# Patient Record
Sex: MALE | Race: OTHER | HISPANIC OR LATINO | URBAN - METROPOLITAN AREA
[De-identification: names, ages, dates, MRNs, and addresses within clinical notes are randomized per-mention and may not be internally consistent; named-entity substitution may affect disease eponyms.]

---

## 2017-07-26 ENCOUNTER — OUTPATIENT (OUTPATIENT)
Dept: OUTPATIENT SERVICES | Facility: HOSPITAL | Age: 72
LOS: 1 days | End: 2017-07-26
Payer: COMMERCIAL

## 2017-07-26 PROCEDURE — 73221 MRI JOINT UPR EXTREM W/O DYE: CPT

## 2017-07-26 PROCEDURE — 73221 MRI JOINT UPR EXTREM W/O DYE: CPT | Mod: 26,76,RT

## 2018-02-26 ENCOUNTER — FORM ENCOUNTER (OUTPATIENT)
Age: 73
End: 2018-02-26

## 2018-02-27 ENCOUNTER — APPOINTMENT (OUTPATIENT)
Dept: ORTHOPEDIC SURGERY | Facility: CLINIC | Age: 73
End: 2018-02-27
Payer: COMMERCIAL

## 2018-02-27 ENCOUNTER — OUTPATIENT (OUTPATIENT)
Dept: OUTPATIENT SERVICES | Facility: HOSPITAL | Age: 73
LOS: 1 days | End: 2018-02-27
Payer: COMMERCIAL

## 2018-02-27 DIAGNOSIS — D16.9 BENIGN NEOPLASM OF BONE AND ARTICULAR CARTILAGE, UNSPECIFIED: ICD-10-CM

## 2018-02-27 PROBLEM — Z00.00 ENCOUNTER FOR PREVENTIVE HEALTH EXAMINATION: Status: ACTIVE | Noted: 2018-02-27

## 2018-02-27 PROCEDURE — 73030 X-RAY EXAM OF SHOULDER: CPT

## 2018-02-27 PROCEDURE — 20610 DRAIN/INJ JOINT/BURSA W/O US: CPT | Mod: LT

## 2018-02-27 PROCEDURE — 99203 OFFICE O/P NEW LOW 30 MIN: CPT | Mod: 25

## 2018-02-27 PROCEDURE — 73030 X-RAY EXAM OF SHOULDER: CPT | Mod: 26,LT

## 2018-02-27 RX ORDER — IBUPROFEN 800 MG/1
800 TABLET, FILM COATED ORAL 3 TIMES DAILY
Qty: 63 | Refills: 1 | Status: ACTIVE | COMMUNITY
Start: 2018-02-27 | End: 1900-01-01

## 2018-03-09 ENCOUNTER — APPOINTMENT (OUTPATIENT)
Dept: ORTHOPEDIC SURGERY | Facility: CLINIC | Age: 73
End: 2018-03-09
Payer: COMMERCIAL

## 2018-03-09 DIAGNOSIS — M75.82 OTHER SHOULDER LESIONS, LEFT SHOULDER: ICD-10-CM

## 2018-03-09 PROCEDURE — 99213 OFFICE O/P EST LOW 20 MIN: CPT

## 2018-03-09 RX ORDER — IBUPROFEN 800 MG/1
800 TABLET, FILM COATED ORAL
Qty: 90 | Refills: 0 | Status: ACTIVE | COMMUNITY
Start: 2018-02-09

## 2018-03-09 RX ORDER — IBUPROFEN 600 MG/1
600 TABLET, FILM COATED ORAL
Qty: 30 | Refills: 0 | Status: ACTIVE | COMMUNITY
Start: 2017-08-27

## 2018-03-09 RX ORDER — CYCLOBENZAPRINE HYDROCHLORIDE 10 MG/1
10 TABLET, FILM COATED ORAL
Qty: 4 | Refills: 0 | Status: ACTIVE | COMMUNITY
Start: 2017-09-27

## 2018-03-09 RX ORDER — NAPROXEN 500 MG/1
500 TABLET, DELAYED RELEASE ORAL
Qty: 28 | Refills: 0 | Status: ACTIVE | COMMUNITY
Start: 2017-09-27

## 2018-03-09 RX ORDER — AMLODIPINE BESYLATE AND BENAZEPRIL HYDROCHLORIDE 10; 20 MG/1; MG/1
10-20 CAPSULE ORAL
Qty: 30 | Refills: 0 | Status: ACTIVE | COMMUNITY
Start: 2017-05-03

## 2018-03-09 RX ORDER — BICALUTAMIDE 50 MG/1
50 TABLET ORAL
Qty: 90 | Refills: 0 | Status: ACTIVE | COMMUNITY
Start: 2017-11-10

## 2018-03-09 RX ORDER — TRAMADOL HYDROCHLORIDE 50 MG/1
50 TABLET, COATED ORAL
Qty: 10 | Refills: 0 | Status: ACTIVE | COMMUNITY
Start: 2018-02-15

## 2018-03-09 RX ORDER — ACETAMINOPHEN AND CODEINE 300; 30 MG/1; MG/1
300-30 TABLET ORAL
Qty: 12 | Refills: 0 | Status: ACTIVE | COMMUNITY
Start: 2018-01-10

## 2018-03-09 RX ORDER — NAPROXEN SODIUM 375 MG/1
375 TABLET, FILM COATED, EXTENDED RELEASE ORAL
Qty: 20 | Refills: 0 | Status: ACTIVE | COMMUNITY
Start: 2017-12-10

## 2018-03-09 RX ORDER — GABAPENTIN 100 MG/1
100 CAPSULE ORAL
Qty: 90 | Refills: 0 | Status: ACTIVE | COMMUNITY
Start: 2017-11-12

## 2018-03-09 RX ORDER — LEUPROLIDE ACETATE 22.5 MG
22.5 KIT INTRAMUSCULAR
Qty: 1 | Refills: 0 | Status: ACTIVE | COMMUNITY
Start: 2016-12-16

## 2018-03-09 RX ORDER — TAMSULOSIN HYDROCHLORIDE 0.4 MG/1
0.4 CAPSULE ORAL
Qty: 30 | Refills: 0 | Status: ACTIVE | COMMUNITY
Start: 2017-01-09

## 2018-10-12 ENCOUNTER — APPOINTMENT (OUTPATIENT)
Dept: PULMONOLOGY | Facility: CLINIC | Age: 73
End: 2018-10-12

## 2018-11-02 ENCOUNTER — APPOINTMENT (OUTPATIENT)
Dept: PULMONOLOGY | Facility: CLINIC | Age: 73
End: 2018-11-02
Payer: COMMERCIAL

## 2018-11-02 VITALS
DIASTOLIC BLOOD PRESSURE: 96 MMHG | OXYGEN SATURATION: 97 % | BODY MASS INDEX: 23.07 KG/M2 | TEMPERATURE: 98.2 F | SYSTOLIC BLOOD PRESSURE: 162 MMHG | WEIGHT: 147 LBS | HEIGHT: 67 IN | HEART RATE: 73 BPM

## 2018-11-02 DIAGNOSIS — Z78.9 OTHER SPECIFIED HEALTH STATUS: ICD-10-CM

## 2018-11-02 DIAGNOSIS — R91.1 SOLITARY PULMONARY NODULE: ICD-10-CM

## 2018-11-02 DIAGNOSIS — I10 ESSENTIAL (PRIMARY) HYPERTENSION: ICD-10-CM

## 2018-11-02 PROCEDURE — 94010 BREATHING CAPACITY TEST: CPT

## 2018-11-02 PROCEDURE — 71046 X-RAY EXAM CHEST 2 VIEWS: CPT

## 2018-11-02 PROCEDURE — 99203 OFFICE O/P NEW LOW 30 MIN: CPT | Mod: 25

## 2018-11-23 ENCOUNTER — APPOINTMENT (OUTPATIENT)
Dept: NEUROLOGY | Facility: CLINIC | Age: 73
End: 2018-11-23
Payer: COMMERCIAL

## 2018-11-23 ENCOUNTER — APPOINTMENT (OUTPATIENT)
Dept: CT IMAGING | Facility: HOSPITAL | Age: 73
End: 2018-11-23

## 2018-11-23 VITALS
HEART RATE: 79 BPM | WEIGHT: 147 LBS | HEIGHT: 67 IN | BODY MASS INDEX: 23.07 KG/M2 | SYSTOLIC BLOOD PRESSURE: 143 MMHG | OXYGEN SATURATION: 97 % | DIASTOLIC BLOOD PRESSURE: 94 MMHG

## 2018-11-23 DIAGNOSIS — M54.12 RADICULOPATHY, CERVICAL REGION: ICD-10-CM

## 2018-11-23 DIAGNOSIS — M75.121 COMPLETE ROTATOR CUFF TEAR OR RUPTURE OF RIGHT SHOULDER, NOT SPECIFIED AS TRAUMATIC: ICD-10-CM

## 2018-11-23 DIAGNOSIS — M19.019 PRIMARY OSTEOARTHRITIS, UNSPECIFIED SHOULDER: ICD-10-CM

## 2018-11-23 PROCEDURE — 99204 OFFICE O/P NEW MOD 45 MIN: CPT

## 2019-01-30 ENCOUNTER — APPOINTMENT (OUTPATIENT)
Dept: NEUROLOGY | Facility: CLINIC | Age: 74
End: 2019-01-30

## 2019-05-17 ENCOUNTER — APPOINTMENT (OUTPATIENT)
Dept: NEUROLOGY | Facility: CLINIC | Age: 74
End: 2019-05-17

## 2019-10-04 ENCOUNTER — EMERGENCY (EMERGENCY)
Facility: HOSPITAL | Age: 74
LOS: 1 days | Discharge: HOME | End: 2019-10-04
Attending: EMERGENCY MEDICINE | Admitting: EMERGENCY MEDICINE
Payer: COMMERCIAL

## 2019-10-04 VITALS
OXYGEN SATURATION: 100 % | RESPIRATION RATE: 17 BRPM | TEMPERATURE: 98 F | DIASTOLIC BLOOD PRESSURE: 100 MMHG | SYSTOLIC BLOOD PRESSURE: 165 MMHG | HEART RATE: 55 BPM

## 2019-10-04 VITALS
TEMPERATURE: 99 F | DIASTOLIC BLOOD PRESSURE: 97 MMHG | OXYGEN SATURATION: 98 % | HEART RATE: 50 BPM | RESPIRATION RATE: 18 BRPM | SYSTOLIC BLOOD PRESSURE: 155 MMHG

## 2019-10-04 DIAGNOSIS — Z96.659 PRESENCE OF UNSPECIFIED ARTIFICIAL KNEE JOINT: Chronic | ICD-10-CM

## 2019-10-04 LAB
ALBUMIN SERPL ELPH-MCNC: 4.4 G/DL — SIGNIFICANT CHANGE UP (ref 3.3–5)
ALP SERPL-CCNC: 105 U/L — SIGNIFICANT CHANGE UP (ref 40–120)
ALT FLD-CCNC: 12 U/L — SIGNIFICANT CHANGE UP (ref 10–45)
ANION GAP SERPL CALC-SCNC: 9 MMOL/L — SIGNIFICANT CHANGE UP (ref 5–17)
APPEARANCE UR: CLEAR — SIGNIFICANT CHANGE UP
APTT BLD: 32.2 SEC — SIGNIFICANT CHANGE UP (ref 27.5–36.3)
AST SERPL-CCNC: 14 U/L — SIGNIFICANT CHANGE UP (ref 10–40)
BASOPHILS # BLD AUTO: 0.02 K/UL — SIGNIFICANT CHANGE UP (ref 0–0.2)
BASOPHILS NFR BLD AUTO: 0.6 % — SIGNIFICANT CHANGE UP (ref 0–2)
BILIRUB SERPL-MCNC: 0.3 MG/DL — SIGNIFICANT CHANGE UP (ref 0.2–1.2)
BILIRUB UR-MCNC: NEGATIVE — SIGNIFICANT CHANGE UP
BUN SERPL-MCNC: 8 MG/DL — SIGNIFICANT CHANGE UP (ref 7–23)
CALCIUM SERPL-MCNC: 9.6 MG/DL — SIGNIFICANT CHANGE UP (ref 8.4–10.5)
CHLORIDE SERPL-SCNC: 104 MMOL/L — SIGNIFICANT CHANGE UP (ref 96–108)
CO2 SERPL-SCNC: 27 MMOL/L — SIGNIFICANT CHANGE UP (ref 22–31)
COLOR SPEC: YELLOW — SIGNIFICANT CHANGE UP
CREAT SERPL-MCNC: 0.94 MG/DL — SIGNIFICANT CHANGE UP (ref 0.5–1.3)
DIFF PNL FLD: NEGATIVE — SIGNIFICANT CHANGE UP
EOSINOPHIL # BLD AUTO: 0.2 K/UL — SIGNIFICANT CHANGE UP (ref 0–0.5)
EOSINOPHIL NFR BLD AUTO: 5.7 % — SIGNIFICANT CHANGE UP (ref 0–6)
GLUCOSE SERPL-MCNC: 87 MG/DL — SIGNIFICANT CHANGE UP (ref 70–99)
GLUCOSE UR QL: NEGATIVE — SIGNIFICANT CHANGE UP
HCT VFR BLD CALC: 47.2 % — SIGNIFICANT CHANGE UP (ref 39–50)
HGB BLD-MCNC: 15 G/DL — SIGNIFICANT CHANGE UP (ref 13–17)
IMM GRANULOCYTES NFR BLD AUTO: 0.3 % — SIGNIFICANT CHANGE UP (ref 0–1.5)
INR BLD: 1.04 — SIGNIFICANT CHANGE UP (ref 0.88–1.16)
KETONES UR-MCNC: NEGATIVE — SIGNIFICANT CHANGE UP
LEUKOCYTE ESTERASE UR-ACNC: NEGATIVE — SIGNIFICANT CHANGE UP
LIDOCAIN IGE QN: 13 U/L — SIGNIFICANT CHANGE UP (ref 7–60)
LYMPHOCYTES # BLD AUTO: 1.29 K/UL — SIGNIFICANT CHANGE UP (ref 1–3.3)
LYMPHOCYTES # BLD AUTO: 36.8 % — SIGNIFICANT CHANGE UP (ref 13–44)
MCHC RBC-ENTMCNC: 31.8 GM/DL — LOW (ref 32–36)
MCHC RBC-ENTMCNC: 32 PG — SIGNIFICANT CHANGE UP (ref 27–34)
MCV RBC AUTO: 100.6 FL — HIGH (ref 80–100)
MONOCYTES # BLD AUTO: 0.32 K/UL — SIGNIFICANT CHANGE UP (ref 0–0.9)
MONOCYTES NFR BLD AUTO: 9.1 % — SIGNIFICANT CHANGE UP (ref 2–14)
NEUTROPHILS # BLD AUTO: 1.67 K/UL — LOW (ref 1.8–7.4)
NEUTROPHILS NFR BLD AUTO: 47.5 % — SIGNIFICANT CHANGE UP (ref 43–77)
NITRITE UR-MCNC: NEGATIVE — SIGNIFICANT CHANGE UP
NRBC # BLD: 0 /100 WBCS — SIGNIFICANT CHANGE UP (ref 0–0)
PH UR: 7.5 — SIGNIFICANT CHANGE UP (ref 5–8)
PLATELET # BLD AUTO: 272 K/UL — SIGNIFICANT CHANGE UP (ref 150–400)
POTASSIUM SERPL-MCNC: 4.5 MMOL/L — SIGNIFICANT CHANGE UP (ref 3.5–5.3)
POTASSIUM SERPL-SCNC: 4.5 MMOL/L — SIGNIFICANT CHANGE UP (ref 3.5–5.3)
PROT SERPL-MCNC: 7 G/DL — SIGNIFICANT CHANGE UP (ref 6–8.3)
PROT UR-MCNC: NEGATIVE MG/DL — SIGNIFICANT CHANGE UP
PROTHROM AB SERPL-ACNC: 11.8 SEC — SIGNIFICANT CHANGE UP (ref 10–12.9)
RBC # BLD: 4.69 M/UL — SIGNIFICANT CHANGE UP (ref 4.2–5.8)
RBC # FLD: 13.7 % — SIGNIFICANT CHANGE UP (ref 10.3–14.5)
SODIUM SERPL-SCNC: 140 MMOL/L — SIGNIFICANT CHANGE UP (ref 135–145)
SP GR SPEC: 1.01 — SIGNIFICANT CHANGE UP (ref 1–1.03)
TROPONIN T SERPL-MCNC: 0.02 NG/ML — HIGH (ref 0–0.01)
TROPONIN T SERPL-MCNC: <0.01 NG/ML — SIGNIFICANT CHANGE UP (ref 0–0.01)
UROBILINOGEN FLD QL: 0.2 E.U./DL — SIGNIFICANT CHANGE UP
WBC # BLD: 3.51 K/UL — LOW (ref 3.8–10.5)
WBC # FLD AUTO: 3.51 K/UL — LOW (ref 3.8–10.5)

## 2019-10-04 PROCEDURE — 74177 CT ABD & PELVIS W/CONTRAST: CPT

## 2019-10-04 PROCEDURE — 80053 COMPREHEN METABOLIC PANEL: CPT

## 2019-10-04 PROCEDURE — 96374 THER/PROPH/DIAG INJ IV PUSH: CPT | Mod: XU

## 2019-10-04 PROCEDURE — 85025 COMPLETE CBC W/AUTO DIFF WBC: CPT

## 2019-10-04 PROCEDURE — 99285 EMERGENCY DEPT VISIT HI MDM: CPT

## 2019-10-04 PROCEDURE — 74177 CT ABD & PELVIS W/CONTRAST: CPT | Mod: 26

## 2019-10-04 PROCEDURE — 99284 EMERGENCY DEPT VISIT MOD MDM: CPT | Mod: 25

## 2019-10-04 PROCEDURE — 81003 URINALYSIS AUTO W/O SCOPE: CPT

## 2019-10-04 PROCEDURE — 70450 CT HEAD/BRAIN W/O DYE: CPT | Mod: 26

## 2019-10-04 PROCEDURE — 85610 PROTHROMBIN TIME: CPT

## 2019-10-04 PROCEDURE — 36415 COLL VENOUS BLD VENIPUNCTURE: CPT

## 2019-10-04 PROCEDURE — 71046 X-RAY EXAM CHEST 2 VIEWS: CPT

## 2019-10-04 PROCEDURE — 83690 ASSAY OF LIPASE: CPT

## 2019-10-04 PROCEDURE — 84484 ASSAY OF TROPONIN QUANT: CPT

## 2019-10-04 PROCEDURE — 71046 X-RAY EXAM CHEST 2 VIEWS: CPT | Mod: 26

## 2019-10-04 PROCEDURE — 70450 CT HEAD/BRAIN W/O DYE: CPT

## 2019-10-04 PROCEDURE — 85730 THROMBOPLASTIN TIME PARTIAL: CPT

## 2019-10-04 RX ORDER — IOHEXOL 300 MG/ML
30 INJECTION, SOLUTION INTRAVENOUS ONCE
Refills: 0 | Status: COMPLETED | OUTPATIENT
Start: 2019-10-04 | End: 2019-10-04

## 2019-10-04 RX ORDER — MORPHINE SULFATE 50 MG/1
4 CAPSULE, EXTENDED RELEASE ORAL ONCE
Refills: 0 | Status: DISCONTINUED | OUTPATIENT
Start: 2019-10-04 | End: 2019-10-04

## 2019-10-04 RX ADMIN — IOHEXOL 30 MILLILITER(S): 300 INJECTION, SOLUTION INTRAVENOUS at 10:54

## 2019-10-04 RX ADMIN — MORPHINE SULFATE 4 MILLIGRAM(S): 50 CAPSULE, EXTENDED RELEASE ORAL at 10:54

## 2019-10-04 NOTE — ED PROVIDER NOTE - ATTENDING CONTRIBUTION TO CARE
Pt is a 73yo m, h/o prostate ca, who p/w abd pain x 1 mo and cp x 1 wk. Abd pain to b/l upper abd/ flank. No relief w/ oxycodone. No f/c, n/v/d/c, urinary sx's. Also with intermittent dull pain to chest w/ sob when ambulating. No leg pain/ swelling.     VITAL SIGNS: I have reviewed nursing notes and confirm.  CONSTITUTIONAL: Well-developed; well-nourished; in no acute distress.   SKIN:  warm and dry, no acute rash.   HEAD:  normocephalic, atraumatic.  EYES: EOM intact; conjunctiva and sclera clear.  ENT: No nasal discharge; airway clear.   NECK: Supple; non tender.  CARD: S1, S2 normal; no murmurs, gallops, or rubs. Regular rate and rhythm.   RESP:  Clear to auscultation b/l, no wheezes, rales or rhonchi.  ABD: Normal bowel sounds; soft; non-distended; non-tender; no guarding/ rebound.  EXT: Normal ROM. No clubbing, cyanosis or edema. 2+ pulses to b/l ue/le.  NEURO: Alert, oriented, grossly unremarkable  PSYCH: Cooperative, mood and affect appropriate.    EKG non-ischemic, + sinus nitza. Labs reviewed and unremarkable with normal wbc. Trop minimally elev at 0.02. Will obtain ct a/p and continue to monitor. Pt is a 75yo m, h/o prostate ca, who p/w abd pain x 1 mo and cp x 1 wk. Abd pain to b/l upper abd/ flank. No relief w/ oxycodone. No f/c, n/v/d/c, urinary sx's. Also with intermittent dull pain to chest w/ sob when ambulating. No leg pain/ swelling.     VITAL SIGNS: I have reviewed nursing notes and confirm.  CONSTITUTIONAL: Well-developed; well-nourished; in no acute distress.   SKIN:  warm and dry, no acute rash.   HEAD:  normocephalic, atraumatic.  EYES: EOM intact; conjunctiva and sclera clear.  ENT: No nasal discharge; airway clear.   NECK: Supple.  CARD: S1, S2 normal; no murmurs, gallops, or rubs. Regular rate and rhythm.   RESP:  Clear to auscultation b/l, no wheezes, rales or rhonchi.  ABD: Normal bowel sounds; soft; non-distended; non-tender; no guarding/ rebound.  EXT: Normal ROM. No clubbing, cyanosis or edema. 2+ pulses to b/l ue/le.  NEURO: Alert, oriented, grossly unremarkable  PSYCH: Cooperative, mood and affect appropriate.    EKG non-ischemic, + sinus nitza. Labs reviewed and unremarkable with normal wbc. Trop minimally elev at 0.02. Will obtain ct a/p and continue to monitor.   CT a/p neg for acute findings. Repeat trop improved. Pt seen by cards fellow for cp/orr and cleared for dc and outpt f/u with Dr. Esteves for outpt stress test/ echo. ED evaluation and management discussed with the patient and family in detail.  Close PMD/ cards follow up encouraged.  Strict ED return instructions discussed in detail and patient given the opportunity to ask any questions about their discharge diagnosis and instructions. Patient verbalized understanding.

## 2019-10-04 NOTE — ED PROVIDER NOTE - CLINICAL SUMMARY MEDICAL DECISION MAKING FREE TEXT BOX
cp and abd pain. pt well appearing. tender to b/l upper abdomen. pt without cp at this time. ecg no ischemic changes. cxr no acute pathology. ct abd no acute pathology. pt evaluated by heme onc fellow and pt reported dizziness to her and recommended ct head. no acute pathology on ct head. given elevated troponin initially case d/w cardiac fellow. cardiac fellow arranged for pt to be seen on 10/7/19 by Dr Wheeler. will d/c home to f/u with Dr Esteves and Dr Nix. return precautions d/w pt.

## 2019-10-04 NOTE — ED ADULT NURSE NOTE - CHPI ED NUR SYMPTOMS NEG
no diarrhea/no chills/no dysuria/no hematuria/no blood in stool/no abdominal distension/no burning urination/no vomiting/no fever

## 2019-10-04 NOTE — ED PROVIDER NOTE - NSFOLLOWUPINSTRUCTIONS_ED_ALL_ED_FT
Follow up with cardiology on 10/7/19. Follow up with Dr Nix this week.,         Chest Pain    WHAT YOU NEED TO KNOW:    Chest pain can be caused by a range of conditions, from not serious to life-threatening. Chest pain can be a symptom of a digestive problem, such as acid reflux or a stomach ulcer. An anxiety attack or a strong emotion, such as anger, can also cause chest pain. Infection, inflammation, or a fracture in the bones or cartilage in your chest can cause pain or discomfort. Sometimes chest pain or pressure is caused by poor blood flow to your heart (angina). Chest pain may also be caused by life-threatening conditions such as a heart attack or blood clot in your lungs.     DISCHARGE INSTRUCTIONS:    Call 911 if:     You have any of the following signs of a heart attack:   Squeezing, pressure, or pain in your chest      You may also have any of the following:   Discomfort or pain in your back, neck, jaw, stomach, or arm      Shortness of breath      Nausea or vomiting      Lightheadedness or a sudden cold sweat        Return to the emergency department if:     You have chest discomfort that gets worse, even with medicine.      You cough or vomit blood.       Your bowel movements are black or bloody.       You cannot stop vomiting, or it hurts to swallow.     Contact your healthcare provider if:     You have questions or concerns about your condition or care.        Medicines:     Medicines may be given to treat the cause of your chest pain. Examples include pain medicine, anxiety medicine, or medicines to increase blood flow to your heart.       Do not take certain medicines without asking your healthcare provider first. These include NSAIDs, herbal or vitamin supplements, or hormones (estrogen or progestin).       Take your medicine as directed. Contact your healthcare provider if you think your medicine is not helping or if you have side effects. Tell him or her if you are allergic to any medicine. Keep a list of the medicines, vitamins, and herbs you take. Include the amounts, and when and why you take them. Bring the list or the pill bottles to follow-up visits. Carry your medicine list with you in case of an emergency.    Follow up with your healthcare provider within 72 hours, or as directed: You may need to return for more tests to find the cause of your chest pain. You may be referred to a specialist, such as a cardiologist or gastroenterologist. Write down your questions so you remember to ask them during your visits.     Healthy living tips: The following are general healthy guidelines. If your chest pain is caused by a heart problem, your healthcare provider will give you specific guidelines to follow.    Do not smoke. Nicotine and other chemicals in cigarettes and cigars can cause lung and heart damage. Ask your healthcare provider for information if you currently smoke and need help to quit. E-cigarettes or smokeless tobacco still contain nicotine. Talk to your healthcare provider before you use these products.       Eat a variety of healthy, low-fat, low-salt foods. Healthy foods include fruits, vegetables, whole-grain breads, low-fat dairy products, beans, lean meats, and fish. Ask for more information about a heart healthy diet.      Drink plenty of water every day. Your body is made of mostly water. Water helps your body to control your temperature and blood pressure. Ask your healthcare provider how much water you should drink every day.      Ask about activity. Your healthcare provider will tell you which activities to limit or avoid. Ask when you can drive, return to work, and have sex. Ask about the best exercise plan for you.      Maintain a healthy weight. Ask your healthcare provider how much you should weigh. Ask him or her to help you create a weight loss plan if you are overweight.       Get the flu and pneumonia vaccines. All adults should get the influenza (flu) vaccine. Get it every year as soon as it becomes available. The pneumococcal vaccine is given to adults aged 65 years or older. The vaccine is given every 5 years to prevent pneumococcal disease, such as pneumonia.    If you have a stent:     Carry your stent card with you at all times.       Let all healthcare providers know that you have a stent.          © Copyright [employer] 2019 All illustrations and images included in CareNotes are the copyrighted property of [employer] or [employer].      back to top                      © [employer] 2019         Acute Abdominal Pain    WHAT YOU NEED TO KNOW:    The cause of your abdominal pain may not be found. If a cause is found, treatment will depend on what the cause is.     DISCHARGE INSTRUCTIONS:    Return to the emergency department if:     You vomit blood or cannot stop vomiting.      You have blood in your bowel movement or it looks like tar.       You have bleeding from your rectum.       Your abdomen is larger than usual, more painful, and hard.       You have severe pain in your abdomen.       You stop passing gas and having bowel movements.       You feel weak, dizzy, or faint.    Contact your healthcare provider if:     You have a fever.      You have new signs and symptoms.      Your symptoms do not get better with treatment.       You have questions or concerns about your condition or care.    Medicines may be given to decrease pain, treat an infection, and manage your symptoms. Take your medicine as directed. Call your healthcare provider if you think your medicine is not helping or if you have side effects. Tell him if you are allergic to any medicine. Keep a list of the medicines, vitamins, and herbs you take. Include the amounts, and when and why you take them. Bring the list or the pill bottles to follow-up visits. Carry your medicine list with you in case of an emergency.    Manage your symptoms:     Apply heat on your abdomen for 20 to 30 minutes every 2 hours for as many days as directed. Heat helps decrease pain and muscle spasms.       Manage your stress. Stress may cause abdominal pain. Your healthcare provider may recommend relaxation techniques and deep breathing exercises to help decrease your stress. Your healthcare provider may recommend you talk to someone about your stress or anxiety, such as a counselor or a trusted friend. Get plenty of sleep and exercise regularly.       Limit or do not drink alcohol. Alcohol can make your abdominal pain worse. Ask your healthcare provider if it is safe for you to drink alcohol. Also ask how much is safe for you to drink.       Do not smoke. Nicotine and other chemicals in cigarettes can damage your esophagus and stomach. Ask your healthcare provider for information if you currently smoke and need help to quit. E-cigarettes or smokeless tobacco still contain nicotine. Talk to your healthcare provider before you use these products.     Make changes to the food you eat as directed: Do not eat foods that cause abdominal pain or other symptoms. Eat small meals more often.     Eat more high-fiber foods if you are constipated. High-fiber foods include fruits, vegetables, whole-grain foods, and legumes.       Do not eat foods that cause gas if you have bloating. Examples include broccoli, cabbage, and cauliflower. Do not drink soda or carbonated drinks, because these may also cause gas.       Do not eat foods or drinks that contain sorbitol or fructose if you have diarrhea and bloating. Some examples are fruit juices, candy, jelly, and sugar-free gum.       Do not eat high-fat foods, such as fried foods, cheeseburgers, hot dogs, and desserts.      Limit or do not drink caffeine. Caffeine may make symptoms, such as heart burn or nausea, worse.       Drink plenty of liquids to prevent dehydration from diarrhea or vomiting. Ask your healthcare provider how much liquid to drink each day and which liquids are best for you.     Follow up with your healthcare provider as directed: Write down your questions so you remember to ask them during your visits.       © Copyright [employer] 2019 All illustrations and images included in CareNotes are the copyrighted property of [employer] or [employer].      back to top                      © Copyright [employer] 2019

## 2019-10-04 NOTE — ED ADULT NURSE NOTE - CAS TRG GENERAL NORM CIRC DET
Strong peripheral pulses details… detailed exam no joint warmth/no joint swelling/ROM intact/no calf tenderness/no joint erythema/normal strength

## 2019-10-04 NOTE — ED PROVIDER NOTE - PATIENT PORTAL LINK FT
You can access the FollowMyHealth Patient Portal offered by [facility] by registering at the following website: [email or web address]. By joining [employer]’s FollowMyHealth portal, you will also be able to view your health information using other applications (apps) compatible with our system.

## 2019-10-04 NOTE — ED PROVIDER NOTE - OBJECTIVE STATEMENT
75 y/o male with hx of prostate ca, GERD c/o abd pain x 1month and cp x 1 wk. pt states pain to b/l upper abdomen. pt notes seen by pmd and given oxycodone without relief. no fever or chills. no n/v/d. no melena or bloody stools. no urinary sx's. no testicular pain. pt notes intermittent cp for past 1 wk. dull achy pain to center of chest and HERRERA. no leg pain or swelling. no further complaints. pt notes appt with pmd today and sent to ED.

## 2019-10-04 NOTE — CONSULT NOTE ADULT - SUBJECTIVE AND OBJECTIVE BOX
73 yo M with prostate cancer on Lupron/Casodex   PMHx:   PSHx:  SHx:  FHx:    Allergies    No Known Allergies    Intolerances      REVIEW OF SYSTEMS:    CONSTITUTIONAL: No fever, weight loss, or fatigue  EYES: No eye pain, visual disturbances, or discharge  ENMT:  No difficulty hearing, tinnitus, vertigo; No sinus or throat pain  NECK: No pain or stiffness  BREASTS: No pain, masses, or nipple discharge  RESPIRATORY: No cough, wheezing, chills or hemoptysis; No shortness of breath  CARDIOVASCULAR: No chest pain, palpitations, dizziness, or leg swelling  GASTROINTESTINAL: No abdominal or epigastric pain. No nausea, vomiting, or hematemesis; No diarrhea or constipation. No melena or hematochezia.  GENITOURINARY: No dysuria, frequency, hematuria, or incontinence  NEUROLOGICAL: No headaches, memory loss, loss of strength, numbness, or tremors  SKIN: No itching, burning, rashes, or lesions   LYMPH NODES: No enlarged glands  ENDOCRINE: No heat or cold intolerance; No hair loss  MUSCULOSKELETAL: No joint pain or swelling; No muscle, back, or extremity pain  PSYCHIATRIC: No depression, anxiety, mood swings, or difficulty sleeping  HEME/LYMPH: No easy bruising, or bleeding gums  ALLERY AND IMMUNOLOGIC: No hives or eczema      MEDICATIONS  (STANDING):    MEDICATIONS  (PRN):    Vital Signs Last 24 Hrs  T(C): 36.9 (10-04-19 @ 10:04), Max: 36.9 (10-04-19 @ 10:04)  T(F): 98.5 (10-04-19 @ 10:04), Max: 98.5 (10-04-19 @ 10:04)  HR: 57 (10-04-19 @ 10:38) (55 - 57)  BP: 161/94 (10-04-19 @ 10:38) (161/94 - 165/100)  BP(mean): --  RR: 18 (10-04-19 @ 10:38) (17 - 18)  SpO2: 100% (10-04-19 @ 10:38) (100% - 100%)    PHYSICAL EXAM:  Constitutional: NAD, well-groomed, well-developed  HEENT: PERRLA, EOMI, Normal Hearing, MMM  Neck: No LAD, No JVD  Back: Normal spine flexure, No CVA tenderness  Respiratory: CTAB  Cardiovascular: S1 and S2, RRR, no M/G/R  Gastrointestinal: BS+, soft, NT/ND  Extremities: No peripheral edema  Vascular: 2+ peripheral pulses  Neurological: A/O x 3, no focal deficits  Psychiatric: Normal mood, normal affect  Musculoskeletal: 5/5 strength b/l upper and lower extremities  Skin: No rashes        CBC Full  -  ( 04 Oct 2019 11:00 )  WBC Count : 3.51 K/uL  RBC Count : 4.69 M/uL  Hemoglobin : 15.0 g/dL  Hematocrit : 47.2 %  Platelet Count - Automated : 272 K/uL  Mean Cell Volume : 100.6 fl  Mean Cell Hemoglobin : 32.0 pg  Mean Cell Hemoglobin Concentration : 31.8 gm/dL  Auto Neutrophil # : 1.67 K/uL  Auto Lymphocyte # : 1.29 K/uL  Auto Monocyte # : 0.32 K/uL  Auto Eosinophil # : 0.20 K/uL  Auto Basophil # : 0.02 K/uL  Auto Neutrophil % : 47.5 %  Auto Lymphocyte % : 36.8 %  Auto Monocyte % : 9.1 %  Auto Eosinophil % : 5.7 %  Auto Basophil % : 0.6 %      INR: 1.04 (10-04-19 @ 11:00)        10-04    140  |  104  |  8   ----------------------------<  87  4.5   |  27  |  0.94    Ca    9.6      04 Oct 2019 11:00    TPro  7.0  /  Alb  4.4  /  TBili  0.3  /  DBili  x   /  AST  14  /  ALT  12  /  AlkPhos  105  10-04 ONCOLOGY CONSULT NOTE ()  73 yo M with prostate cancer on Lupron/Casodex well know to ACP, sees , now presents with couple of days of dizziness, weakness and abdominal pain. He currently denies any of these symptoms as he is lying down, but whenever he gets up he feels dizzy. His most recent PSA in office was 6 - stable from priors.    Allergies    No Known Allergies    Intolerances      REVIEW OF SYSTEMS:    CONSTITUTIONAL: No fever, weight loss, or fatigue  EYES: No eye pain, visual disturbances, or discharge  ENMT:  No difficulty hearing, tinnitus, vertigo; No sinus or throat pain  NECK: No pain or stiffness  BREASTS: No pain, masses, or nipple discharge  RESPIRATORY: No cough, wheezing, chills or hemoptysis; No shortness of breath  CARDIOVASCULAR: No chest pain, palpitations, dizziness, or leg swelling  GASTROINTESTINAL: No abdominal or epigastric pain. No nausea, vomiting, or hematemesis; No diarrhea or constipation. No melena or hematochezia.  GENITOURINARY: No dysuria, frequency, hematuria, or incontinence  NEUROLOGICAL: No headaches, memory loss, loss of strength, numbness, or tremors  SKIN: No itching, burning, rashes, or lesions   LYMPH NODES: No enlarged glands  ENDOCRINE: No heat or cold intolerance; No hair loss  MUSCULOSKELETAL: No joint pain or swelling; No muscle, back, or extremity pain  PSYCHIATRIC: No depression, anxiety, mood swings, or difficulty sleeping  HEME/LYMPH: No easy bruising, or bleeding gums  ALLERY AND IMMUNOLOGIC: No hives or eczema      MEDICATIONS  (STANDING):    MEDICATIONS  (PRN):    Vital Signs Last 24 Hrs  T(C): 36.9 (10-04-19 @ 10:04), Max: 36.9 (10-04-19 @ 10:04)  T(F): 98.5 (10-04-19 @ 10:04), Max: 98.5 (10-04-19 @ 10:04)  HR: 57 (10-04-19 @ 10:38) (55 - 57)  BP: 161/94 (10-04-19 @ 10:38) (161/94 - 165/100)  BP(mean): --  RR: 18 (10-04-19 @ 10:38) (17 - 18)  SpO2: 100% (10-04-19 @ 10:38) (100% - 100%)    PHYSICAL EXAM:  Constitutional: NAD, well-groomed, well-developed  HEENT: PERRLA, EOMI, Normal Hearing, MMM  Neck: No LAD, No JVD  Back: Normal spine flexure, No CVA tenderness  Respiratory: CTAB  Cardiovascular: S1 and S2, RRR, no M/G/R  Gastrointestinal: BS+, soft, NT/ND          CBC Full  -  ( 04 Oct 2019 11:00 )  WBC Count : 3.51 K/uL  RBC Count : 4.69 M/uL  Hemoglobin : 15.0 g/dL  Hematocrit : 47.2 %  Platelet Count - Automated : 272 K/uL  Mean Cell Volume : 100.6 fl  Mean Cell Hemoglobin : 32.0 pg  Mean Cell Hemoglobin Concentration : 31.8 gm/dL  Auto Neutrophil # : 1.67 K/uL  Auto Lymphocyte # : 1.29 K/uL  Auto Monocyte # : 0.32 K/uL  Auto Eosinophil # : 0.20 K/uL  Auto Basophil # : 0.02 K/uL  Auto Neutrophil % : 47.5 %  Auto Lymphocyte % : 36.8 %  Auto Monocyte % : 9.1 %  Auto Eosinophil % : 5.7 %  Auto Basophil % : 0.6 %      INR: 1.04 (10-04-19 @ 11:00)        10-04    140  |  104  |  8   ----------------------------<  87  4.5   |  27  |  0.94    Ca    9.6      04 Oct 2019 11:00    TPro  7.0  /  Alb  4.4  /  TBili  0.3  /  DBili  x   /  AST  14  /  ALT  12  /  AlkPhos  105  10-04    < from: CT Abdomen and Pelvis w/ Oral Cont and w/ IV Cont (10.04.19 @ 12:50) >  FINDINGS: CT of the abdomen and pelvis was performed with the   administration of intravenous contrast. Reconstructions were performed in   the sagittal and coronal planes. No prior studies are available for   comparison.    Evaluation of the lower chest demonstrates top normal heart size. There   is no pleural or pericardial effusion. Lower lung parenchyma is   unremarkable. Evaluation of the abdomen demonstrates heterogeneous   perfusion of the liver with enlargement of the right lobe measuring 17.7   cm and a transient region of hypervascularity within the periphery of the   medial segment of the left lobe. Gallbladder, spleen, pancreas, bilateral   adrenal glands and bilateral kidneys are unremarkable aside from pancreas   divisum, bilateral adrenal adenomatous hyperplasia and a small right   renal cyst. Evaluation of the gastrointestinal tract is negative for   bowel thickening or bowel obstruction. There is normal appearance of the   appendix. Evaluation of the pelvis demonstrates trabeculation of the   bladder wall, consistent with chronic bladder outlet obstruction.   Prostatectomy. No free fluid. No adenopathy. Moderate to severe aortic   vascular calcification. Opacified aspects of the hepatic, portal and   bilateral renal veins demonstrates no yanet intraluminal thrombus.   Moderate diffuse anasarca. Evaluation of the osseous structures   demonstrates no destructive lesion.          IMPRESSION:    No acute abdominal/pelvic pathology.    < end of copied text >

## 2019-10-04 NOTE — CONSULT NOTE ADULT - ASSESSMENT
75 yo M with prostate cancer on Lupron/Casodex well know to ACP, sees , now presents with couple of days of dizziness, weakness and abdominal pain.     Prostate cancer  S/p XRT 15 years ago, now with biochemical recurrence but remains castrate-sensitive on combined androgen blockade with Lupron/Casodex.  Dizziness  Abdominal pain      -recommend CT chest and MRI brain to rule out disease spread given dizziness  -please obtain PSA    If admitted H/O will follow.    If discharge, the patient knows to see  next Wednesday.

## 2019-10-04 NOTE — ED ADULT NURSE NOTE - NSIMPLEMENTINTERV_GEN_ALL_ED
Implemented All Universal Safety Interventions:  [place] to call system. Call bell, personal items and telephone within reach. Instruct patient to call for assistance. Room bathroom lighting operational. Non-slip footwear when patient is off stretcher. Physically safe environment: no spills, clutter or unnecessary equipment. Stretcher in lowest position, wheels locked, appropriate side rails in place.

## 2019-10-04 NOTE — ED ADULT NURSE NOTE - PMH
Adenoma of colon    Arthritis    DDD (degenerative disc disease), cervical    Diverticulosis    Dyspepsia    GERD (gastroesophageal reflux disease)    Neoplasm of lung    Prostate cancer    PVD (peripheral vascular disease)    Tendonitis    Vitamin B 12 deficiency

## 2019-10-04 NOTE — ED ADULT NURSE NOTE - OBJECTIVE STATEMENT
Patient alert and oriented x 3 history of prostate cancer c/o abdominal pain radiating to lower back  for 1 month , nausea , constipation for 1 week , intermitent cp with sob when walking for 1.5 weeks . ON lupron injection every 3 months Went for ff up to PMD today was sent here for evaluation . Denies any fever , chills nor vomiting . Seen and examined by JOSE RAMON Boothe , labs sent ,  will continue to monitor .

## 2019-10-04 NOTE — ED ADULT TRIAGE NOTE - OTHER COMPLAINTS
Pt c.o chest and abd discomfort x weeks. constipation x 1 week. also reports intermittent sob, on home o2. hx lung/prostate ca. given asa prior to arrival.

## 2019-10-10 PROBLEM — E53.8 DEFICIENCY OF OTHER SPECIFIED B GROUP VITAMINS: Chronic | Status: ACTIVE | Noted: 2019-10-04

## 2019-10-10 PROBLEM — R10.13 EPIGASTRIC PAIN: Chronic | Status: ACTIVE | Noted: 2019-10-04

## 2019-10-10 PROBLEM — D12.6 BENIGN NEOPLASM OF COLON, UNSPECIFIED: Chronic | Status: ACTIVE | Noted: 2019-10-04

## 2019-10-10 PROBLEM — K21.9 GASTRO-ESOPHAGEAL REFLUX DISEASE WITHOUT ESOPHAGITIS: Chronic | Status: ACTIVE | Noted: 2019-10-04

## 2019-10-10 PROBLEM — M50.30 OTHER CERVICAL DISC DEGENERATION, UNSPECIFIED CERVICAL REGION: Chronic | Status: ACTIVE | Noted: 2019-10-04

## 2019-10-10 PROBLEM — C61 MALIGNANT NEOPLASM OF PROSTATE: Chronic | Status: ACTIVE | Noted: 2019-10-04

## 2019-10-10 PROBLEM — M77.9 ENTHESOPATHY, UNSPECIFIED: Chronic | Status: ACTIVE | Noted: 2019-10-04

## 2019-10-10 PROBLEM — I73.9 PERIPHERAL VASCULAR DISEASE, UNSPECIFIED: Chronic | Status: ACTIVE | Noted: 2019-10-04

## 2019-10-10 PROBLEM — K57.90 DIVERTICULOSIS OF INTESTINE, PART UNSPECIFIED, WITHOUT PERFORATION OR ABSCESS WITHOUT BLEEDING: Chronic | Status: ACTIVE | Noted: 2019-10-04

## 2019-10-10 PROBLEM — M19.90 UNSPECIFIED OSTEOARTHRITIS, UNSPECIFIED SITE: Chronic | Status: ACTIVE | Noted: 2019-10-04

## 2019-10-14 DIAGNOSIS — R10.12 LEFT UPPER QUADRANT PAIN: ICD-10-CM

## 2019-10-14 DIAGNOSIS — R06.09 OTHER FORMS OF DYSPNEA: ICD-10-CM

## 2019-10-14 DIAGNOSIS — R07.89 OTHER CHEST PAIN: ICD-10-CM

## 2019-10-14 DIAGNOSIS — R10.11 RIGHT UPPER QUADRANT PAIN: ICD-10-CM

## 2019-10-25 ENCOUNTER — APPOINTMENT (OUTPATIENT)
Dept: HEART AND VASCULAR | Facility: CLINIC | Age: 74
End: 2019-10-25

## 2019-12-06 ENCOUNTER — APPOINTMENT (OUTPATIENT)
Dept: HEART AND VASCULAR | Facility: CLINIC | Age: 74
End: 2019-12-06
Payer: COMMERCIAL

## 2019-12-06 VITALS
HEART RATE: 83 BPM | OXYGEN SATURATION: 97 % | HEIGHT: 67 IN | BODY MASS INDEX: 19.46 KG/M2 | DIASTOLIC BLOOD PRESSURE: 90 MMHG | WEIGHT: 124 LBS | SYSTOLIC BLOOD PRESSURE: 140 MMHG

## 2019-12-06 PROCEDURE — 93000 ELECTROCARDIOGRAM COMPLETE: CPT

## 2019-12-06 PROCEDURE — 99204 OFFICE O/P NEW MOD 45 MIN: CPT

## 2019-12-06 NOTE — REVIEW OF SYSTEMS
[Recent Weight Loss (___ Lbs)] : recent [unfilled] ~Ulb weight loss [Cough] : cough [Joint Stiffness] : joint stiffness [Joint Pain] : joint pain [Negative] : Heme/Lymph

## 2019-12-06 NOTE — HISTORY OF PRESENT ILLNESS
[FreeTextEntry1] : seen in ER last month with chest pain syndrome, initial troponin + but then declining\par noted HERRERA worsening recently

## 2019-12-06 NOTE — PHYSICAL EXAM
[General Appearance - Well Developed] : well developed [Normal Appearance] : normal appearance [Well Groomed] : well groomed [General Appearance - Well Nourished] : well nourished [No Deformities] : no deformities [General Appearance - In No Acute Distress] : no acute distress [Normal Conjunctiva] : the conjunctiva exhibited no abnormalities [Normal Oral Mucosa] : normal oral mucosa [Eyelids - No Xanthelasma] : the eyelids demonstrated no xanthelasmas [No Oral Pallor] : no oral pallor [No Oral Cyanosis] : no oral cyanosis [Normal Jugular Venous A Waves Present] : normal jugular venous A waves present [No Jugular Venous Leo A Waves] : no jugular venous leo A waves [Normal Jugular Venous V Waves Present] : normal jugular venous V waves present [Respiration, Rhythm And Depth] : normal respiratory rhythm and effort [Exaggerated Use Of Accessory Muscles For Inspiration] : no accessory muscle use [Heart Rate And Rhythm] : heart rate and rhythm were normal [Auscultation Breath Sounds / Voice Sounds] : lungs were clear to auscultation bilaterally [Heart Sounds] : normal S1 and S2 [Murmurs] : no murmurs present [Abdomen Soft] : soft [Abdomen Tenderness] : non-tender [Abnormal Walk] : normal gait [Abdomen Mass (___ Cm)] : no abdominal mass palpated [Gait - Sufficient For Exercise Testing] : the gait was sufficient for exercise testing [Cyanosis, Localized] : no localized cyanosis [Nail Clubbing] : no clubbing of the fingernails [Affect] : the affect was normal [Oriented To Time, Place, And Person] : oriented to person, place, and time [Petechial Hemorrhages (___cm)] : no petechial hemorrhages [] : no ischemic changes [No Anxiety] : not feeling anxious [Mood] : the mood was normal

## 2019-12-11 ENCOUNTER — INPATIENT (INPATIENT)
Facility: HOSPITAL | Age: 74
LOS: 0 days | Discharge: HOME | DRG: 313 | End: 2019-12-12
Attending: INTERNAL MEDICINE | Admitting: INTERNAL MEDICINE
Payer: COMMERCIAL

## 2019-12-11 VITALS
SYSTOLIC BLOOD PRESSURE: 150 MMHG | HEART RATE: 69 BPM | RESPIRATION RATE: 18 BRPM | TEMPERATURE: 98 F | OXYGEN SATURATION: 98 % | DIASTOLIC BLOOD PRESSURE: 101 MMHG | WEIGHT: 121.92 LBS | HEIGHT: 67 IN

## 2019-12-11 DIAGNOSIS — C61 MALIGNANT NEOPLASM OF PROSTATE: ICD-10-CM

## 2019-12-11 DIAGNOSIS — R13.10 DYSPHAGIA, UNSPECIFIED: ICD-10-CM

## 2019-12-11 DIAGNOSIS — R05 COUGH: ICD-10-CM

## 2019-12-11 DIAGNOSIS — R07.9 CHEST PAIN, UNSPECIFIED: ICD-10-CM

## 2019-12-11 DIAGNOSIS — E87.6 HYPOKALEMIA: ICD-10-CM

## 2019-12-11 DIAGNOSIS — K21.9 GASTRO-ESOPHAGEAL REFLUX DISEASE WITHOUT ESOPHAGITIS: ICD-10-CM

## 2019-12-11 DIAGNOSIS — Z96.659 PRESENCE OF UNSPECIFIED ARTIFICIAL KNEE JOINT: Chronic | ICD-10-CM

## 2019-12-11 LAB
ALBUMIN SERPL ELPH-MCNC: 3.8 G/DL — SIGNIFICANT CHANGE UP (ref 3.3–5)
ALP SERPL-CCNC: 104 U/L — SIGNIFICANT CHANGE UP (ref 40–120)
ALT FLD-CCNC: 8 U/L — LOW (ref 10–45)
ANION GAP SERPL CALC-SCNC: 10 MMOL/L — SIGNIFICANT CHANGE UP (ref 5–17)
ANION GAP SERPL CALC-SCNC: 9 MMOL/L — SIGNIFICANT CHANGE UP (ref 5–17)
AST SERPL-CCNC: 12 U/L — SIGNIFICANT CHANGE UP (ref 10–40)
BILIRUB SERPL-MCNC: 0.5 MG/DL — SIGNIFICANT CHANGE UP (ref 0.2–1.2)
BUN SERPL-MCNC: 10 MG/DL — SIGNIFICANT CHANGE UP (ref 7–23)
BUN SERPL-MCNC: 8 MG/DL — SIGNIFICANT CHANGE UP (ref 7–23)
CALCIUM SERPL-MCNC: 9.2 MG/DL — SIGNIFICANT CHANGE UP (ref 8.4–10.5)
CALCIUM SERPL-MCNC: 9.3 MG/DL — SIGNIFICANT CHANGE UP (ref 8.4–10.5)
CHLORIDE SERPL-SCNC: 105 MMOL/L — SIGNIFICANT CHANGE UP (ref 96–108)
CHLORIDE SERPL-SCNC: 105 MMOL/L — SIGNIFICANT CHANGE UP (ref 96–108)
CK MB CFR SERPL CALC: 1.9 NG/ML — SIGNIFICANT CHANGE UP (ref 0–6.7)
CK MB CFR SERPL CALC: 2 NG/ML — SIGNIFICANT CHANGE UP (ref 0–6.7)
CK SERPL-CCNC: 143 U/L — SIGNIFICANT CHANGE UP (ref 30–200)
CK SERPL-CCNC: 150 U/L — SIGNIFICANT CHANGE UP (ref 30–200)
CO2 SERPL-SCNC: 25 MMOL/L — SIGNIFICANT CHANGE UP (ref 22–31)
CO2 SERPL-SCNC: 28 MMOL/L — SIGNIFICANT CHANGE UP (ref 22–31)
CREAT SERPL-MCNC: 0.86 MG/DL — SIGNIFICANT CHANGE UP (ref 0.5–1.3)
CREAT SERPL-MCNC: 0.93 MG/DL — SIGNIFICANT CHANGE UP (ref 0.5–1.3)
GLUCOSE SERPL-MCNC: 89 MG/DL — SIGNIFICANT CHANGE UP (ref 70–99)
GLUCOSE SERPL-MCNC: 91 MG/DL — SIGNIFICANT CHANGE UP (ref 70–99)
POTASSIUM SERPL-MCNC: 3.4 MMOL/L — LOW (ref 3.5–5.3)
POTASSIUM SERPL-MCNC: 4.1 MMOL/L — SIGNIFICANT CHANGE UP (ref 3.5–5.3)
POTASSIUM SERPL-SCNC: 3.4 MMOL/L — LOW (ref 3.5–5.3)
POTASSIUM SERPL-SCNC: 4.1 MMOL/L — SIGNIFICANT CHANGE UP (ref 3.5–5.3)
PROT SERPL-MCNC: 6.3 G/DL — SIGNIFICANT CHANGE UP (ref 6–8.3)
RAPID RVP RESULT: SIGNIFICANT CHANGE UP
SODIUM SERPL-SCNC: 140 MMOL/L — SIGNIFICANT CHANGE UP (ref 135–145)
SODIUM SERPL-SCNC: 142 MMOL/L — SIGNIFICANT CHANGE UP (ref 135–145)
TROPONIN T SERPL-MCNC: <0.01 NG/ML — SIGNIFICANT CHANGE UP (ref 0–0.01)
TROPONIN T SERPL-MCNC: <0.01 NG/ML — SIGNIFICANT CHANGE UP (ref 0–0.01)

## 2019-12-11 PROCEDURE — 99285 EMERGENCY DEPT VISIT HI MDM: CPT

## 2019-12-11 PROCEDURE — 71046 X-RAY EXAM CHEST 2 VIEWS: CPT | Mod: 26

## 2019-12-11 PROCEDURE — 99223 1ST HOSP IP/OBS HIGH 75: CPT

## 2019-12-11 RX ORDER — ENOXAPARIN SODIUM 100 MG/ML
40 INJECTION SUBCUTANEOUS EVERY 24 HOURS
Refills: 0 | Status: DISCONTINUED | OUTPATIENT
Start: 2019-12-11 | End: 2019-12-12

## 2019-12-11 RX ORDER — ASPIRIN/CALCIUM CARB/MAGNESIUM 324 MG
81 TABLET ORAL DAILY
Refills: 0 | Status: DISCONTINUED | OUTPATIENT
Start: 2019-12-12 | End: 2019-12-12

## 2019-12-11 RX ORDER — ASPIRIN/CALCIUM CARB/MAGNESIUM 324 MG
325 TABLET ORAL ONCE
Refills: 0 | Status: COMPLETED | OUTPATIENT
Start: 2019-12-11 | End: 2019-12-11

## 2019-12-11 RX ORDER — TAMSULOSIN HYDROCHLORIDE 0.4 MG/1
0.4 CAPSULE ORAL AT BEDTIME
Refills: 0 | Status: DISCONTINUED | OUTPATIENT
Start: 2019-12-11 | End: 2019-12-12

## 2019-12-11 RX ORDER — POTASSIUM CHLORIDE 20 MEQ
40 PACKET (EA) ORAL EVERY 4 HOURS
Refills: 0 | Status: COMPLETED | OUTPATIENT
Start: 2019-12-11 | End: 2019-12-11

## 2019-12-11 RX ORDER — PANTOPRAZOLE SODIUM 20 MG/1
40 TABLET, DELAYED RELEASE ORAL
Refills: 0 | Status: DISCONTINUED | OUTPATIENT
Start: 2019-12-11 | End: 2019-12-12

## 2019-12-11 RX ORDER — OXYCODONE HYDROCHLORIDE 5 MG/1
0 TABLET ORAL
Qty: 0 | Refills: 0 | DISCHARGE

## 2019-12-11 RX ADMIN — Medication 325 MILLIGRAM(S): at 16:46

## 2019-12-11 RX ADMIN — Medication 40 MILLIEQUIVALENT(S): at 18:21

## 2019-12-11 RX ADMIN — ENOXAPARIN SODIUM 40 MILLIGRAM(S): 100 INJECTION SUBCUTANEOUS at 22:43

## 2019-12-11 RX ADMIN — Medication 40 MILLIEQUIVALENT(S): at 22:43

## 2019-12-11 RX ADMIN — TAMSULOSIN HYDROCHLORIDE 0.4 MILLIGRAM(S): 0.4 CAPSULE ORAL at 22:43

## 2019-12-11 NOTE — H&P ADULT - ATTENDING COMMENTS
On Date 12/11/19  Assessment: Patient personally seen and examined myself during rounds, face-to-face, with the NPP     Note read, including vitals, physical findings, laboratory data, and radiological reports.   Agree with above with revisions, if any included below.   - My exam:  General: WN/WD NAD  Neurology: A&Ox3, nonfocal, AHMADI x 4  Head:  Normocephalic, atraumatic  ENT:  Mucosa moist, no ulcerations  Neck:  Supple, no sinuses or palpable masses  Lymphatic:  No palpable cervical, supraclavicular, axillary or inguinal adenopathy  Respiratory: CTA B/L  CV: RRR, S1S2, no murmur  Abdominal: Soft, NT, ND no palpable mass  MSK: No edema, + peripheral pulses, FROM all 4 extremity  Incisions: intact, no erythema or drainage    - My Plan of care:   Continuous telemetry monitoring, frequent hemodynamic checks, daily weights, I/Os, daily 12 Lead ECG, add K and Mg to labs, cycle troponin to peak, consider Adv HF / EP / CTS / Interv. Cardio consultation if intervention is needed.

## 2019-12-11 NOTE — H&P ADULT - HISTORY OF PRESENT ILLNESS
*******SKELETON**********        73yo Male with PMHx of Prostate CA s/p __ and GERD who presented to [facility] ED c/o persistent CP x 3 months. Pain is substernal, non radiating, described as ______, #/10 severity, that occurs with exertion and resolves with rest. He has associated _____. Pt denies ___ palpitations, dizziness, syncope, fatigue, SOB, HERRERA, orthopnea, PND, LE edema, N/V/D, abd pain, fever or chills. Of note, pt previously presented to [facility] ED on 10/14/19 for similar chest pain, however not as severe. At that time pt was discharge from the ER and instructed to f/u with Dr. Esteves, in which the pt has not yet followed up since then.  On arrival to ED, /101 repeat 153/91, HR 69bpm, T 97.9, RR 18, O2 98% RA; labs significant for trop negative x 1, K 3.4; EKG revealed NSR with no ST-T changes; UA normal; CXR revealed no infiltrate with hyperinflation of lung suggestive of COPD.  Pt now admitted [email or web address] for further management of CP and R/O ACS. *******SKELETON**********        73yo Male with PMHx of Prostate CA s/p __ and GERD who presented to [facility] ED c/o persistent CP x 3 months. Pain is substernal, non radiating, described as ______, #/10 severity, that occurs with exertion and resolves with rest. He has associated _____. Pt denies ___ palpitations, dizziness, syncope, fatigue, SOB, HERRERA, orthopnea, PND, LE edema, N/V/D, abd pain, fever or chills. Of note, pt previously presented to [facility] ED on 10/14/19 for similar chest pain, however not as severe. At that time pt was discharge from the ER and instructed to f/u with Dr. Esteves, in which the pt has not yet followed up since then.  On arrival to ED, /101 repeat 153/91, HR 69bpm, T 97.9, RR 18, O2 98% RA; labs significant for trop negative x 1, K 3.4; EKG revealed NSR with no ST-T changes; UA normal; CXR revealed no infiltrate with hyperinflation of lung suggestive of COPD. Pt received ASA 325mg PO x 1 in ED.  Pt now admitted to [facility] for further management of CP and R/O ACS. 73yo Turkmen/English speaking Male with FHx of MI (son at 33yo), PMHx of Prostate CA (s/p radiation and currently receiving Lupron injections) and GERD who presented to [facility] ED 12/11/19 c/o persistent CP x 3 months. Pain is substernal, non radiating, described as sharp, 6/10 severity, that occurs when walking 3-4 blocks and resolves with rest, sometimes exacerbated by cough. He has associated HERRERA with 3-4 blocks that resolves with rest. Pt endorses persistent dry cough x 4 months and solid dysphagia leading to weight loss, however pt had a normal endoscopy/colonoscopy last month. Pt denies palpitations, dizziness, syncope, fatigue, orthopnea, PND, LE edema, N/V/D, abd pain, fever or chills. Of note, pt previously presented to [facility] ED on 10/14/19 for similar chest pain, however now more severe. At that time pt was discharge from the ER and instructed to f/u with Dr. Esteves, in which the pt has not yet followed up.  On arrival to ED, /101 repeat 153/91, HR 69bpm, T 97.9, RR 18, O2 98% RA; labs significant for trop negative x 1, K 3.4; EKG revealed NSR with no ST-T changes; UA normal; CXR revealed no infiltrate with hyperinflation of lung suggestive of COPD. Pt received ASA 325mg PO x 1 and potassium 40meq x 1 in ED.  Pt now admitted to [facility] for further management of CP and R/O ACS.

## 2019-12-11 NOTE — H&P ADULT - ASSESSMENT
75yo Yi/English speaking Male with FHx of MI (son at 35yo), PMHx of Prostate CA (s/p radiation and currently receiving Lupron injections) and GERD who presented to [facility] ED c/o worsening exertional CP with associated HERRERA who is now admitted to [facility] for further management of CP and R/O ACS.

## 2019-12-11 NOTE — H&P ADULT - PROBLEM SELECTOR PLAN 3
pt endorses persistent dry cough x 4 months  -WBC WNL, afebrile, non productive  -CXR revealed no infiltrate with hyperinflation of lung suggestive of COPD  -f/u RVP results  -consult pulm in AM

## 2019-12-11 NOTE — ED PROVIDER NOTE - OBJECTIVE STATEMENT
74 year old male with history of prostate CA, GERD presents to ED with concern for chest pain, worse with exertion over the past several months.  Patient states he was seen in ED for similar symptoms several months ago and advised to follow up with cardiologist, though has not yet done so.  He notes symptoms improve with rest.  He denies associated fever, chills, headaches, visual changes, abdominal pain, nausea, emesis, changes to bowel movements, peripheral edema or any additional acute complaints or concerns at this time.

## 2019-12-11 NOTE — H&P ADULT - PROBLEM SELECTOR PLAN 6
endorses acid reflux every AM   -start pantoprazole 40mg QAM      VTE ppx: Lovenox SQ  Dispo: pending CCTA and echo tmrw

## 2019-12-11 NOTE — ED PROVIDER NOTE - ENMT, MLM
Airway patent, Nasal mucosa clear. Mouth with normal mucosa. Throat has no vesicles, no oropharyngeal exudates and uvula is midline.
WDL

## 2019-12-11 NOTE — H&P ADULT - PROBLEM SELECTOR PLAN 4
pt endorses solid dysphagia causing weight loss  -pt had an endoscopy/colonoscopy last month, WNL other than benign colonic polyp s/p removal, pending bx  -consult speech and swallow in AM

## 2019-12-11 NOTE — ED ADULT TRIAGE NOTE - CHIEF COMPLAINT QUOTE
73 y/o male came in c/o chest pain, dizziness for 3 months, hx of vertigo, HTN, prostate CA. pt also c/o weight loss 20 lbs in the past 2 months.

## 2019-12-11 NOTE — H&P ADULT - PROBLEM SELECTOR PLAN 1
pt currently CP free s/p ASA 325mg x 1 in ED  -trop negative x 1, f/u 6pm and 10pm  -EKG NSR with no ST-T changes  - pt currently CP free s/p ASA 325mg x 1 in ED  -trop negative x 1, f/u 6pm and 10pm  -EKG NSR with no ST-T changes  -Echo ordered for AM  -plan for CCTA tmrw, NPO after MN  -continue ASA 81mg

## 2019-12-11 NOTE — H&P ADULT - PROBLEM SELECTOR PLAN 5
s/p radiation and currently on Lupron injections  -continue home tamsulosin 0.4mg and _______ s/p radiation and currently on Lupron injections  -continue home tamsulosin 0.4mg

## 2019-12-11 NOTE — ED ADULT NURSE NOTE - INTERVENTIONS DEFINITIONS
[place] to call system/Provide visual cue, wrist band, yellow gown, etc./Physically safe environment: no spills, clutter or unnecessary equipment/Stretcher in lowest position, wheels locked, appropriate side rails in place

## 2019-12-11 NOTE — H&P ADULT - PROBLEM SELECTOR PLAN 2
K 3.4 on admission  -EKG revealed no ST-T changes  -Potassium 40meq PO x 2 ordered  -f/u repeat BMP at 10P

## 2019-12-11 NOTE — H&P ADULT - NSICDXPASTMEDICALHX_GEN_ALL_CORE_FT
PAST MEDICAL HISTORY:  Adenoma of colon     Arthritis     DDD (degenerative disc disease), cervical     Diverticulosis     Dyspepsia     GERD (gastroesophageal reflux disease)     Neoplasm of lung     Prostate cancer     PVD (peripheral vascular disease)     Tendonitis     Vitamin B 12 deficiency PAST MEDICAL HISTORY:  Adenoma of colon     Arthritis     DDD (degenerative disc disease), cervical     Diverticulosis     Dyspepsia     GERD (gastroesophageal reflux disease)     Prostate cancer     PVD (peripheral vascular disease)     Tendonitis     Vitamin B 12 deficiency

## 2019-12-11 NOTE — ED PROVIDER NOTE - CLINICAL SUMMARY MEDICAL DECISION MAKING FREE TEXT BOX
Patient in ED w concern for chest pain, ongoing for weeks and worse with exertion.  Initial trop negative.  No active discomfort in ED.  Given ASA.  Patient recently seen several weeks ago in ED w similar symptoms and instructed to follow up with Dr. Esteves, though has not yet followed up.  Given concern for duration of symptoms and no outpatient cardiac follow up to date, case is discussed with Dr. Esteves who is agreeable to admission with further cardiac eval in patient.  Plan is discussed with patient who is also in agreement.  Will admit at this time.

## 2019-12-11 NOTE — ED ADULT NURSE NOTE - OBJECTIVE STATEMENT
Pt c/o of squeezing chest pain, unintentional weight loss of 20 lbs, and sob upon exertion, dizziness for past 3 months. states he has Hx of Vertigo, prostate cancer, and HTN.  Pt denies loss of appetite but states he has difficulty in swallowing. pt is speaking in clear, complete sentences. denies any fever/chills, nausea/vomiting, bowel/bladder complaints c.o non productive cough for one week.

## 2019-12-11 NOTE — H&P ADULT - NSHPLABSRESULTS_GEN_ALL_CORE
13.8   5.63  )-----------( 302      ( 11 Dec 2019 13:06 )             43.0           142  |  105  |  8   ----------------------------<  89  3.4<L>   |  28  |  0.86    Ca    9.2      11 Dec 2019 15:25  Mg     2.0         TPro  6.3  /  Alb  3.8  /  TBili  0.5  /  DBili  x   /  AST  12  /  ALT  8<L>  /  AlkPhos  104  12-      PTT - ( 11 Dec 2019 13:06 )  PTT:30.5 sec    CARDIAC MARKERS ( 11 Dec 2019 13:06 )  x     / <0.01 ng/mL / See Note U/L / x     / 2.5 ng/mL        Urinalysis Basic - ( 11 Dec 2019 13:44 )    Color: Yellow / Appearance: Clear / S.015 / pH: x  Gluc: x / Ketone: NEGATIVE  / Bili: Negative / Urobili: 0.2 E.U./dL   Blood: x / Protein: NEGATIVE mg/dL / Nitrite: NEGATIVE   Leuk Esterase: NEGATIVE / RBC: x / WBC x   Sq Epi: x / Non Sq Epi: x / Bacteria: x        EKG: NSR, 80bpm, no ST-T changes

## 2019-12-12 ENCOUNTER — TRANSCRIPTION ENCOUNTER (OUTPATIENT)
Age: 74
End: 2019-12-12

## 2019-12-12 VITALS — TEMPERATURE: 98 F

## 2019-12-12 LAB
ANION GAP SERPL CALC-SCNC: 9 MMOL/L — SIGNIFICANT CHANGE UP (ref 5–17)
BUN SERPL-MCNC: 9 MG/DL — SIGNIFICANT CHANGE UP (ref 7–23)
CALCIUM SERPL-MCNC: 9.3 MG/DL — SIGNIFICANT CHANGE UP (ref 8.4–10.5)
CHLORIDE SERPL-SCNC: 104 MMOL/L — SIGNIFICANT CHANGE UP (ref 96–108)
CHOLEST SERPL-MCNC: 130 MG/DL — SIGNIFICANT CHANGE UP (ref 10–199)
CO2 SERPL-SCNC: 24 MMOL/L — SIGNIFICANT CHANGE UP (ref 22–31)
CREAT SERPL-MCNC: 0.83 MG/DL — SIGNIFICANT CHANGE UP (ref 0.5–1.3)
GLUCOSE SERPL-MCNC: 83 MG/DL — SIGNIFICANT CHANGE UP (ref 70–99)
HBA1C BLD-MCNC: 5.4 % — SIGNIFICANT CHANGE UP (ref 4–5.6)
HCT VFR BLD CALC: 42.6 % — SIGNIFICANT CHANGE UP (ref 39–50)
HCV AB S/CO SERPL IA: 0.08 S/CO — SIGNIFICANT CHANGE UP
HCV AB SERPL-IMP: SIGNIFICANT CHANGE UP
HDLC SERPL-MCNC: 43 MG/DL — SIGNIFICANT CHANGE UP
HGB BLD-MCNC: 13.5 G/DL — SIGNIFICANT CHANGE UP (ref 13–17)
LIPID PNL WITH DIRECT LDL SERPL: 72 MG/DL — SIGNIFICANT CHANGE UP
MAGNESIUM SERPL-MCNC: 2 MG/DL — SIGNIFICANT CHANGE UP (ref 1.6–2.6)
MCHC RBC-ENTMCNC: 31.3 PG — SIGNIFICANT CHANGE UP (ref 27–34)
MCHC RBC-ENTMCNC: 31.7 GM/DL — LOW (ref 32–36)
MCV RBC AUTO: 98.6 FL — SIGNIFICANT CHANGE UP (ref 80–100)
NRBC # BLD: 0 /100 WBCS — SIGNIFICANT CHANGE UP (ref 0–0)
PLATELET # BLD AUTO: 266 K/UL — SIGNIFICANT CHANGE UP (ref 150–400)
POTASSIUM SERPL-MCNC: 4.4 MMOL/L — SIGNIFICANT CHANGE UP (ref 3.5–5.3)
POTASSIUM SERPL-SCNC: 4.4 MMOL/L — SIGNIFICANT CHANGE UP (ref 3.5–5.3)
RBC # BLD: 4.32 M/UL — SIGNIFICANT CHANGE UP (ref 4.2–5.8)
RBC # FLD: 13.9 % — SIGNIFICANT CHANGE UP (ref 10.3–14.5)
SODIUM SERPL-SCNC: 137 MMOL/L — SIGNIFICANT CHANGE UP (ref 135–145)
TOTAL CHOLESTEROL/HDL RATIO MEASUREMENT: 3 RATIO — LOW (ref 3.4–9.6)
TRIGL SERPL-MCNC: 76 MG/DL — SIGNIFICANT CHANGE UP (ref 10–149)
WBC # BLD: 4.72 K/UL — SIGNIFICANT CHANGE UP (ref 3.8–10.5)
WBC # FLD AUTO: 4.72 K/UL — SIGNIFICANT CHANGE UP (ref 3.8–10.5)

## 2019-12-12 PROCEDURE — 93306 TTE W/DOPPLER COMPLETE: CPT

## 2019-12-12 PROCEDURE — 99238 HOSP IP/OBS DSCHRG MGMT 30/<: CPT

## 2019-12-12 PROCEDURE — 36415 COLL VENOUS BLD VENIPUNCTURE: CPT

## 2019-12-12 PROCEDURE — 75574 CT ANGIO HRT W/3D IMAGE: CPT

## 2019-12-12 PROCEDURE — 85025 COMPLETE CBC W/AUTO DIFF WBC: CPT

## 2019-12-12 PROCEDURE — 83735 ASSAY OF MAGNESIUM: CPT

## 2019-12-12 PROCEDURE — 87798 DETECT AGENT NOS DNA AMP: CPT

## 2019-12-12 PROCEDURE — 82550 ASSAY OF CK (CPK): CPT

## 2019-12-12 PROCEDURE — 87633 RESP VIRUS 12-25 TARGETS: CPT

## 2019-12-12 PROCEDURE — 83880 ASSAY OF NATRIURETIC PEPTIDE: CPT

## 2019-12-12 PROCEDURE — 85730 THROMBOPLASTIN TIME PARTIAL: CPT

## 2019-12-12 PROCEDURE — 86803 HEPATITIS C AB TEST: CPT

## 2019-12-12 PROCEDURE — 81003 URINALYSIS AUTO W/O SCOPE: CPT

## 2019-12-12 PROCEDURE — 75574 CT ANGIO HRT W/3D IMAGE: CPT | Mod: 26

## 2019-12-12 PROCEDURE — 85027 COMPLETE CBC AUTOMATED: CPT

## 2019-12-12 PROCEDURE — 93306 TTE W/DOPPLER COMPLETE: CPT | Mod: 26

## 2019-12-12 PROCEDURE — 80053 COMPREHEN METABOLIC PANEL: CPT

## 2019-12-12 PROCEDURE — 87581 M.PNEUMON DNA AMP PROBE: CPT

## 2019-12-12 PROCEDURE — 84484 ASSAY OF TROPONIN QUANT: CPT

## 2019-12-12 PROCEDURE — 87486 CHLMYD PNEUM DNA AMP PROBE: CPT

## 2019-12-12 PROCEDURE — 83036 HEMOGLOBIN GLYCOSYLATED A1C: CPT

## 2019-12-12 PROCEDURE — 80061 LIPID PANEL: CPT

## 2019-12-12 PROCEDURE — 82553 CREATINE MB FRACTION: CPT

## 2019-12-12 PROCEDURE — 71046 X-RAY EXAM CHEST 2 VIEWS: CPT

## 2019-12-12 PROCEDURE — 99285 EMERGENCY DEPT VISIT HI MDM: CPT

## 2019-12-12 PROCEDURE — 80048 BASIC METABOLIC PNL TOTAL CA: CPT

## 2019-12-12 RX ORDER — PANTOPRAZOLE SODIUM 20 MG/1
1 TABLET, DELAYED RELEASE ORAL
Qty: 30 | Refills: 3
Start: 2019-12-12 | End: 2020-04-09

## 2019-12-12 RX ORDER — INFLUENZA VIRUS VACCINE 15; 15; 15; 15 UG/.5ML; UG/.5ML; UG/.5ML; UG/.5ML
0.5 SUSPENSION INTRAMUSCULAR ONCE
Refills: 0 | Status: DISCONTINUED | OUTPATIENT
Start: 2019-12-12 | End: 2019-12-12

## 2019-12-12 RX ORDER — KETOROLAC TROMETHAMINE 30 MG/ML
15 SYRINGE (ML) INJECTION ONCE
Refills: 0 | Status: DISCONTINUED | OUTPATIENT
Start: 2019-12-12 | End: 2019-12-12

## 2019-12-12 RX ORDER — LOSARTAN POTASSIUM 100 MG/1
1 TABLET, FILM COATED ORAL
Qty: 30 | Refills: 3
Start: 2019-12-12 | End: 2020-04-09

## 2019-12-12 RX ORDER — AMLODIPINE BESYLATE 2.5 MG/1
1 TABLET ORAL
Qty: 30 | Refills: 3
Start: 2019-12-12 | End: 2020-04-09

## 2019-12-12 RX ORDER — AMLODIPINE BESYLATE 2.5 MG/1
5 TABLET ORAL ONCE
Refills: 0 | Status: COMPLETED | OUTPATIENT
Start: 2019-12-12 | End: 2019-12-12

## 2019-12-12 RX ORDER — LOSARTAN POTASSIUM 100 MG/1
25 TABLET, FILM COATED ORAL DAILY
Refills: 0 | Status: DISCONTINUED | OUTPATIENT
Start: 2019-12-12 | End: 2019-12-12

## 2019-12-12 RX ORDER — ACETAMINOPHEN 500 MG
650 TABLET ORAL ONCE
Refills: 0 | Status: COMPLETED | OUTPATIENT
Start: 2019-12-12 | End: 2019-12-12

## 2019-12-12 RX ADMIN — Medication 15 MILLIGRAM(S): at 07:23

## 2019-12-12 RX ADMIN — Medication 100 MILLIGRAM(S): at 03:47

## 2019-12-12 RX ADMIN — AMLODIPINE BESYLATE 5 MILLIGRAM(S): 2.5 TABLET ORAL at 06:57

## 2019-12-12 RX ADMIN — Medication 81 MILLIGRAM(S): at 13:43

## 2019-12-12 RX ADMIN — Medication 15 MILLIGRAM(S): at 07:07

## 2019-12-12 RX ADMIN — PANTOPRAZOLE SODIUM 40 MILLIGRAM(S): 20 TABLET, DELAYED RELEASE ORAL at 06:37

## 2019-12-12 RX ADMIN — Medication 650 MILLIGRAM(S): at 03:47

## 2019-12-12 RX ADMIN — LOSARTAN POTASSIUM 25 MILLIGRAM(S): 100 TABLET, FILM COATED ORAL at 13:43

## 2019-12-12 RX ADMIN — Medication 650 MILLIGRAM(S): at 04:48

## 2019-12-12 NOTE — DIETITIAN INITIAL EVALUATION ADULT. - ENERGY NEEDS
Ht: 67" Wt(12/12): 122lbs IBW: 148lbs (+/-10%), 82%IBW, BMI: 19.1 kg/m2   ABW (55.3 kg) used for EER as pt between % of IBW.  Nutrient needs based on [facility] standards of care for older adults. Needs adjusted for suspected malnutrition, hypermetabolic state.

## 2019-12-12 NOTE — DIETITIAN INITIAL EVALUATION ADULT. - DIET TYPE
Diet, NPO:   NPO for Procedure/Test     NPO Start Date: 12-Dec-2019,   NPO Start Time: 06:32 (12-12-19 @ 06:33)

## 2019-12-12 NOTE — DISCHARGE NOTE PROVIDER - NSDCFUSCHEDAPPT_GEN_ALL_CORE_FT
MOISES GUZMAN ; 12/17/2019 ; [facility] [address]  MOISES GUZMAN ; 12/17/2019 ; [facility] [address]  MOISES GUZMAN ; 12/17/2019 ; [facility] [address] MOISES GUZMAN ; 12/17/2019 ; [facility] [address]  MOISES GUZMAN ; 12/17/2019 ; [facility] [address]  MOISES GUZMAN ; 12/17/2019 ; [facility] [address]

## 2019-12-12 NOTE — DIETITIAN INITIAL EVALUATION ADULT. - OTHER INFO
Pt is a 74 y.o Azeri/English speaking Male with PMHx of Prostate CA (s/p radiation and currently receiving Lupron injections) and GERD who presented to [facility] ED c/o worsening exertional chest pain with associated HERRERA who is now admitted to [facility] for further management of chest pain and R/O ACS.     Pt seen resting in bed, pleasant. Interview conducted via  services ID#: [id number]. Pt reports NPO x 3 days PTA 2/2 getting MRI done outpatient and was admitted to ED right after. Prior to that, pt reports poor PO intake x 3 months 2/2 difficulty swallowing foods since August, typically consuming soups, tea, coffee, soft foods. Confirms NKFA. Pt states UBW 147lbs in August, endorses wt loss. Pt noted w/ admit wt 122lbs, indicating 25lbs (17%) wt loss x 3 months. Nutrition focused physical exam conducted w/ pt's consent, findings include severe muscle and fat wasting; Suspect pt w/ severe PCM in context of chronic illness based on pt meeting 4 criteria; Please see malnutrition note. Per discussion with PA, pt is currently NPO pending SLP eval. Denies N/V/D/C. +BM 12/11. No pain reported at this time. Skin: Intact noted. Discussed NPO status, benefits/purpose of ONS and explained will be recommended once diet advances for additional kcal/protein intake. Pt appeared receptive. RD to f/u per protocol.

## 2019-12-12 NOTE — DIETITIAN INITIAL EVALUATION ADULT. - MALNUTRITION
1) <75% EER x > 1 month 2) 17% wt loss x 3 months 3) Severe muscle wasting in temporal, shoulder, clavicle, interosseous regions 4) Severe fat wasting in buccal fat pads, upper arm regions; Suspect pt w/ severe PCM in context of chronic illness based on pt meeting 4 criteria. (Suspect severe PCM in context of chronic illness)

## 2019-12-12 NOTE — PATIENT PROFILE ADULT - STATED REASON FOR ADMISSION
Pain in legs when walking. Pain in chest and shoulders. Lack of appetite and losing weight. Persistant dry cough.

## 2019-12-12 NOTE — PHYSICAL THERAPY INITIAL EVALUATION ADULT - ADDITIONAL COMMENTS
Pt lives in elevator building without stairs, has a hx of 1 fall several weeks ago. Since fall, pt has had functional decline due to b/l knee pain R>L causing pt to use a cane. Otherwise pt is independent at baseline.

## 2019-12-12 NOTE — PHYSICAL THERAPY INITIAL EVALUATION ADULT - GAIT DEVIATIONS NOTED, PT EVAL
decreased stride length/decreased velocity of limb motion/decreased char/decreased step length/decreased weight-shifting ability

## 2019-12-12 NOTE — DISCHARGE NOTE PROVIDER - CARE PROVIDER_API CALL
Will Bradshaw)  Cardiovascular Disease; Internal Medicine; Nuclear Cardiology  [address] [place] [address]  Phone: [phone]  Fax: [phone]  Established Patient  Follow Up Time: 2 weeks

## 2019-12-12 NOTE — DISCHARGE NOTE PROVIDER - HOSPITAL COURSE
73yo Yi/English speaking Male with FHx of MI (son at 33yo), PMHx of Prostate CA (s/p radiation and currently receiving Lupron injections) and GERD who presented to [facility] ED 12/11/19 c/o persistent CP x 3 months. Pain is substernal, non radiating, described as sharp, 6/10 severity, that occurs when walking 3-4 blocks and resolves with rest, sometimes exacerbated by cough. He has associated HERRERA with 3-4 blocks that resolves with rest. Pt endorses persistent dry cough x 4 months and solid dysphagia leading to weight loss, however pt had a normal endoscopy/colonoscopy last month. Pt denies palpitations, dizziness, syncope, fatigue, orthopnea, PND, LE edema, N/V/D, abd pain, fever or chills. Of note, pt previously presented to [facility] ED on 10/14/19 for similar chest pain, however now more severe. At that time pt was discharge from the ER and instructed to f/u with Dr. Esteves, in which the pt has not yet followed up.    On arrival to ED, /101 repeat 153/91, HR 69bpm, T 97.9, RR 18, O2 98% RA; labs significant for trop negative x 1, K 3.4; EKG revealed NSR with no ST-T changes; UA normal; CXR revealed no infiltrate with hyperinflation of lung suggestive of COPD. Pt received ASA 325mg PO x 1 and potassium 40meq x 1 in ED.    Pt now admitted to [facility] for further management of CP and R/O ACS.    While on [facility], Trop negative x 3. CCTA with zero calcium score and normal coronaries. Echo revealed    Pt was seen and evaluated by speech and swallow for dyspagia.    Nutrition was consulted for pt's weight loss 73yo Setswana/English speaking Male with FHx of MI (son at 33yo), PMHx of Prostate CA (s/p radiation and currently receiving Lupron injections) and GERD who presented to [facility] ED 12/11/19 c/o persistent CP x 3 months. Pain is substernal, non radiating, described as sharp, 6/10 severity, that occurs when walking 3-4 blocks and resolves with rest, sometimes exacerbated by cough. He has associated HERRERA with 3-4 blocks that resolves with rest. Pt endorses persistent dry cough x 4 months and solid dysphagia leading to weight loss, however pt had a normal endoscopy/colonoscopy last month. Pt denies palpitations, dizziness, syncope, fatigue, orthopnea, PND, LE edema, N/V/D, abd pain, fever or chills. Of note, pt previously presented to [facility] ED on 10/14/19 for similar chest pain, however now more severe. At that time pt was discharge from the ER and instructed to f/u with Dr. Esteves, in which the pt has not yet followed up.    On arrival to ED, /101 repeat 153/91, HR 69bpm, T 97.9, RR 18, O2 98% RA; labs significant for trop negative x 1, K 3.4; EKG revealed NSR with no ST-T changes; UA normal; CXR revealed no infiltrate with hyperinflation of lung suggestive of COPD. Pt received ASA 325mg PO x 1 and potassium 40meq x 1 in ED.    Pt now admitted to [facility] for further management of CP and R/O ACS.    While on [facility], Trop negative x 3. CCTA with zero calcium score and normal coronaries. Echo with no significant findings.     Pt did not want to wait for speech and swallow evaluation. Pt cleared by PT for discharge home with no needs.

## 2019-12-12 NOTE — DIETITIAN INITIAL EVALUATION ADULT. - PROBLEM SELECTOR PLAN 1
pt currently CP free s/p ASA 325mg x 1 in ED  -trop negative x 1, f/u 6pm and 10pm  -EKG NSR with no ST-T changes  -Echo ordered for AM  -plan for CCTA tmrw, NPO after MN  -continue ASA 81mg

## 2019-12-12 NOTE — DIETITIAN INITIAL EVALUATION ADULT. - ADD RECOMMEND
1) When medically feasible, recommend regular diet + Ensure Enlive TID (provides 1050 kcal, 60g protein). Texture/consistency deferred to SLP. 2) Encourage PO intake. 3) Monitor lytes and replete prn. 4) Obtain and monitor wt trends.

## 2019-12-12 NOTE — DISCHARGE NOTE NURSING/CASE MANAGEMENT/SOCIAL WORK - PATIENT PORTAL LINK FT
You can access the FollowMyHealth Patient Portal offered by [facility] by registering at the following website: [email or web address]. By joining [facility]’s FollowMyHealth portal, you will also be able to view your health information using other applications (apps) compatible with our system.

## 2019-12-12 NOTE — CHART NOTE - NSCHARTNOTEFT_GEN_A_CORE
Upon Nutritional Assessment by the Registered Dietitian your patient was determined to meet criteria / has evidence of the following diagnosis/diagnoses:          [ ]  Mild Protein Calorie Malnutrition        [ ]  Moderate Protein Calorie Malnutrition        [ X ] Severe Protein Calorie Malnutrition        [ ] Unspecified Protein Calorie Malnutrition        [ ] Underweight / BMI <19        [ ] Morbid Obesity / BMI > 40      Findings as based on:  •  Comprehensive nutrition assessment and consultation  1) <75% EER x > 1 month   2) 17% wt loss x 3 months  3) Severe muscle wasting in temporal, shoulder, clavicle, interosseous regions   4) Severe fat wasting in buccal fat pads, upper arm regions; Suspect pt w/ severe PCM in context of chronic illness based on pt meeting 4 criteria.       Treatment:    The following diet has been recommended:  1) When medically feasible, recommend regular diet + Ensure Enlive TID (provides 1050 kcal, 60g protein). Texture/consistency deferred to SLP.   2) Encourage PO intake.   3) Monitor lytes and replete prn.   4) Obtain and monitor wt trends.      PROVIDER Section:     By signing this assessment you are acknowledging and agree with the diagnosis/diagnoses assigned by the Registered Dietitian    Comments:

## 2019-12-12 NOTE — PHYSICAL THERAPY INITIAL EVALUATION ADULT - PERTINENT HX OF CURRENT PROBLEM, REHAB EVAL
74 year old male who presented to [facility] ED 12/11/19 c/o persistent CP x 3 months. Pain is substernal, non radiating, described as sharp, 6/10 severity, that occurs when walking 3-4 blocks and resolves with rest, sometimes exacerbated by cough. He has associated HERRERA with 3-4 blocks that resolves with rest.

## 2019-12-12 NOTE — DISCHARGE NOTE PROVIDER - NSDCCPCAREPLAN_GEN_ALL_CORE_FT
PRINCIPAL DISCHARGE DIAGNOSIS  Diagnosis: Chest pain, unspecified type  Assessment and Plan of Treatment: You came in to the hospital with chest pain. Your blood work, EKG, Cardiac CT scan, and echocardiogram showed no signs of heart attack or blockages. You should schedule an appointment to follow up with Dr. Bradshaw within 1-2 weeks.      SECONDARY DISCHARGE DIAGNOSES  Diagnosis: Hypertension  Assessment and Plan of Treatment: We have started you on Amlodipine 5mg once daily each morning and Losartan 25mg once daily each night to help control your blood pressure. Please make sure to follow up with your primary care doctor and Dr. Bradshaw within 1-2 weeks.    Diagnosis: History of gastroesophageal reflux (GERD)  Assessment and Plan of Treatment: We have started you on Pantoprazole 40mg once daily to help with your symptoms of acid reflux/gastritis.    Diagnosis: Weight loss  Assessment and Plan of Treatment: Please make sure to follow up with your primary care doctor within 1-2 weeks for further workup regarding your unintentional weight loss and your difficulty swallowing.

## 2019-12-13 NOTE — PROGRESS NOTE ADULT - SUBJECTIVE AND OBJECTIVE BOX
On Date 12/12/19  Discharge: Patient personally seen and examined myself, Face-to-Face, 28minutes:  My discharge exam:  General: WN/WD NAD  Neurology: A&Ox3, nonfocal, AHMADI x 4  Head:  Normocephalic, atraumatic  ENT:  Mucosa moist, no ulcerations  Neck:  Supple, no sinuses or palpable masses  Lymphatic:  No palpable cervical, supraclavicular, axillary or inguinal adenopathy  Respiratory: CTA B/L  CV: RRR, S1S2, no murmur  Abdominal: Soft, NT, ND no palpable mass  MSK: No edema, + peripheral pulses, FROM all 4 extremity  Incisions: intact, no erythema or drainage      My Discussion of Hospital Stay:  This document is complete and the patient is ready for discharge. (12 Dec 2019 15:53)      Discharge Note Provider (complete) [PRAKASH Gallagher] (12 Dec 2019 15:53)    Hospital Course:  Discharge Date  12-Dec-2019  Admission Date  11-Dec-2019 16:40  Reason for Admission  chest pain  Hospital Course    75yo German/English speaking Male with FHx of MI (son at 33yo), PMHx of Prostate CA (s/p radiation and currently receiving Lupron injections) and GERD who presented to [facility] ED 12/11/19 c/o persistent CP x 3 months. Pain is substernal, non radiating, described as sharp, 6/10 severity, that occurs when walking 3-4 blocks and resolves with rest, sometimes exacerbated by cough. He has associated HERRERA with 3-4 blocks that resolves with rest. Pt endorses persistent dry cough x 4 months and solid dysphagia leading to weight loss, however pt had a normal endoscopy/colonoscopy last month. Pt denies palpitations, dizziness, syncope, fatigue, orthopnea, PND, LE edema, N/V/D, abd pain, fever or chills. Of note, pt previously presented to [facility] ED on 10/14/19 for similar chest pain, however now more severe. At that time pt was discharge from the ER and instructed to f/u with Dr. Esteves, in which the pt has not yet followed up.  On arrival to ED, /101 repeat 153/91, HR 69bpm, T 97.9, RR 18, O2 98% RA; labs significant for trop negative x 1, K 3.4; EKG revealed NSR with no ST-T changes; UA normal; CXR revealed no infiltrate with hyperinflation of lung suggestive of COPD. Pt received ASA 325mg PO x 1 and potassium 40meq x 1 in ED.  Pt now admitted to [facility] for further management of CP and R/O ACS.  While on 5 Uris, Trop negative x 3. CCTA with zero calcium score and normal coronaries. Echo with no significant findings.   Pt did not want to wait for speech and swallow evaluation. Pt cleared by PT for discharge home with no needs.       Med Reconciliation:  Medication Reconciliation Status  Admission Reconciliation is Completed  Discharge Reconciliation is Completed  Discharge Medications  amLODIPine 5 mg oral tablet: 1 tab(s) orally once a day   Flomax 0.4 mg oral capsule: 1 cap(s) orally once a day  losartan 25 mg oral tablet: 1 tab(s) orally once a day  Lupron: subcutaneous every 3 months  pantoprazole 40 mg oral delayed release tablet: 1 tab(s) orally once a day (before a meal)    Care Plan/Procedures:  Goal(s)  To get better and follow your care plan as instructed.  Discharge Diagnoses, Assessment and Plan of Treatment  PRINCIPAL DISCHARGE DIAGNOSIS  Diagnosis: Chest pain, unspecified type  Assessment and Plan of Treatment: You came in to the hospital with chest pain. Your blood work, EKG, Cardiac CT scan, and echocardiogram showed no signs of heart attack or blockages. You should schedule an appointment to follow up with Dr. Bradshaw within 1-2 weeks.      SECONDARY DISCHARGE DIAGNOSES  Diagnosis: Hypertension  Assessment and Plan of Treatment: We have started you on Amlodipine 5mg once daily each morning and Losartan 25mg once daily each night to help control your blood pressure. Please make sure to follow up with your primary care doctor and Dr. Bradshaw within 1-2 weeks.    Diagnosis: History of gastroesophageal reflux (GERD)  Assessment and Plan of Treatment: We have started you on Pantoprazole 40mg once daily to help with your symptoms of acid reflux/gastritis.    Diagnosis: Weight loss  Assessment and Plan of Treatment: Please make sure to follow up with your primary care doctor within 1-2 weeks for further workup regarding your unintentional weight loss and your difficulty swallowing.        Follow Up:  Care Providers for Follow up (PCP/Outpatient Provider)  Will Bradshaw)  Cardiovascular Disease; Internal Medicine; Nuclear Cardiology  [address] [place] [id number]  Phone: [phone]  Fax: [phone]  Established Patient  Follow Up Time: 2 weeks    Patient's Scheduled Appointments  MOISES GUZMAN ; 12/17/2019 ; NPP [facility] [address]  MOISES GUZMAN ; 12/17/2019 ; [facility]PP [facility] [address] [place] [address]  MOISES GUZMAN ; 12/17/2019 ; NPP [facility] [address]  Additional Scheduled Appointments  Please make sure to follow up with Dr. Bradshaw and your primary care doctor within 1-2 weeks.   Diet Instructions  Please make sure to follow a low sodium, low fat heart healthy diet.   Activity  No restrictions    Quality Measures:  Does the patient have difficulty running errands alone like visiting a doctor’s office or shopping?  No   Does the patient have difficulty climbing stairs?  No   Patient Condition  Stable   Hospice Patient  No  Cognition: The patient has  No difficulties   Did the patient present with or suffer from an ischemic stroke, hemorrhagic stroke or TIA during this admission?  No  Has the patient had an Acute Myocardial Infarction?  No  Has the patient had a Percutaneous Coronary Intervention?  No    Document Complete:  Care Provider Seen in [facility]  Yung Mcneil PA-C  Physician Section Complete  This document is complete and the patient is ready for discharge.   For questions about your prescriptions, please call:  [phone]  Is this contact telephone number correct?  Yes

## 2019-12-16 DIAGNOSIS — K57.90 DIVERTICULOSIS OF INTESTINE, PART UNSPECIFIED, WITHOUT PERFORATION OR ABSCESS WITHOUT BLEEDING: ICD-10-CM

## 2019-12-16 DIAGNOSIS — K21.9 GASTRO-ESOPHAGEAL REFLUX DISEASE WITHOUT ESOPHAGITIS: ICD-10-CM

## 2019-12-16 DIAGNOSIS — J44.9 CHRONIC OBSTRUCTIVE PULMONARY DISEASE, UNSPECIFIED: ICD-10-CM

## 2019-12-16 DIAGNOSIS — I73.9 PERIPHERAL VASCULAR DISEASE, UNSPECIFIED: ICD-10-CM

## 2019-12-16 DIAGNOSIS — M50.30 OTHER CERVICAL DISC DEGENERATION, UNSPECIFIED CERVICAL REGION: ICD-10-CM

## 2019-12-16 DIAGNOSIS — Z96.659 PRESENCE OF UNSPECIFIED ARTIFICIAL KNEE JOINT: ICD-10-CM

## 2019-12-16 DIAGNOSIS — I10 ESSENTIAL (PRIMARY) HYPERTENSION: ICD-10-CM

## 2019-12-16 DIAGNOSIS — R63.4 ABNORMAL WEIGHT LOSS: ICD-10-CM

## 2019-12-16 DIAGNOSIS — E87.6 HYPOKALEMIA: ICD-10-CM

## 2019-12-16 DIAGNOSIS — C61 MALIGNANT NEOPLASM OF PROSTATE: ICD-10-CM

## 2019-12-16 DIAGNOSIS — R13.10 DYSPHAGIA, UNSPECIFIED: ICD-10-CM

## 2019-12-16 DIAGNOSIS — R07.9 CHEST PAIN, UNSPECIFIED: ICD-10-CM

## 2019-12-16 DIAGNOSIS — E53.8 DEFICIENCY OF OTHER SPECIFIED B GROUP VITAMINS: ICD-10-CM

## 2019-12-17 ENCOUNTER — APPOINTMENT (OUTPATIENT)
Dept: HEART AND VASCULAR | Facility: CLINIC | Age: 74
End: 2019-12-17
Payer: COMMERCIAL

## 2019-12-17 VITALS — DIASTOLIC BLOOD PRESSURE: 80 MMHG | SYSTOLIC BLOOD PRESSURE: 120 MMHG | HEART RATE: 85 BPM

## 2019-12-17 DIAGNOSIS — R07.9 CHEST PAIN, UNSPECIFIED: ICD-10-CM

## 2019-12-17 DIAGNOSIS — R06.09 OTHER FORMS OF DYSPNEA: ICD-10-CM

## 2019-12-17 PROCEDURE — 99214 OFFICE O/P EST MOD 30 MIN: CPT | Mod: 25

## 2019-12-17 PROCEDURE — 93306 TTE W/DOPPLER COMPLETE: CPT

## 2019-12-17 PROCEDURE — 93015 CV STRESS TEST SUPVJ I&R: CPT

## 2019-12-17 NOTE — PHYSICAL EXAM
[General Appearance - Well Developed] : well developed [Normal Appearance] : normal appearance [General Appearance - Well Nourished] : well nourished [Well Groomed] : well groomed [General Appearance - In No Acute Distress] : no acute distress [No Deformities] : no deformities [Normal Conjunctiva] : the conjunctiva exhibited no abnormalities [Eyelids - No Xanthelasma] : the eyelids demonstrated no xanthelasmas [No Oral Pallor] : no oral pallor [Normal Oral Mucosa] : normal oral mucosa [Normal Jugular Venous A Waves Present] : normal jugular venous A waves present [No Oral Cyanosis] : no oral cyanosis [Normal Jugular Venous V Waves Present] : normal jugular venous V waves present [No Jugular Venous Leo A Waves] : no jugular venous leo A waves [Respiration, Rhythm And Depth] : normal respiratory rhythm and effort [Exaggerated Use Of Accessory Muscles For Inspiration] : no accessory muscle use [Heart Rate And Rhythm] : heart rate and rhythm were normal [Auscultation Breath Sounds / Voice Sounds] : lungs were clear to auscultation bilaterally [Heart Sounds] : normal S1 and S2 [Murmurs] : no murmurs present [Abdomen Soft] : soft [Abdomen Tenderness] : non-tender [Abdomen Mass (___ Cm)] : no abdominal mass palpated [Abnormal Walk] : normal gait [Gait - Sufficient For Exercise Testing] : the gait was sufficient for exercise testing [Cyanosis, Localized] : no localized cyanosis [Nail Clubbing] : no clubbing of the fingernails [Petechial Hemorrhages (___cm)] : no petechial hemorrhages [] : no ischemic changes [Affect] : the affect was normal [Oriented To Time, Place, And Person] : oriented to person, place, and time [Mood] : the mood was normal [No Anxiety] : not feeling anxious

## 2019-12-17 NOTE — REVIEW OF SYSTEMS
[Recent Weight Loss (___ Lbs)] : recent [unfilled] ~Ulb weight loss [Cough] : cough [Joint Pain] : joint pain [Joint Stiffness] : joint stiffness [Negative] : Heme/Lymph

## 2019-12-17 NOTE — DISCUSSION/SUMMARY
[FreeTextEntry1] : CP/SOB- negative non invasive cardiac evaluation, results discussed\par recommend pulmonary evaluation of SOB

## 2020-12-15 NOTE — ED ADULT NURSE NOTE - CAS EDN DISCHARGE ASSESSMENT
[FreeTextEntry1] : 1. L eye injury with concern for corneal abrasion/laceration +/- superimposed infection\par Referred to Dr. Diallo for urgent evaluation\par 
Alert and oriented to person, place and time

## 2021-02-08 ENCOUNTER — EMERGENCY (EMERGENCY)
Facility: HOSPITAL | Age: 76
LOS: 1 days | Discharge: HOME | End: 2021-02-08
Attending: EMERGENCY MEDICINE | Admitting: EMERGENCY MEDICINE
Payer: SELF-PAY

## 2021-02-08 VITALS
DIASTOLIC BLOOD PRESSURE: 82 MMHG | HEART RATE: 68 BPM | TEMPERATURE: 99 F | OXYGEN SATURATION: 97 % | RESPIRATION RATE: 18 BRPM | SYSTOLIC BLOOD PRESSURE: 137 MMHG

## 2021-02-08 VITALS
TEMPERATURE: 99 F | SYSTOLIC BLOOD PRESSURE: 160 MMHG | RESPIRATION RATE: 17 BRPM | WEIGHT: 110.01 LBS | HEIGHT: 67 IN | OXYGEN SATURATION: 100 % | HEART RATE: 60 BPM | DIASTOLIC BLOOD PRESSURE: 94 MMHG

## 2021-02-08 DIAGNOSIS — Z79.899 OTHER LONG TERM (CURRENT) DRUG THERAPY: ICD-10-CM

## 2021-02-08 DIAGNOSIS — R33.9 RETENTION OF URINE, UNSPECIFIED: ICD-10-CM

## 2021-02-08 DIAGNOSIS — Z96.659 PRESENCE OF UNSPECIFIED ARTIFICIAL KNEE JOINT: Chronic | ICD-10-CM

## 2021-02-08 LAB
APPEARANCE UR: CLEAR — SIGNIFICANT CHANGE UP
BACTERIA # UR AUTO: PRESENT /HPF
BILIRUB UR-MCNC: NEGATIVE — SIGNIFICANT CHANGE UP
COLOR SPEC: YELLOW — SIGNIFICANT CHANGE UP
COMMENT - URINE: SIGNIFICANT CHANGE UP
DIFF PNL FLD: ABNORMAL
GLUCOSE UR QL: NEGATIVE — SIGNIFICANT CHANGE UP
HYALINE CASTS # UR AUTO: SIGNIFICANT CHANGE UP /LPF (ref 0–2)
KETONES UR-MCNC: ABNORMAL MG/DL
LEUKOCYTE ESTERASE UR-ACNC: NEGATIVE — SIGNIFICANT CHANGE UP
NITRITE UR-MCNC: NEGATIVE — SIGNIFICANT CHANGE UP
PH UR: 8 — SIGNIFICANT CHANGE UP (ref 5–8)
PROT UR-MCNC: ABNORMAL MG/DL
RBC CASTS # UR COMP ASSIST: > 10 /HPF
SP GR SPEC: 1.02 — SIGNIFICANT CHANGE UP (ref 1–1.03)
UROBILINOGEN FLD QL: 0.2 E.U./DL — SIGNIFICANT CHANGE UP
WBC UR QL: < 5 /HPF — SIGNIFICANT CHANGE UP

## 2021-02-08 PROCEDURE — 81001 URINALYSIS AUTO W/SCOPE: CPT

## 2021-02-08 PROCEDURE — 87086 URINE CULTURE/COLONY COUNT: CPT

## 2021-02-08 PROCEDURE — 51702 INSERT TEMP BLADDER CATH: CPT

## 2021-02-08 PROCEDURE — 99283 EMERGENCY DEPT VISIT LOW MDM: CPT | Mod: 25

## 2021-02-08 PROCEDURE — 99283 EMERGENCY DEPT VISIT LOW MDM: CPT

## 2021-02-08 RX ORDER — TAMSULOSIN HYDROCHLORIDE 0.4 MG/1
1 CAPSULE ORAL
Qty: 14 | Refills: 0
Start: 2021-02-08 | End: 2021-02-21

## 2021-02-08 RX ORDER — TAMSULOSIN HYDROCHLORIDE 0.4 MG/1
1 CAPSULE ORAL
Qty: 0 | Refills: 0 | DISCHARGE

## 2021-02-08 NOTE — ED PROVIDER NOTE - CLINICAL SUMMARY MEDICAL DECISION MAKING FREE TEXT BOX
pt c/o decreased urinary stream and hard time urinating x 4 d, ran out of his flomax, had ~800 cc urine in bladder - feels much better w/martinez in place, ua pt c/o decreased urinary stream and hard time urinating x 4 d, ran out of his flomax, had ~800 cc urine in bladder - feels much better w/martinez in place, ua neg, culture sent, will dc w/leg bag and rx for flomax, f/u w/pmd. pt understands and agrees w/plan, strict return precautions given

## 2021-02-08 NOTE — ED ADULT NURSE NOTE - OBJECTIVE STATEMENT
74 y/o male c/o urinary retention for the past 4 days and frequent bowel movements. Pt with hx of prostate CA in remission. Pt reports that he ran out of flomax.

## 2021-02-08 NOTE — ED ADULT NURSE NOTE - PMH
Adenoma of colon    Arthritis    DDD (degenerative disc disease), cervical    Diverticulosis    Dyspepsia    GERD (gastroesophageal reflux disease)    Prostate cancer    PVD (peripheral vascular disease)    Tendonitis    Vitamin B 12 deficiency

## 2021-02-08 NOTE — ED PROVIDER NOTE - ATTENDING CONTRIBUTION TO CARE
76 y/o M, with PMH of prostate cancer s/p treatment, on Flomax, but ran out of it, presents to ED c/o decreased urinary stream x 4 days. Pt states lower abd pressure, some discomfort when urinating. Denies fevers, chills, cp, sob, cough, n/v/d, flank pain. Pt AAO, NAD, (+)lower abd distension noted, with no TTP.   Martinez placed and 800 cc clear uop in martinez bag. UA neg, culture sent, will dc w/leg bag and rx for flomax, f/u w/pmd. pt understands and agrees w/plan, strict return precautions given.

## 2021-02-08 NOTE — ED PROVIDER NOTE - OBJECTIVE STATEMENT
The pt is a 74 y/o M, who presents to ED c/o decreased urinary stream x 4 d. Hx of prostate ca, ran out of flomax. Pt states lower abd pressure, some discomfort when urinating. Denies fevers, chills, cp, sob, cough, n/v/d, flank pain

## 2021-02-08 NOTE — ED PROVIDER NOTE - NSFOLLOWUPINSTRUCTIONS_ED_ALL_ED_FT
TAKE FLOMAX AS DISCUSSED, DAWKINS CATHETER SHOULD STAY IN PLACE FOR 3 DAYS, PLEASE FOLLOW UP WITH DR SIU  Urinary Retention in Men  WHAT YOU NEED TO KNOW:  Urinary retention is a condition that develops when your bladder does not empty completely when you urinate.  DISCHARGE INSTRUCTIONS:  Medicines:   •Medicines can help decrease the size of your prostate, fight infection, and help you urinate more easily.  •Take your medicine as directed. Contact your healthcare provider if you think your medicine is not helping or if you have side effects. Tell him or her if you are allergic to any medicine. Keep a list of the medicines, vitamins, and herbs you take. Include the amounts, and when and why you take them. Bring the list or the pill bottles to follow-up visits. Carry your medicine list with you in case of an emergency.  Dawkins catheter care: You may need a Dawkins catheter for up to 2 weeks at home. Healthcare providers will give you a smaller leg bag to collect urine. Keep the bag below your waist. This will prevent urine from flowing back into your bladder and causing an infection or other problems. Also, keep the tube free of kinks so the urine will drain properly. Do not pull on the catheter. This can cause pain and bleeding, and may cause the catheter to come out. Ask your healthcare provider or urologist for more information on Dawkins catheter care.    Urinate regularly: When your catheter is removed, do not let your bladder become too full before you urinate. Set regular times each day to urinate. Urinate as soon as you feel the need or at least every 3 hours while you are awake. Do not drink liquids before you go to bed. Urinate right before you go to bed.    Follow up with your healthcare provider or urologist as directed: Write down your questions so you remember to ask them during your visits.   Contact your healthcare provider or urologist if:   •You have a fever.  •You have pain when you urinate.  •You have blood in your urine.  •You have problems with your catheter.  •You have questions or concerns about your condition or care.  Return to the emergency department if:   •You have severe abdominal pain.  •You are breathing faster than usual    •Your heartbeat is faster than usual.      •Your face, hands, feet, or ankles are swollen.

## 2021-02-08 NOTE — ED PROVIDER NOTE - PATIENT PORTAL LINK FT
You can access the FollowMyHealth Patient Portal offered by [facility] by registering at the following website: [email or web address]. By joining [employer]’s FollowMyHealth portal, you will also be able to view your health information using other applications (apps) compatible with our system.

## 2021-02-08 NOTE — ED PROVIDER NOTE - DISCHARGE DATE
"Anesthesia Post Evaluation    Patient: Dennis Rees    Procedure(s) Performed: Procedure(s) (LRB):  PHACOEMULSIFICATION, CATARACT (Right)  INSERTION, INTRAOCULAR LENS PROSTHESIS (Right)    Final Anesthesia Type: MAC  Patient location during evaluation: PACU  Patient participation: Yes- Able to Participate  Level of consciousness: awake and alert and oriented  Post-procedure vital signs: reviewed and stable  Pain management: adequate  Airway patency: patent  PONV status at discharge: No PONV  Anesthetic complications: no      Cardiovascular status: blood pressure returned to baseline  Respiratory status: unassisted, room air and spontaneous ventilation  Hydration status: euvolemic  Follow-up not needed.        Visit Vitals  BP (!) 165/83   Pulse (!) 50   Temp 36.6 °C (97.9 °F) (Temporal)   Resp 16   Ht 5' 11" (1.803 m)   Wt 97.5 kg (215 lb)   SpO2 96%   BMI 29.99 kg/m²       Pain/Ashlie Score: Pain Assessment Performed: Yes (6/26/2018 12:42 PM)  Presence of Pain: denies (6/26/2018 12:42 PM)      " 08-Feb-2021

## 2021-02-08 NOTE — ED ADULT NURSE NOTE - FAMILY HISTORY
Child  Still living? Unknown  FH: myocardial infarction, Age at diagnosis: Age Unknown     Mother  Still living? Unknown  FH: Parkinson's disease, Age at diagnosis: Age Unknown

## 2021-02-09 LAB
CULTURE RESULTS: NO GROWTH — SIGNIFICANT CHANGE UP
SPECIMEN SOURCE: SIGNIFICANT CHANGE UP

## 2021-03-11 NOTE — ED ADULT TRIAGE NOTE - PAIN: PRESENCE, MLM
EEG Electrode Fix    Electrodes Fixed: Z    Supplies: Paste -  Signa Gel     Skin break down: No       chest/complains of pain/discomfort

## 2021-03-26 NOTE — ED PROVIDER NOTE - MUSCULOSKELETAL, MLM
Spine appears normal, range of motion is not limited, no muscle or joint tenderness Cimetidine Counseling:  I discussed with the patient the risks of Cimetidine including but not limited to gynecomastia, headache, diarrhea, nausea, drowsiness, arrhythmias, pancreatitis, skin rashes, psychosis, bone marrow suppression and kidney toxicity.

## 2021-07-16 ENCOUNTER — EMERGENCY (EMERGENCY)
Facility: HOSPITAL | Age: 76
LOS: 1 days | Discharge: HOME | End: 2021-07-16
Attending: EMERGENCY MEDICINE | Admitting: EMERGENCY MEDICINE
Payer: MEDICARE

## 2021-07-16 VITALS
OXYGEN SATURATION: 98 % | TEMPERATURE: 98 F | DIASTOLIC BLOOD PRESSURE: 92 MMHG | HEART RATE: 77 BPM | WEIGHT: 123.9 LBS | SYSTOLIC BLOOD PRESSURE: 155 MMHG | RESPIRATION RATE: 17 BRPM | HEIGHT: 67 IN

## 2021-07-16 DIAGNOSIS — Z96.659 PRESENCE OF UNSPECIFIED ARTIFICIAL KNEE JOINT: ICD-10-CM

## 2021-07-16 DIAGNOSIS — Z96.659 PRESENCE OF UNSPECIFIED ARTIFICIAL KNEE JOINT: Chronic | ICD-10-CM

## 2021-07-16 DIAGNOSIS — K57.90 DIVERTICULOSIS OF INTESTINE, PART UNSPECIFIED, WITHOUT PERFORATION OR ABSCESS WITHOUT BLEEDING: ICD-10-CM
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
## 2021-07-16 DIAGNOSIS — T83.031A LEAKAGE OF INDWELLING URETHRAL CATHETER, INITIAL ENCOUNTER: ICD-10-CM

## 2021-07-16 DIAGNOSIS — Z87.19 PERSONAL HISTORY OF OTHER DISEASES OF THE DIGESTIVE SYSTEM: ICD-10-CM

## 2021-07-16 DIAGNOSIS — N30.00 ACUTE CYSTITIS WITHOUT HEMATURIA: ICD-10-CM

## 2021-07-16 DIAGNOSIS — D12.6 BENIGN NEOPLASM OF COLON, UNSPECIFIED: ICD-10-CM

## 2021-07-16 DIAGNOSIS — R10.30 LOWER ABDOMINAL PAIN, UNSPECIFIED: ICD-10-CM

## 2021-07-16 DIAGNOSIS — Z79.899 OTHER LONG TERM (CURRENT) DRUG THERAPY: ICD-10-CM

## 2021-07-16 DIAGNOSIS — Z86.2 PERSONAL HISTORY OF DISEASES OF THE BLOOD AND BLOOD-FORMING ORGANS AND CERTAIN DISORDERS INVOLVING THE IMMUNE MECHANISM: ICD-10-CM

## 2021-07-16 DIAGNOSIS — Z87.39 PERSONAL HISTORY OF OTHER DISEASES OF THE MUSCULOSKELETAL SYSTEM AND CONNECTIVE TISSUE: ICD-10-CM

## 2021-07-16 DIAGNOSIS — M19.90 UNSPECIFIED OSTEOARTHRITIS, UNSPECIFIED SITE: ICD-10-CM
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
## 2021-07-16 DIAGNOSIS — Z86.79 PERSONAL HISTORY OF OTHER DISEASES OF THE CIRCULATORY SYSTEM: ICD-10-CM

## 2021-07-16 DIAGNOSIS — C61 MALIGNANT NEOPLASM OF PROSTATE: ICD-10-CM

## 2021-07-16 LAB
ANION GAP SERPL CALC-SCNC: 14 MMOL/L — SIGNIFICANT CHANGE UP (ref 5–17)
APPEARANCE UR: ABNORMAL
BACTERIA # UR AUTO: ABNORMAL /HPF
BASOPHILS # BLD AUTO: 0.04 K/UL — SIGNIFICANT CHANGE UP (ref 0–0.2)
BASOPHILS NFR BLD AUTO: 0.5 % — SIGNIFICANT CHANGE UP (ref 0–2)
BILIRUB UR-MCNC: NEGATIVE — SIGNIFICANT CHANGE UP
BUN SERPL-MCNC: 13 MG/DL — SIGNIFICANT CHANGE UP (ref 7–23)
CALCIUM SERPL-MCNC: 9.7 MG/DL — SIGNIFICANT CHANGE UP (ref 8.4–10.5)
CHLORIDE SERPL-SCNC: 101 MMOL/L — SIGNIFICANT CHANGE UP (ref 96–108)
CO2 SERPL-SCNC: 25 MMOL/L — SIGNIFICANT CHANGE UP (ref 22–31)
COLOR SPEC: YELLOW — SIGNIFICANT CHANGE UP
COMMENT - URINE: SIGNIFICANT CHANGE UP
CREAT SERPL-MCNC: 0.8 MG/DL — SIGNIFICANT CHANGE UP (ref 0.5–1.3)
DIFF PNL FLD: ABNORMAL
EOSINOPHIL # BLD AUTO: 0.52 K/UL — HIGH (ref 0–0.5)
EOSINOPHIL NFR BLD AUTO: 6.2 % — HIGH (ref 0–6)
EPI CELLS # UR: SIGNIFICANT CHANGE UP /HPF (ref 0–5)
GLUCOSE SERPL-MCNC: 121 MG/DL — HIGH (ref 70–99)
GLUCOSE UR QL: NEGATIVE — SIGNIFICANT CHANGE UP
HCT VFR BLD CALC: 44.4 % — SIGNIFICANT CHANGE UP (ref 39–50)
HGB BLD-MCNC: 14.4 G/DL — SIGNIFICANT CHANGE UP (ref 13–17)
IMM GRANULOCYTES NFR BLD AUTO: 0.5 % — SIGNIFICANT CHANGE UP (ref 0–1.5)
KETONES UR-MCNC: NEGATIVE — SIGNIFICANT CHANGE UP
LEUKOCYTE ESTERASE UR-ACNC: ABNORMAL
LYMPHOCYTES # BLD AUTO: 1.62 K/UL — SIGNIFICANT CHANGE UP (ref 1–3.3)
LYMPHOCYTES # BLD AUTO: 19.3 % — SIGNIFICANT CHANGE UP (ref 13–44)
MCHC RBC-ENTMCNC: 32.1 PG — SIGNIFICANT CHANGE UP (ref 27–34)
MCHC RBC-ENTMCNC: 32.4 GM/DL — SIGNIFICANT CHANGE UP (ref 32–36)
MCV RBC AUTO: 98.9 FL — SIGNIFICANT CHANGE UP (ref 80–100)
MONOCYTES # BLD AUTO: 0.83 K/UL — SIGNIFICANT CHANGE UP (ref 0–0.9)
MONOCYTES NFR BLD AUTO: 9.9 % — SIGNIFICANT CHANGE UP (ref 2–14)
NEUTROPHILS # BLD AUTO: 5.34 K/UL — SIGNIFICANT CHANGE UP (ref 1.8–7.4)
NEUTROPHILS NFR BLD AUTO: 63.6 % — SIGNIFICANT CHANGE UP (ref 43–77)
NITRITE UR-MCNC: POSITIVE
NRBC # BLD: 0 /100 WBCS — SIGNIFICANT CHANGE UP (ref 0–0)
PH UR: 7.5 — SIGNIFICANT CHANGE UP (ref 5–8)
PLATELET # BLD AUTO: 306 K/UL — SIGNIFICANT CHANGE UP (ref 150–400)
POTASSIUM SERPL-MCNC: SIGNIFICANT CHANGE UP MMOL/L (ref 3.5–5.3)
POTASSIUM SERPL-SCNC: SIGNIFICANT CHANGE UP MMOL/L (ref 3.5–5.3)
PROT UR-MCNC: 30 MG/DL
RBC # BLD: 4.49 M/UL — SIGNIFICANT CHANGE UP (ref 4.2–5.8)
RBC # FLD: 14.3 % — SIGNIFICANT CHANGE UP (ref 10.3–14.5)
RBC CASTS # UR COMP ASSIST: ABNORMAL /HPF
SODIUM SERPL-SCNC: 140 MMOL/L — SIGNIFICANT CHANGE UP (ref 135–145)
SP GR SPEC: 1.02 — SIGNIFICANT CHANGE UP (ref 1–1.03)
UROBILINOGEN FLD QL: 0.2 E.U./DL — SIGNIFICANT CHANGE UP
WBC # BLD: 8.39 K/UL — SIGNIFICANT CHANGE UP (ref 3.8–10.5)
WBC # FLD AUTO: 8.39 K/UL — SIGNIFICANT CHANGE UP (ref 3.8–10.5)
WBC UR QL: ABNORMAL /HPF

## 2021-07-16 PROCEDURE — 96374 THER/PROPH/DIAG INJ IV PUSH: CPT | Mod: XU

## 2021-07-16 PROCEDURE — 85025 COMPLETE CBC W/AUTO DIFF WBC: CPT

## 2021-07-16 PROCEDURE — 87086 URINE CULTURE/COLONY COUNT: CPT

## 2021-07-16 PROCEDURE — 80048 BASIC METABOLIC PNL TOTAL CA: CPT

## 2021-07-16 PROCEDURE — 99284 EMERGENCY DEPT VISIT MOD MDM: CPT

## 2021-07-16 PROCEDURE — 51702 INSERT TEMP BLADDER CATH: CPT

## 2021-07-16 PROCEDURE — 99284 EMERGENCY DEPT VISIT MOD MDM: CPT | Mod: 25

## 2021-07-16 PROCEDURE — 36415 COLL VENOUS BLD VENIPUNCTURE: CPT

## 2021-07-16 PROCEDURE — 81001 URINALYSIS AUTO W/SCOPE: CPT

## 2021-07-16 RX ORDER — CEFDINIR 250 MG/5ML
1 POWDER, FOR SUSPENSION ORAL
Qty: 14 | Refills: 0
Start: 2021-07-16 | End: 2021-07-22

## 2021-07-16 RX ORDER — CEFTRIAXONE 500 MG/1
1000 INJECTION, POWDER, FOR SOLUTION INTRAMUSCULAR; INTRAVENOUS ONCE
Refills: 0 | Status: COMPLETED | OUTPATIENT
Start: 2021-07-16 | End: 2021-07-16

## 2021-07-16 RX ADMIN — CEFTRIAXONE 100 MILLIGRAM(S): 500 INJECTION, POWDER, FOR SOLUTION INTRAMUSCULAR; INTRAVENOUS at 11:49

## 2021-07-16 NOTE — ED PROVIDER NOTE - PROGRESS NOTE DETAILS
feeling better.  martinez replaced.  no leak.  urine anomalies noted.  plan dc with oral abx and fu urology.

## 2021-07-16 NOTE — ED ADULT NURSE REASSESSMENT NOTE - NS ED NURSE REASSESS COMMENT FT1
Leg Bag in place PTA was discontinued and replaced with Sz 16 Fr Dawkins Catheter by HARRISON Lang.  Urine voided after catheter replacement was noted with cloudy appearance.  UA/ UC specimens obtained and off to lab.  Will continue to monitor.

## 2021-07-16 NOTE — ED ADULT NURSE NOTE - NSIMPLEMENTINTERV_GEN_ALL_ED
Implemented All Fall Risk Interventions:  [place] to call system. Call bell, personal items and telephone within reach. Instruct patient to call for assistance. Room bathroom lighting operational. Non-slip footwear when patient is off stretcher. Physically safe environment: no spills, clutter or unnecessary equipment. Stretcher in lowest position, wheels locked, appropriate side rails in place. Provide visual cue, wrist band, yellow gown, etc. Monitor gait and stability. Monitor for mental status changes and reorient to person, place, and time. Review medications for side effects contributing to fall risk. Reinforce activity limits and safety measures with patient and family.

## 2021-07-16 NOTE — ED PROVIDER NOTE - NSFOLLOWUPINSTRUCTIONS_ED_ALL_ED_FT
Urinary Tract Infection    A urinary tract infection (UTI) is an infection of any part of the urinary tract, which includes the kidneys, ureters, bladder, and urethra. Risk factors include ignoring your need to urinate, wiping back to front if female, being an uncircumcised male, and having diabetes or a weak immune system. Symptoms include frequent urination, pain or burning with urination, foul smelling urine, cloudy urine, pain in the lower abdomen, blood in the urine, and fever. If you were prescribed an antibiotic medicine, take it as told by your health care provider. Do not stop taking the antibiotic even if you start to feel better.    SEEK IMMEDIATE MEDICAL CARE IF YOU HAVE ANY OF THE FOLLOWING SYMPTOMS: severe back or abdominal pain, fever, inability to keep fluids or medicine down, dizziness/lightheadedness, or a change in mental status.     FOLLOW UP WITH YOUR UROLOGIST NEXT WEEK.

## 2021-07-16 NOTE — ED PROVIDER NOTE - PATIENT PORTAL LINK FT
You can access the FollowMyHealth Patient Portal offered by [facility] by registering at the following website: [email or web address]. By joining [employer]’s FollowMyHealth portal, you will also be able to view your health information using other applications (apps) compatible with our system.

## 2021-07-16 NOTE — ED ADULT NURSE NOTE - CHPI ED NUR SYMPTOMS POS
+ diffuse abd pain and bloating x4 days, progressively worsening. abdomen diffusely TTP. martinez catheter in place w/ clear/yellow output/NAUSEA/PAIN

## 2021-07-16 NOTE — ED PROVIDER NOTE - OBJECTIVE STATEMENT
74 y/o M,  used ([place], [id number]) w/ Hx of prostate cancer, HTN, arthritis, presents to ED w c/o of suprapubic region pain, burning and frequency in urination, since 4 days ago. Pt states burning sensation w. urination and drainage around Dawkins. Pt reports he had Dawkins inserted 4 months ago. Urologist (Dr. Gilberto Bliss, [facility], [phone]) changed it 2 months ago and 4 days ago. Denies hematuria, fevers or chills, back pain. Denies taking med for pain.

## 2021-07-16 NOTE — ED PROVIDER NOTE - CLINICAL SUMMARY MEDICAL DECISION MAKING FREE TEXT BOX
76 y/o M w/ Hx of prostate cancer, chronic urinary retention s/p Dawkins (changed 4 days ago) presents to ED w/ increased pain in suprapubic region and occasional leaking around Dawkins. Vitals stable and looks well. Suprapubic tenderness present. No CVAT. Do not suspect surgical abd. Concerned most for UTI, Dawkins complication. Doubt bladder perforation. Plan is UA, urine culture, basic labs, and replaced Dawkins.

## 2021-07-16 NOTE — ED PROVIDER NOTE - CARE PLAN
Principal Discharge DX:	Acute cystitis without hematuria  Secondary Diagnosis:	Dawkins catheter problem, initial encounter

## 2021-07-16 NOTE — ED ADULT TRIAGE NOTE - CHIEF COMPLAINT QUOTE
Patient c/o abdominal pain , bloatedness , nausea, burning and frequency in urination even with martinez catheter since monday . Pt. reported he had martinez inserted 4mos ago and has changed it 2mos ago . History of Prostate ca. Denies any hematuria , fever nor chills .

## 2021-07-16 NOTE — ED ADULT NURSE NOTE - OBJECTIVE STATEMENT
74 y/o male w/ PMH GERD, PVD, diverticulosis, adenoma of colon, Prostate CA w/ indwelling urinary catheter presents to ED for c/o diffuse abd pain x4 days. Per pt, catheter x4 months, changed at 2 month ayaka and again changed on 7/12. Since placement of new catheter, pt endorses progressively worsening diffuse abd pain and distention, +TTP. denies change in UO, but does endorse some leaking near his urethra. Mild nausea. Pt denies fevers, chills, vomiting, constipation.

## 2021-07-16 NOTE — ED PROVIDER NOTE - GENITOURINARY, MLM
Suprapubic tenderness. Dawkins in place, appears normal, draining, clear yellow urine w/o complications.

## 2021-07-17 LAB
CULTURE RESULTS: SIGNIFICANT CHANGE UP
SPECIMEN SOURCE: SIGNIFICANT CHANGE UP

## 2021-12-07 ENCOUNTER — EMERGENCY (EMERGENCY)
Facility: HOSPITAL | Age: 76
LOS: 1 days | Discharge: HOME | End: 2021-12-07
Attending: STUDENT IN AN ORGANIZED HEALTH CARE EDUCATION/TRAINING PROGRAM | Admitting: STUDENT IN AN ORGANIZED HEALTH CARE EDUCATION/TRAINING PROGRAM
Payer: MEDICARE

## 2021-12-07 VITALS
RESPIRATION RATE: 18 BRPM | OXYGEN SATURATION: 97 % | SYSTOLIC BLOOD PRESSURE: 132 MMHG | TEMPERATURE: 98 F | DIASTOLIC BLOOD PRESSURE: 84 MMHG | HEART RATE: 56 BPM

## 2021-12-07 VITALS
HEIGHT: 67 IN | WEIGHT: 121.03 LBS | TEMPERATURE: 98 F | SYSTOLIC BLOOD PRESSURE: 130 MMHG | RESPIRATION RATE: 18 BRPM | DIASTOLIC BLOOD PRESSURE: 82 MMHG | HEART RATE: 70 BPM | OXYGEN SATURATION: 100 %

## 2021-12-07 DIAGNOSIS — R10.30 LOWER ABDOMINAL PAIN, UNSPECIFIED: ICD-10-CM

## 2021-12-07 DIAGNOSIS — N39.0 URINARY TRACT INFECTION, SITE NOT SPECIFIED: ICD-10-CM

## 2021-12-07 DIAGNOSIS — Z96.659 PRESENCE OF UNSPECIFIED ARTIFICIAL KNEE JOINT: Chronic | ICD-10-CM

## 2021-12-07 DIAGNOSIS — I10 ESSENTIAL (PRIMARY) HYPERTENSION: ICD-10-CM

## 2021-12-07 DIAGNOSIS — R11.10 VOMITING, UNSPECIFIED: ICD-10-CM

## 2021-12-07 DIAGNOSIS — K21.9 GASTRO-ESOPHAGEAL REFLUX DISEASE WITHOUT ESOPHAGITIS: ICD-10-CM

## 2021-12-07 LAB
ALBUMIN SERPL ELPH-MCNC: 3.6 G/DL — SIGNIFICANT CHANGE UP (ref 3.3–5)
ALBUMIN SERPL ELPH-MCNC: 4 G/DL — SIGNIFICANT CHANGE UP (ref 3.3–5)
ALP SERPL-CCNC: 121 U/L — HIGH (ref 40–120)
ALP SERPL-CCNC: 129 U/L — HIGH (ref 40–120)
ALT FLD-CCNC: 11 U/L — SIGNIFICANT CHANGE UP (ref 10–45)
ALT FLD-CCNC: SIGNIFICANT CHANGE UP (ref 10–45)
ANION GAP SERPL CALC-SCNC: 5 MMOL/L — SIGNIFICANT CHANGE UP (ref 5–17)
APPEARANCE UR: CLEAR — SIGNIFICANT CHANGE UP
APPEARANCE UR: CLEAR — SIGNIFICANT CHANGE UP
AST SERPL-CCNC: 16 U/L — SIGNIFICANT CHANGE UP (ref 10–40)
AST SERPL-CCNC: SIGNIFICANT CHANGE UP (ref 10–40)
BACTERIA # UR AUTO: ABNORMAL /HPF
BACTERIA # UR AUTO: PRESENT /HPF
BASOPHILS # BLD AUTO: 0.03 K/UL — SIGNIFICANT CHANGE UP (ref 0–0.2)
BASOPHILS NFR BLD AUTO: 0.5 % — SIGNIFICANT CHANGE UP (ref 0–2)
BILIRUB DIRECT SERPL-MCNC: 0.2 MG/DL — SIGNIFICANT CHANGE UP (ref 0–0.3)
BILIRUB INDIRECT FLD-MCNC: 0.1 MG/DL — LOW (ref 0.2–1)
BILIRUB SERPL-MCNC: 0.3 MG/DL — SIGNIFICANT CHANGE UP (ref 0.2–1.2)
BILIRUB SERPL-MCNC: 0.3 MG/DL — SIGNIFICANT CHANGE UP (ref 0.2–1.2)
BILIRUB UR-MCNC: NEGATIVE — SIGNIFICANT CHANGE UP
BILIRUB UR-MCNC: NEGATIVE — SIGNIFICANT CHANGE UP
BUN SERPL-MCNC: 11 MG/DL — SIGNIFICANT CHANGE UP (ref 7–23)
CALCIUM SERPL-MCNC: 9.2 MG/DL — SIGNIFICANT CHANGE UP (ref 8.4–10.5)
CHLORIDE SERPL-SCNC: 102 MMOL/L — SIGNIFICANT CHANGE UP (ref 96–108)
CO2 SERPL-SCNC: 29 MMOL/L — SIGNIFICANT CHANGE UP (ref 22–31)
COLOR SPEC: YELLOW — SIGNIFICANT CHANGE UP
COLOR SPEC: YELLOW — SIGNIFICANT CHANGE UP
COMMENT - URINE: SIGNIFICANT CHANGE UP
CREAT SERPL-MCNC: 0.85 MG/DL — SIGNIFICANT CHANGE UP (ref 0.5–1.3)
DIFF PNL FLD: ABNORMAL
DIFF PNL FLD: ABNORMAL
EOSINOPHIL # BLD AUTO: 0.26 K/UL — SIGNIFICANT CHANGE UP (ref 0–0.5)
EOSINOPHIL NFR BLD AUTO: 4.7 % — SIGNIFICANT CHANGE UP (ref 0–6)
EPI CELLS # UR: SIGNIFICANT CHANGE UP /HPF (ref 0–5)
EPI CELLS # UR: SIGNIFICANT CHANGE UP /HPF (ref 0–5)
GLUCOSE SERPL-MCNC: 101 MG/DL — HIGH (ref 70–99)
GLUCOSE UR QL: NEGATIVE — SIGNIFICANT CHANGE UP
GLUCOSE UR QL: NEGATIVE — SIGNIFICANT CHANGE UP
HCT VFR BLD CALC: 42.6 % — SIGNIFICANT CHANGE UP (ref 39–50)
HGB BLD-MCNC: 13.4 G/DL — SIGNIFICANT CHANGE UP (ref 13–17)
IMM GRANULOCYTES NFR BLD AUTO: 0.4 % — SIGNIFICANT CHANGE UP (ref 0–1.5)
KETONES UR-MCNC: NEGATIVE — SIGNIFICANT CHANGE UP
KETONES UR-MCNC: NEGATIVE — SIGNIFICANT CHANGE UP
LACTATE SERPL-SCNC: 1 MMOL/L — SIGNIFICANT CHANGE UP (ref 0.5–2)
LEUKOCYTE ESTERASE UR-ACNC: ABNORMAL
LEUKOCYTE ESTERASE UR-ACNC: ABNORMAL
LIDOCAIN IGE QN: 16 U/L — SIGNIFICANT CHANGE UP (ref 7–60)
LYMPHOCYTES # BLD AUTO: 1.4 K/UL — SIGNIFICANT CHANGE UP (ref 1–3.3)
LYMPHOCYTES # BLD AUTO: 25.5 % — SIGNIFICANT CHANGE UP (ref 13–44)
MCHC RBC-ENTMCNC: 31.2 PG — SIGNIFICANT CHANGE UP (ref 27–34)
MCHC RBC-ENTMCNC: 31.5 GM/DL — LOW (ref 32–36)
MCV RBC AUTO: 99.3 FL — SIGNIFICANT CHANGE UP (ref 80–100)
MONOCYTES # BLD AUTO: 0.5 K/UL — SIGNIFICANT CHANGE UP (ref 0–0.9)
MONOCYTES NFR BLD AUTO: 9.1 % — SIGNIFICANT CHANGE UP (ref 2–14)
NEUTROPHILS # BLD AUTO: 3.27 K/UL — SIGNIFICANT CHANGE UP (ref 1.8–7.4)
NEUTROPHILS NFR BLD AUTO: 59.8 % — SIGNIFICANT CHANGE UP (ref 43–77)
NITRITE UR-MCNC: POSITIVE
NITRITE UR-MCNC: POSITIVE
NRBC # BLD: 0 /100 WBCS — SIGNIFICANT CHANGE UP (ref 0–0)
PH UR: 6.5 — SIGNIFICANT CHANGE UP (ref 5–8)
PH UR: 7 — SIGNIFICANT CHANGE UP (ref 5–8)
PLATELET # BLD AUTO: 315 K/UL — SIGNIFICANT CHANGE UP (ref 150–400)
POTASSIUM SERPL-MCNC: SIGNIFICANT CHANGE UP (ref 3.5–5.3)
POTASSIUM SERPL-SCNC: SIGNIFICANT CHANGE UP (ref 3.5–5.3)
PROT SERPL-MCNC: 6.9 G/DL — SIGNIFICANT CHANGE UP (ref 6–8.3)
PROT SERPL-MCNC: 7 G/DL — SIGNIFICANT CHANGE UP (ref 6–8.3)
PROT UR-MCNC: NEGATIVE MG/DL — SIGNIFICANT CHANGE UP
PROT UR-MCNC: NEGATIVE MG/DL — SIGNIFICANT CHANGE UP
RBC # BLD: 4.29 M/UL — SIGNIFICANT CHANGE UP (ref 4.2–5.8)
RBC # FLD: 14.6 % — HIGH (ref 10.3–14.5)
RBC CASTS # UR COMP ASSIST: < 5 /HPF — SIGNIFICANT CHANGE UP
RBC CASTS # UR COMP ASSIST: < 5 /HPF — SIGNIFICANT CHANGE UP
SODIUM SERPL-SCNC: 136 MMOL/L — SIGNIFICANT CHANGE UP (ref 135–145)
SP GR SPEC: 1.02 — SIGNIFICANT CHANGE UP (ref 1–1.03)
SP GR SPEC: <=1.005 — SIGNIFICANT CHANGE UP (ref 1–1.03)
UROBILINOGEN FLD QL: 0.2 E.U./DL — SIGNIFICANT CHANGE UP
UROBILINOGEN FLD QL: 0.2 E.U./DL — SIGNIFICANT CHANGE UP
WBC # BLD: 5.48 K/UL — SIGNIFICANT CHANGE UP (ref 3.8–10.5)
WBC # FLD AUTO: 5.48 K/UL — SIGNIFICANT CHANGE UP (ref 3.8–10.5)
WBC UR QL: > 10 /HPF
WBC UR QL: ABNORMAL /HPF

## 2021-12-07 PROCEDURE — G1004: CPT

## 2021-12-07 PROCEDURE — 74177 CT ABD & PELVIS W/CONTRAST: CPT | Mod: MG

## 2021-12-07 PROCEDURE — 96374 THER/PROPH/DIAG INJ IV PUSH: CPT | Mod: XU

## 2021-12-07 PROCEDURE — 74177 CT ABD & PELVIS W/CONTRAST: CPT | Mod: 26,MG

## 2021-12-07 PROCEDURE — 83605 ASSAY OF LACTIC ACID: CPT

## 2021-12-07 PROCEDURE — 83690 ASSAY OF LIPASE: CPT

## 2021-12-07 PROCEDURE — 80053 COMPREHEN METABOLIC PANEL: CPT

## 2021-12-07 PROCEDURE — 99284 EMERGENCY DEPT VISIT MOD MDM: CPT | Mod: 25

## 2021-12-07 PROCEDURE — 87186 SC STD MICRODIL/AGAR DIL: CPT

## 2021-12-07 PROCEDURE — 96375 TX/PRO/DX INJ NEW DRUG ADDON: CPT | Mod: XU

## 2021-12-07 PROCEDURE — 85025 COMPLETE CBC W/AUTO DIFF WBC: CPT

## 2021-12-07 PROCEDURE — 81001 URINALYSIS AUTO W/SCOPE: CPT

## 2021-12-07 PROCEDURE — 87086 URINE CULTURE/COLONY COUNT: CPT

## 2021-12-07 PROCEDURE — 80076 HEPATIC FUNCTION PANEL: CPT

## 2021-12-07 PROCEDURE — 36415 COLL VENOUS BLD VENIPUNCTURE: CPT

## 2021-12-07 PROCEDURE — 99285 EMERGENCY DEPT VISIT HI MDM: CPT

## 2021-12-07 RX ORDER — SODIUM CHLORIDE 9 MG/ML
1000 INJECTION INTRAMUSCULAR; INTRAVENOUS; SUBCUTANEOUS ONCE
Refills: 0 | Status: COMPLETED | OUTPATIENT
Start: 2021-12-07 | End: 2021-12-07

## 2021-12-07 RX ORDER — ONDANSETRON 8 MG/1
4 TABLET, FILM COATED ORAL ONCE
Refills: 0 | Status: COMPLETED | OUTPATIENT
Start: 2021-12-07 | End: 2021-12-07

## 2021-12-07 RX ORDER — CEFPODOXIME PROXETIL 100 MG
1 TABLET ORAL
Qty: 20 | Refills: 0
Start: 2021-12-07 | End: 2021-12-16

## 2021-12-07 RX ORDER — IOHEXOL 300 MG/ML
30 INJECTION, SOLUTION INTRAVENOUS ONCE
Refills: 0 | Status: COMPLETED | OUTPATIENT
Start: 2021-12-07 | End: 2021-12-07

## 2021-12-07 RX ORDER — MORPHINE SULFATE 50 MG/1
4 CAPSULE, EXTENDED RELEASE ORAL ONCE
Refills: 0 | Status: DISCONTINUED | OUTPATIENT
Start: 2021-12-07 | End: 2021-12-07

## 2021-12-07 RX ORDER — CEFPODOXIME PROXETIL 100 MG
200 TABLET ORAL ONCE
Refills: 0 | Status: COMPLETED | OUTPATIENT
Start: 2021-12-07 | End: 2021-12-07

## 2021-12-07 RX ADMIN — Medication 200 MILLIGRAM(S): at 17:26

## 2021-12-07 RX ADMIN — IOHEXOL 30 MILLILITER(S): 300 INJECTION, SOLUTION INTRAVENOUS at 12:54

## 2021-12-07 RX ADMIN — ONDANSETRON 4 MILLIGRAM(S): 8 TABLET, FILM COATED ORAL at 12:55

## 2021-12-07 RX ADMIN — SODIUM CHLORIDE 1000 MILLILITER(S): 9 INJECTION INTRAMUSCULAR; INTRAVENOUS; SUBCUTANEOUS at 12:54

## 2021-12-07 RX ADMIN — MORPHINE SULFATE 4 MILLIGRAM(S): 50 CAPSULE, EXTENDED RELEASE ORAL at 12:55

## 2021-12-07 NOTE — ED PROVIDER NOTE - PROGRESS NOTE DETAILS
ushah: 76m with h/o prostate ca in remission, htn p/w abdominal pain x 1 day, l> r, n and vomiting x 1. no f/constipation/blood in stool.  +chronic martinez - missed apt for martinez x-change yesterday, on abx- unsure name.  no urinary retention. no hematuria;    nad  airway patent  +S1S2 no mrg  CTA b/l  soft ventral hernia, llq> rlq ttp, no cva ttp;  martinez with yellow urine    a/p: r/o infection, x-change cath, ct, re-assess

## 2021-12-07 NOTE — ED PROVIDER NOTE - NSICDXFAMILYHX_GEN_ALL_CORE_FT
FAMILY HISTORY:  Mother  Still living? Unknown  FH: Parkinson's disease, Age at diagnosis: Age Unknown    Child  Still living? Unknown  FH: myocardial infarction, Age at diagnosis: Age Unknown

## 2021-12-07 NOTE — ED PROVIDER NOTE - CARE PLAN
1 Principal Discharge DX:	Abdominal pain   Principal Discharge DX:	Abdominal pain  Secondary Diagnosis:	Urinary tract infection

## 2021-12-07 NOTE — ED PROVIDER NOTE - NS ED ROS FT
Constitutional: No fever. No chills.  Eyes: No redness. No discharge. No vision change.   ENT: No sore throat. No ear pain.  Cardiovascular: No chest pain. No leg swelling.  Respiratory: No cough. No shortness of breath.  GI: +abdominal pain. +vomiting. No diarrhea.   : +indwelling martinez. +dysuria. No hematuria.   MSK: No joint pain. No back pain.   Skin: No rash. No abrasions.   Neuro: No numbness. No weakness.   Psych: No known mental health issues.

## 2021-12-07 NOTE — ED PROVIDER NOTE - PATIENT PORTAL LINK FT
You can access the FollowMyHealth Patient Portal offered by [facility] by registering at the following website: [email or web address]. By joining [employer]’s FollowMyHealth portal, you will also be able to view your health information using other applications (apps) compatible with our system.

## 2021-12-07 NOTE — ED PROVIDER NOTE - CLINICAL SUMMARY MEDICAL DECISION MAKING FREE TEXT BOX
Pt afebrile and hemodynamically stable. Mild lower abdominal tenderness on exam. No CVA tenderness. Good urine output through martinez. CBC, CMP unremarkable. CTAP notable for: No hydronephrosis. Martinez catheter in situ in a thick-walled urinary bladder. Probable UTI/cystitis. Prostate not enlarged. No evidence of small bowel obstruction. Normal appendix. Suspect rectal hemorrhoids. Recommend clinical correlation. Initial UA taken from old catheter. UA repeated after catheter exchanged and nitrite positive. Urine culture pending. confirmed with pt's pharmacy pt currently on bactrim ds since 11/8. will change to cefpodoxime as pt continues to be symptomatic with +UA. Pt states he will call urologist tomorrow for follow up. Return precautions given.

## 2021-12-07 NOTE — ED PROVIDER NOTE - NSFOLLOWUPINSTRUCTIONS_ED_ALL_ED_FT
Your labs today were reassuring. Your CT abdomen pelvis showed the following:  No hydronephrosis. Martinez catheter in situ in a thick-walled urinary bladder. Probable UTI/cystitis. Prostate not enlarged. No evidence of small bowel obstruction. Normal appendix. Suspect rectal hemorrhoids.     Stop taking bactrim ds. START one tab of cefpodoxime twice daily for 10 days.    Your catheter was exchanged today 12/7. Please call your urologist to schedule follow up appointment.    Return to the Emergency Department if you develop fever>100.4F, worsening abdominal pain, decreased urine output through martinez, or any other concerns.

## 2021-12-07 NOTE — ED ADULT TRIAGE NOTE - BSA (M2)
· BP stable and well controlled  · Continue Benazepril  · Reviewed recent BMP done 2/4/2020, stable renal function and electrolytes  · Follow up in 4 months  1.63

## 2021-12-07 NOTE — ED PROVIDER NOTE - OBJECTIVE STATEMENT
75yo male with pmhx of prostate ca in remission, HTN, indwelling martinez catheter x3 mo for chronic UTI (on antibiotics, unsure of name) presents with lower abdominal pain x1 day associated with nausea and one episode of NBNB emesis. He denies fever, chills, chest pain, shortness of breath, diarrhea, constipation. Pt also reports dysuria through the martinez, denies change in output or hematuria. States he was supposed to have martinez exchanged yesterday but missed appointment. He denies h/o prior abdominal surgeries.

## 2021-12-07 NOTE — ED ADULT TRIAGE NOTE - CHIEF COMPLAINT QUOTE
Pt presents to ED c/o diffuse abdominal pain x 2 days, worsening in last couple of months. Reports nausea, no vomiting. Denies chest pain, fevers, sob.

## 2021-12-07 NOTE — ED ADULT NURSE NOTE - OBJECTIVE STATEMENT
Patient presents to ED c/o abdominal pain for 3-4 years worsening over the last couple of months accompanied by nausea. Patient denies vomiting, CP, fever, or SOB. PMH PVD, DDD, GERD, Diverticulosis. NKA. Patient fully vaccinated against COVID.

## 2021-12-07 NOTE — ED PROVIDER NOTE - NSICDXPASTMEDICALHX_GEN_ALL_CORE_FT
PAST MEDICAL HISTORY:  Adenoma of colon     Arthritis     DDD (degenerative disc disease), cervical     Diverticulosis     Dyspepsia     GERD (gastroesophageal reflux disease)     Prostate cancer     PVD (peripheral vascular disease)     Tendonitis     Vitamin B 12 deficiency

## 2021-12-08 LAB
CULTURE RESULTS: SIGNIFICANT CHANGE UP
SPECIMEN SOURCE: SIGNIFICANT CHANGE UP

## 2021-12-09 LAB
-  AMPICILLIN/SULBACTAM: SIGNIFICANT CHANGE UP
-  AMPICILLIN: SIGNIFICANT CHANGE UP
-  CEFAZOLIN: SIGNIFICANT CHANGE UP
-  CEFTRIAXONE: SIGNIFICANT CHANGE UP
-  CIPROFLOXACIN: SIGNIFICANT CHANGE UP
-  ERTAPENEM: SIGNIFICANT CHANGE UP
-  GENTAMICIN: SIGNIFICANT CHANGE UP
-  NITROFURANTOIN: SIGNIFICANT CHANGE UP
-  PIPERACILLIN/TAZOBACTAM: SIGNIFICANT CHANGE UP
-  TOBRAMYCIN: SIGNIFICANT CHANGE UP
-  TRIMETHOPRIM/SULFAMETHOXAZOLE: SIGNIFICANT CHANGE UP
CULTURE RESULTS: SIGNIFICANT CHANGE UP
METHOD TYPE: SIGNIFICANT CHANGE UP
ORGANISM # SPEC MICROSCOPIC CNT: SIGNIFICANT CHANGE UP
ORGANISM # SPEC MICROSCOPIC CNT: SIGNIFICANT CHANGE UP
SPECIMEN SOURCE: SIGNIFICANT CHANGE UP

## 2021-12-15 NOTE — ED ADULT NURSE NOTE - SUICIDE SCREENING QUESTION 3
Dr. Boudreaux,    Please see below and advise.    Thanks,  RAVEN Branch  [facility]      No

## 2022-02-10 ENCOUNTER — EMERGENCY (EMERGENCY)
Facility: HOSPITAL | Age: 77
LOS: 1 days | Discharge: HOME | End: 2022-02-10
Attending: EMERGENCY MEDICINE | Admitting: EMERGENCY MEDICINE
Payer: MEDICARE

## 2022-02-10 VITALS
TEMPERATURE: 98 F | RESPIRATION RATE: 18 BRPM | DIASTOLIC BLOOD PRESSURE: 99 MMHG | OXYGEN SATURATION: 97 % | SYSTOLIC BLOOD PRESSURE: 168 MMHG | HEART RATE: 64 BPM

## 2022-02-10 VITALS
HEIGHT: 67 IN | DIASTOLIC BLOOD PRESSURE: 114 MMHG | HEART RATE: 83 BPM | RESPIRATION RATE: 18 BRPM | SYSTOLIC BLOOD PRESSURE: 183 MMHG | TEMPERATURE: 98 F | OXYGEN SATURATION: 99 % | WEIGHT: 123.02 LBS

## 2022-02-10 DIAGNOSIS — Z96.659 PRESENCE OF UNSPECIFIED ARTIFICIAL KNEE JOINT: Chronic | ICD-10-CM

## 2022-02-10 DIAGNOSIS — Z20.822 CONTACT WITH AND (SUSPECTED) EXPOSURE TO COVID-19: ICD-10-CM

## 2022-02-10 DIAGNOSIS — R10.30 LOWER ABDOMINAL PAIN, UNSPECIFIED: ICD-10-CM

## 2022-02-10 DIAGNOSIS — N39.0 URINARY TRACT INFECTION, SITE NOT SPECIFIED: ICD-10-CM

## 2022-02-10 DIAGNOSIS — I10 ESSENTIAL (PRIMARY) HYPERTENSION: ICD-10-CM

## 2022-02-10 LAB
ALBUMIN SERPL ELPH-MCNC: 4.3 G/DL — SIGNIFICANT CHANGE UP (ref 3.3–5)
ALP SERPL-CCNC: 135 U/L — HIGH (ref 40–120)
ALT FLD-CCNC: 8 U/L — LOW (ref 10–45)
ANION GAP SERPL CALC-SCNC: 11 MMOL/L — SIGNIFICANT CHANGE UP (ref 5–17)
APPEARANCE UR: CLEAR — SIGNIFICANT CHANGE UP
APPEARANCE UR: CLEAR — SIGNIFICANT CHANGE UP
APTT BLD: 35.9 SEC — HIGH (ref 27.5–35.5)
AST SERPL-CCNC: 13 U/L — SIGNIFICANT CHANGE UP (ref 10–40)
BASOPHILS # BLD AUTO: 0.03 K/UL — SIGNIFICANT CHANGE UP (ref 0–0.2)
BASOPHILS NFR BLD AUTO: 0.7 % — SIGNIFICANT CHANGE UP (ref 0–2)
BILIRUB SERPL-MCNC: 0.5 MG/DL — SIGNIFICANT CHANGE UP (ref 0.2–1.2)
BILIRUB UR-MCNC: NEGATIVE — SIGNIFICANT CHANGE UP
BILIRUB UR-MCNC: NEGATIVE — SIGNIFICANT CHANGE UP
BUN SERPL-MCNC: 11 MG/DL — SIGNIFICANT CHANGE UP (ref 7–23)
CALCIUM SERPL-MCNC: 9.5 MG/DL — SIGNIFICANT CHANGE UP (ref 8.4–10.5)
CHLORIDE SERPL-SCNC: 104 MMOL/L — SIGNIFICANT CHANGE UP (ref 96–108)
CO2 SERPL-SCNC: 27 MMOL/L — SIGNIFICANT CHANGE UP (ref 22–31)
COLOR SPEC: YELLOW — SIGNIFICANT CHANGE UP
COLOR SPEC: YELLOW — SIGNIFICANT CHANGE UP
CREAT SERPL-MCNC: 0.88 MG/DL — SIGNIFICANT CHANGE UP (ref 0.5–1.3)
DIFF PNL FLD: ABNORMAL
DIFF PNL FLD: ABNORMAL
EOSINOPHIL # BLD AUTO: 0.3 K/UL — SIGNIFICANT CHANGE UP (ref 0–0.5)
EOSINOPHIL NFR BLD AUTO: 7.2 % — HIGH (ref 0–6)
GLUCOSE SERPL-MCNC: 91 MG/DL — SIGNIFICANT CHANGE UP (ref 70–99)
GLUCOSE UR QL: NEGATIVE — SIGNIFICANT CHANGE UP
GLUCOSE UR QL: NEGATIVE — SIGNIFICANT CHANGE UP
HCT VFR BLD CALC: 43.9 % — SIGNIFICANT CHANGE UP (ref 39–50)
HGB BLD-MCNC: 14.1 G/DL — SIGNIFICANT CHANGE UP (ref 13–17)
IMM GRANULOCYTES NFR BLD AUTO: 0.2 % — SIGNIFICANT CHANGE UP (ref 0–1.5)
INR BLD: 1.04 — SIGNIFICANT CHANGE UP (ref 0.88–1.16)
KETONES UR-MCNC: NEGATIVE — SIGNIFICANT CHANGE UP
KETONES UR-MCNC: NEGATIVE — SIGNIFICANT CHANGE UP
LEUKOCYTE ESTERASE UR-ACNC: ABNORMAL
LEUKOCYTE ESTERASE UR-ACNC: ABNORMAL
LYMPHOCYTES # BLD AUTO: 1.4 K/UL — SIGNIFICANT CHANGE UP (ref 1–3.3)
LYMPHOCYTES # BLD AUTO: 33.7 % — SIGNIFICANT CHANGE UP (ref 13–44)
MCHC RBC-ENTMCNC: 31.5 PG — SIGNIFICANT CHANGE UP (ref 27–34)
MCHC RBC-ENTMCNC: 32.1 GM/DL — SIGNIFICANT CHANGE UP (ref 32–36)
MCV RBC AUTO: 98 FL — SIGNIFICANT CHANGE UP (ref 80–100)
MONOCYTES # BLD AUTO: 0.48 K/UL — SIGNIFICANT CHANGE UP (ref 0–0.9)
MONOCYTES NFR BLD AUTO: 11.6 % — SIGNIFICANT CHANGE UP (ref 2–14)
NEUTROPHILS # BLD AUTO: 1.93 K/UL — SIGNIFICANT CHANGE UP (ref 1.8–7.4)
NEUTROPHILS NFR BLD AUTO: 46.6 % — SIGNIFICANT CHANGE UP (ref 43–77)
NITRITE UR-MCNC: POSITIVE
NITRITE UR-MCNC: POSITIVE
NRBC # BLD: 0 /100 WBCS — SIGNIFICANT CHANGE UP (ref 0–0)
PH UR: 6.5 — SIGNIFICANT CHANGE UP (ref 5–8)
PH UR: 7.5 — SIGNIFICANT CHANGE UP (ref 5–8)
PLATELET # BLD AUTO: 238 K/UL — SIGNIFICANT CHANGE UP (ref 150–400)
POTASSIUM SERPL-MCNC: 3.5 MMOL/L — SIGNIFICANT CHANGE UP (ref 3.5–5.3)
POTASSIUM SERPL-SCNC: 3.5 MMOL/L — SIGNIFICANT CHANGE UP (ref 3.5–5.3)
PROT SERPL-MCNC: 7.5 G/DL — SIGNIFICANT CHANGE UP (ref 6–8.3)
PROT UR-MCNC: 100 MG/DL
PROT UR-MCNC: ABNORMAL MG/DL
PROTHROM AB SERPL-ACNC: 12.4 SEC — SIGNIFICANT CHANGE UP (ref 10.6–13.6)
RBC # BLD: 4.48 M/UL — SIGNIFICANT CHANGE UP (ref 4.2–5.8)
RBC # FLD: 13.5 % — SIGNIFICANT CHANGE UP (ref 10.3–14.5)
SARS-COV-2 RNA SPEC QL NAA+PROBE: SIGNIFICANT CHANGE UP
SODIUM SERPL-SCNC: 142 MMOL/L — SIGNIFICANT CHANGE UP (ref 135–145)
SP GR SPEC: 1.02 — SIGNIFICANT CHANGE UP (ref 1–1.03)
SP GR SPEC: 1.02 — SIGNIFICANT CHANGE UP (ref 1–1.03)
UROBILINOGEN FLD QL: 0.2 E.U./DL — SIGNIFICANT CHANGE UP
UROBILINOGEN FLD QL: 0.2 E.U./DL — SIGNIFICANT CHANGE UP
WBC # BLD: 4.15 K/UL — SIGNIFICANT CHANGE UP (ref 3.8–10.5)
WBC # FLD AUTO: 4.15 K/UL — SIGNIFICANT CHANGE UP (ref 3.8–10.5)

## 2022-02-10 PROCEDURE — 99284 EMERGENCY DEPT VISIT MOD MDM: CPT | Mod: 25

## 2022-02-10 PROCEDURE — 93010 ELECTROCARDIOGRAM REPORT: CPT

## 2022-02-10 PROCEDURE — 87086 URINE CULTURE/COLONY COUNT: CPT

## 2022-02-10 PROCEDURE — 80053 COMPREHEN METABOLIC PANEL: CPT

## 2022-02-10 PROCEDURE — 36415 COLL VENOUS BLD VENIPUNCTURE: CPT

## 2022-02-10 PROCEDURE — 81001 URINALYSIS AUTO W/SCOPE: CPT

## 2022-02-10 PROCEDURE — 85730 THROMBOPLASTIN TIME PARTIAL: CPT

## 2022-02-10 PROCEDURE — 99283 EMERGENCY DEPT VISIT LOW MDM: CPT

## 2022-02-10 PROCEDURE — 85025 COMPLETE CBC W/AUTO DIFF WBC: CPT

## 2022-02-10 PROCEDURE — 85610 PROTHROMBIN TIME: CPT

## 2022-02-10 PROCEDURE — 93005 ELECTROCARDIOGRAM TRACING: CPT

## 2022-02-10 PROCEDURE — U0005: CPT

## 2022-02-10 PROCEDURE — U0003: CPT

## 2022-02-10 RX ORDER — ACETAMINOPHEN 500 MG
975 TABLET ORAL ONCE
Refills: 0 | Status: COMPLETED | OUTPATIENT
Start: 2022-02-10 | End: 2022-02-10

## 2022-02-10 RX ORDER — CEFPODOXIME PROXETIL 100 MG
200 TABLET ORAL ONCE
Refills: 0 | Status: COMPLETED | OUTPATIENT
Start: 2022-02-10 | End: 2022-02-10

## 2022-02-10 RX ORDER — CEFPODOXIME PROXETIL 100 MG
1 TABLET ORAL
Qty: 20 | Refills: 0
Start: 2022-02-10 | End: 2022-02-19

## 2022-02-10 RX ADMIN — Medication 200 MILLIGRAM(S): at 10:44

## 2022-02-10 RX ADMIN — Medication 975 MILLIGRAM(S): at 10:40

## 2022-02-10 NOTE — ED PROVIDER NOTE - PHYSICAL EXAMINATION
GEN: Well appearing, well developed, awake, alert, oriented to person, place, time/situation and in no apparent distress. NTAF  ENT: Airway patent, Nasal mucosa clear. Mouth with normal mucosa.  EYES: Clear bilaterally. PERRL, EOMI  RESPIRATORY: Breathing comfortably with normal RR. No W/C/R, no hypoxia or resp distress.  CARDIAC: Regular rate and rhythm, no M/R/G  ABDOMEN: Soft, nontender, +bowel sounds, no rebound, rigidity, or guarding. No distention, nonsurgical abdomen. No CVAT b/l.   : Normal ext genitalia, no rash or lesions, martinez in place draining cloudy urine.   MSK: R shoulder arthritis. Range of motion is not limited, no deformities noted.  NEURO: Alert and oriented, no focal deficits.  SKIN: Skin normal color for race, warm, dry and intact. No evidence of rash.  PSYCH: Alert and oriented to person, place, time/situation. normal mood and affect. no apparent risk to self or others.

## 2022-02-10 NOTE — ED PROVIDER NOTE - NSFOLLOWUPINSTRUCTIONS_ED_ALL_ED_FT
Please follow up with your primary care physician. You may call our referrals coordinator at [phone] Monday to Friday 11am-7pm for assistance with making an appointment.  Return to the Emergency Department if you have any new or worsening symptoms, or for any other concerns. Please read below for further information.    Urinary Tract Infection    A urinary tract infection (UTI) is an infection of any part of the urinary tract, which includes the kidneys, ureters, bladder, and urethra. Risk factors include ignoring your need to urinate, wiping back to front if female, being an uncircumcised male, and having diabetes or a weak immune system. Symptoms include frequent urination, pain or burning with urination, foul smelling urine, cloudy urine, pain in the lower abdomen, blood in the urine, and fever. If you were prescribed an antibiotic medicine, take it as told by your health care provider. Do not stop taking the antibiotic even if you start to feel better.    SEEK IMMEDIATE MEDICAL CARE IF YOU HAVE ANY OF THE FOLLOWING SYMPTOMS: severe back or abdominal pain, fever, inability to keep fluids or medicine down, dizziness/lightheadedness, or a change in mental status.      Indwelling Urinary Catheter Care, Adult    An indwelling urinary catheter is a thin tube that is put into your bladder. The tube helps to drain pee (urine) out of your body. The tube goes in through your urethra. Your urethra is where pee comes out of your body. Your pee will come out through the catheter, then it will go into a bag (drainage bag).    Take good care of your catheter so it will work well.    How to wear your catheter and bag    Supplies needed     •Sticky tape (adhesive tape) or a leg strap.  •Alcohol wipe or soap and water (if you use tape).  •A clean towel (if you use tape).  •Large overnight bag.  •Smaller bag (leg bag).    Wearing your catheter     Attach your catheter to your leg with tape or a leg strap.  •Make sure the catheter is not pulled tight.      •If a leg strap gets wet, take it off and put on a dry strap.    •If you use tape to hold the bag on your le.Use an alcohol wipe or soap and water to wash your skin where the tape made it sticky before.  2.Use a clean towel to pat-dry that skin.  3.Use new tape to make the bag stay on your leg.    Wearing your bags    You should have been given a large overnight bag.  •You may wear the overnight bag in the day or night.    •Always have the overnight bag lower than your bladder.  Do not let the bag touch the floor.    •Before you go to sleep, put a clean plastic bag in a wastebasket. Then hang the overnight bag inside the wastebasket.    You should also have a smaller leg bag that fits under your clothes.  •Always wear the leg bag below your knee.    •Do not wear your leg bag at night.    How to care for your skin and catheter  Supplies needed   •A clean washcloth.  •Water and mild soap.  •A clean towel.    Caring for your skin and catheter     •Clean the skin around your catheter every day:  1.Wash your hands with soap and water.  2.Wet a clean washcloth in warm water and mild soap.  3.Clean the skin around your urethra.•If you are female:  •Gently spread the folds of skin around your vagina (labia).    •With the washcloth in your other hand, wipe the inner side of your labia on each side. Wipe from front to back.    •If you are male:  •Pull back any skin that covers the end of your penis (foreskin).  •With the washcloth in your other hand, wipe your penis in small circles. Start wiping at the tip of your penis, then move away from the catheter.    •Move the foreskin back in place, if needed.    4.With your free hand, hold the catheter close to where it goes into your body.  •Keep holding the catheter during cleaning so it does not get pulled out.    5.With the washcloth in your other hand, clean the catheter.  •Only wipe downward on the catheter.    •Do not wipe upward toward your body. Doing this may push germs into your urethra and cause infection.    6.Use a clean towel to pat-dry the catheter and the skin around it. Make sure to wipe off all soap.  7.Wash your hands with soap and water.    •Shower every day. Do not take baths.    • Do not use cream, ointment, or lotion on the area where the catheter goes into your body, unless your doctor tells you to.    • Do not use powders, sprays, or lotions on your genital area.    •Check your skin around the catheter every day for signs of infection. Check for:  •Redness, swelling, or pain.  •Fluid or blood.  •Warmth.  •Pus or a bad smell.    How to empty the bag    Supplies needed   •Rubbing alcohol.  •Gauze pad or cotton ball.  •Tape or a leg strap.    Emptying the bag     Pour the pee out of your bag when it is ?–½ full, or at least 2–3 times a day. Do this for your overnight bag and your leg bag.  1.Wash your hands with soap and water.  2.Separate (detach) the bag from your leg.  3.Hold the bag over the toilet or a clean pail. Keep the bag lower than your hips and bladder. This is so the pee (urine) does not go back into the tube.  4.Open the pour spout. It is at the bottom of the bag.  5.Empty the pee into the toilet or pail. Do not let the pour spout touch any surface.  6.Put rubbing alcohol on a gauze pad or cotton ball.  7.Use the gauze pad or cotton ball to clean the pour spout.  8.Close the pour spout.  9.Attach the bag to your leg with tape or a leg strap.  10.Wash your hands with soap and water.    Follow instructions for cleaning the drainage bag:  •From the product maker.  •As told by your doctor.    How to change the bag    Supplies needed   •Alcohol wipes.  •A clean bag.  •Tape or a leg strap.    Changing the bag     Replace your bag when it starts to leak, smell bad, or look dirty.  1.Wash your hands with soap and water.  2.Separate the dirty bag from your leg.  3.Pinch the catheter with your fingers so that pee does not spill out.  4.Separate the catheter tube from the bag tube where these tubes connect (at the connection valve). Do not let the tubes touch any surface.  5.Clean the end of the catheter tube with an alcohol wipe. Use a different alcohol wipe to clean the end of the bag tube.  6.Connect the catheter tube to the tube of the clean bag.  7.Attach the clean bag to your leg with tape or a leg strap. Do not make the bag tight on your leg.  8.Wash your hands with soap and water.    General rules    • Never pull on your catheter. Never try to take it out. Doing that can hurt you.  •Always wash your hands before and after you touch your catheter or bag. Use a mild, fragrance-free soap. If you do not have soap and water, use hand .  •Always make sure there are no twists or bends (kinks) in the catheter tube.  •Always make sure there are no leaks in the catheter or bag.  •Drink enough fluid to keep your pee pale yellow.  • Do not take baths, swim, or use a hot tub.  •If you are female, wipe from front to back after you poop (have a bowel movement).    Contact a doctor if:  •Your pee is cloudy.  •Your pee smells worse than usual.  •Your catheter gets clogged.  •Your catheter leaks.  •Your bladder feels full.    Get help right away if:  •You have redness, swelling, or pain where the catheter goes into your body.  •You have fluid, blood, pus, or a bad smell coming from the area where the catheter goes into your body.  •Your skin feels warm where the catheter goes into your body.  •You have a fever.  •You have pain in your:  •Belly (abdomen).  •Legs.  •Lower back.  •Bladder.    •You see blood in the catheter.  •Your pee is pink or red.  •You feel sick to your stomach (nauseous).  •You throw up (vomit).  •You have chills.  •Your pee is not draining into the bag.  •Your catheter gets pulled out.    Summary  •An indwelling urinary catheter is a thin tube that is placed into the bladder to help drain pee (urine) out of the body.   •The catheter is placed into the part of the body that drains pee from the bladder (urethra).  •Taking good care of your catheter will keep it working properly and help prevent problems.  •Always wash your hands before and after touching your catheter or bag.  • Never pull on your catheter or try to take it out.  This information is not intended to replace advice given to you by your health care provider. Make sure you discuss any questions you have with your health care provider.

## 2022-02-10 NOTE — ED ADULT TRIAGE NOTE - CHIEF COMPLAINT QUOTE
Pt presents to ED c/o lower abdominal pain x 1 week. Hx of prostate CA, urinary catheter and chronic UTI. States similar pain to last time he was here and needed abx for UTI. Denies fevers, chills. Noted to be hypertensive, 12 lead EKG done. Denies cp, dizziness, headache, blurred vision.

## 2022-02-10 NOTE — ED PROVIDER NOTE - CLINICAL SUMMARY MEDICAL DECISION MAKING FREE TEXT BOX
76M with pmhx of prostate ca in remission, HTN, indwelling martinez catheter, last changed approx 2 months ago (12/7/21) and treated for a UTI at that time who presents with lower abdominal discomfort, burning at martinez site and foul smelling urine. Feels like he has another UTi and does have an appointment with his urology until the end of March. He denies f/c, cp/sob, dizziness, syncope, non n/v, abd or flank pain, or hematuria. He also has chronic right shoulder pain for which he take Tylenol arthritis. 76M with pmhx of prostate ca in remission, HTN, indwelling martinez catheter, last changed approx 2 months ago (12/7/21) and treated for a UTI at that time who presents with lower abdominal discomfort, burning at martinez site and foul smelling urine. Feels like he has another UTi and does have an appointment with his urology until the end of March. He denies f/c, cp/sob, dizziness, syncope, non n/v, abd or flank pain, or hematuria. He also has chronic right shoulder pain for which he take Tylenol arthritis.    Pt well-appearing on exam, abd soft, nonsurgical, labs checked, martinez changed and UA +, will treat with course of cefpodoxime and pt has f/u scheduled with his urologist. No indication for emergent CT or imaging at this time. Tylenol given for chronic R shoulder pain.  Pt feeling improved and is stable for DC. ED evaluation and management discussed with the patient in detail.  Close PMD follow up encouraged.  Strict ED return instructions discussed in detail and patient given the opportunity to ask any questions about their discharge diagnosis and instructions. Patient verbalized understanding.

## 2022-02-10 NOTE — ED PROVIDER NOTE - OBJECTIVE STATEMENT
76M with pmhx of prostate ca in remission, HTN, indwelling martinez catheter x3 mo for chronic UTI (on antibiotics, unsure of name) presents with lower abdominal pain x1 day 76M with pmhx of prostate ca in remission, HTN, indwelling martinez catheter, last changed approx 2 months ago (12/7/21) and treated for a UTI at that time who presents with lower abdominal discomfort, burning at martinez site and foul smelling urine. Feels like he has another UTi and does have an appointment with his urology until the end of March. He denies f/c, cp/sob, dizziness, syncope, non n/v, abd or flank pain, or hematuria. He also has chronic right shoulder pain for which he take Tylenol arthritis.

## 2022-02-10 NOTE — ED ADULT NURSE NOTE - OBJECTIVE STATEMENT
pt is a 76M with pmhx of prostate CA, HTN, RA, indwelling martinez catheter x6mo for chronic UTI, finished course of abx ~month ago. pt presents with lower abdominal pain, burning penile pain, and chronic joint pain. per pt, states his urologist usually changes his martinez cath but hasn't followed for several months. Endorses malodorous urine and lower abd pain x1 week.

## 2022-02-10 NOTE — ED PROVIDER NOTE - PATIENT PORTAL LINK FT
You can access the FollowMyHealth Patient Portal offered by [facility] by registering at the following website: [email or web address]. By joining [employer]’s FollowMyHealth portal, you will also be able to view your health information using other applications (apps) compatible with our system.

## 2022-04-23 ENCOUNTER — EMERGENCY (EMERGENCY)
Facility: HOSPITAL | Age: 77
LOS: 1 days | Discharge: HOME | End: 2022-04-23
Attending: EMERGENCY MEDICINE | Admitting: EMERGENCY MEDICINE
Payer: MEDICARE

## 2022-04-23 VITALS
TEMPERATURE: 98 F | HEART RATE: 70 BPM | SYSTOLIC BLOOD PRESSURE: 136 MMHG | RESPIRATION RATE: 18 BRPM | DIASTOLIC BLOOD PRESSURE: 84 MMHG

## 2022-04-23 VITALS
SYSTOLIC BLOOD PRESSURE: 153 MMHG | HEIGHT: 67 IN | OXYGEN SATURATION: 100 % | RESPIRATION RATE: 18 BRPM | HEART RATE: 81 BPM | DIASTOLIC BLOOD PRESSURE: 95 MMHG | TEMPERATURE: 98 F | WEIGHT: 121.92 LBS

## 2022-04-23 DIAGNOSIS — M19.90 UNSPECIFIED OSTEOARTHRITIS, UNSPECIFIED SITE: ICD-10-CM

## 2022-04-23 DIAGNOSIS — C61 MALIGNANT NEOPLASM OF PROSTATE: ICD-10-CM

## 2022-04-23 DIAGNOSIS — Z87.19 PERSONAL HISTORY OF OTHER DISEASES OF THE DIGESTIVE SYSTEM: ICD-10-CM

## 2022-04-23 DIAGNOSIS — I73.9 PERIPHERAL VASCULAR DISEASE, UNSPECIFIED: ICD-10-CM

## 2022-04-23 DIAGNOSIS — I10 ESSENTIAL (PRIMARY) HYPERTENSION: ICD-10-CM

## 2022-04-23 DIAGNOSIS — N40.0 BENIGN PROSTATIC HYPERPLASIA WITHOUT LOWER URINARY TRACT SYMPTOMS: ICD-10-CM

## 2022-04-23 DIAGNOSIS — Z96.659 PRESENCE OF UNSPECIFIED ARTIFICIAL KNEE JOINT: ICD-10-CM

## 2022-04-23 DIAGNOSIS — M25.511 PAIN IN RIGHT SHOULDER: ICD-10-CM

## 2022-04-23 DIAGNOSIS — M25.512 PAIN IN LEFT SHOULDER: ICD-10-CM

## 2022-04-23 DIAGNOSIS — Z82.49 FAMILY HISTORY OF ISCHEMIC HEART DISEASE AND OTHER DISEASES OF THE CIRCULATORY SYSTEM: ICD-10-CM

## 2022-04-23 DIAGNOSIS — Z96.659 PRESENCE OF UNSPECIFIED ARTIFICIAL KNEE JOINT: Chronic | ICD-10-CM

## 2022-04-23 DIAGNOSIS — K57.90 DIVERTICULOSIS OF INTESTINE, PART UNSPECIFIED, WITHOUT PERFORATION OR ABSCESS WITHOUT BLEEDING: ICD-10-CM

## 2022-04-23 DIAGNOSIS — M50.30 OTHER CERVICAL DISC DEGENERATION, UNSPECIFIED CERVICAL REGION: ICD-10-CM

## 2022-04-23 DIAGNOSIS — D12.6 BENIGN NEOPLASM OF COLON, UNSPECIFIED: ICD-10-CM

## 2022-04-23 LAB
APPEARANCE UR: ABNORMAL
BACTERIA # UR AUTO: ABNORMAL /HPF
BILIRUB UR-MCNC: NEGATIVE — SIGNIFICANT CHANGE UP
COLOR SPEC: YELLOW — SIGNIFICANT CHANGE UP
COMMENT - URINE: SIGNIFICANT CHANGE UP
DIFF PNL FLD: ABNORMAL
EPI CELLS # UR: SIGNIFICANT CHANGE UP /HPF (ref 0–5)
GLUCOSE UR QL: NEGATIVE — SIGNIFICANT CHANGE UP
KETONES UR-MCNC: NEGATIVE — SIGNIFICANT CHANGE UP
LEUKOCYTE ESTERASE UR-ACNC: ABNORMAL
NITRITE UR-MCNC: POSITIVE
PH UR: 6.5 — SIGNIFICANT CHANGE UP (ref 5–8)
PROT UR-MCNC: ABNORMAL MG/DL
RBC CASTS # UR COMP ASSIST: ABNORMAL /HPF
SP GR SPEC: 1.02 — SIGNIFICANT CHANGE UP (ref 1–1.03)
UROBILINOGEN FLD QL: 0.2 E.U./DL — SIGNIFICANT CHANGE UP
WBC UR QL: > 10 /HPF

## 2022-04-23 PROCEDURE — 81001 URINALYSIS AUTO W/SCOPE: CPT

## 2022-04-23 PROCEDURE — 51702 INSERT TEMP BLADDER CATH: CPT

## 2022-04-23 PROCEDURE — 99284 EMERGENCY DEPT VISIT MOD MDM: CPT

## 2022-04-23 PROCEDURE — 87086 URINE CULTURE/COLONY COUNT: CPT

## 2022-04-23 PROCEDURE — 99283 EMERGENCY DEPT VISIT LOW MDM: CPT

## 2022-04-23 PROCEDURE — 87186 SC STD MICRODIL/AGAR DIL: CPT

## 2022-04-23 PROCEDURE — 96372 THER/PROPH/DIAG INJ SC/IM: CPT | Mod: XU

## 2022-04-23 RX ORDER — KETOROLAC TROMETHAMINE 30 MG/ML
30 SYRINGE (ML) INJECTION ONCE
Refills: 0 | Status: DISCONTINUED | OUTPATIENT
Start: 2022-04-23 | End: 2022-04-23

## 2022-04-23 RX ORDER — CEFPODOXIME PROXETIL 100 MG
200 TABLET ORAL ONCE
Refills: 0 | Status: COMPLETED | OUTPATIENT
Start: 2022-04-23 | End: 2022-04-23

## 2022-04-23 RX ORDER — CEFPODOXIME PROXETIL 100 MG
1 TABLET ORAL
Qty: 20 | Refills: 0
Start: 2022-04-23 | End: 2022-05-02

## 2022-04-23 RX ADMIN — Medication 200 MILLIGRAM(S): at 11:28

## 2022-04-23 RX ADMIN — Medication 30 MILLIGRAM(S): at 10:02

## 2022-04-23 RX ADMIN — Medication 30 MILLIGRAM(S): at 11:36

## 2022-04-23 NOTE — ED ADULT NURSE NOTE - NS ED NOTE ABUSE SUSPICION NEGLECT YN
Patient is calling stating that since last night when she urinates there is blood present and some flank pain but manageable.  Patient states that she has some discomfort in right kidney.  Patient unsure of who she should call.  Please reach out to patient to discuss on her work phone.   No

## 2022-04-23 NOTE — ED PROVIDER NOTE - NS ED ATTENDING STATEMENT MOD
I have seen and examined this patient and fully participated in the care of this patient as the teaching attending.  The service was shared with the JOSELIN.  I reviewed and verified the documentation and independently performed the documented: This was a shared visit with the JOSELIN. I reviewed and verified the documentation and independently performed the documented:

## 2022-04-23 NOTE — ED PROVIDER NOTE - ATTENDING CONTRIBUTION TO CARE
75 y/o m hx HTN, bph with indwelling martinez presents with multiple complaints.  Pt reports b/l shoulder pain which is chronic, right worse than left.  Pt has had xrays in the past showing arthritis and has received cortisone injections with improvement of pain.  Pt stating his pmd referred him to ortho but he can't find a doctor who accepts his insurance.  Pt has been taking tylenol with minimal improvement of pain.  Pt also has indwelling martinez catheter, last changed 2 months ago and states for the past few weeks there has been "pus" leaking around the catheter onto his underwear and he is concerned about an infection.  Pt denies abd pain, n/v/d, flank pain, fever, chills, trauma/fall, numbness/tingling to ext, all other ROS negative.    75 y/o m hx HTN, BPH with indwelling martinez presents c/o b/l shoulder pain, concern for UTI.  Pt afebrile, b/l shoulders mildly tender, no recent trauma, has had imaging in the past, requesting cortisone injection.  Pt informed cortisone injections done outpatient, given toradol in ED, will have referral coordinator f/u to coordinate ortho f/u.  Martinez changed in ED, will likely treat with abx, can f/u with urology    Agree with above note as documented by DARCY CAR was available as the supervising attending during patient's ER evaluation.

## 2022-04-23 NOTE — ED ADULT NURSE NOTE - OBJECTIVE STATEMENT
Rooming Note    Mavis Grande's goals for this visit include:   Chief Complaint   Patient presents with     Allergies     Mavis is here for patch testing day 1     Selma Preston LPN     Patient arrived to the ER for having martinez discomfort for the past 2 months with minimal discharge noted in underwear. 16fr martinez cath noted to be in place upon arrival. Pt states its been there for 2 months and place by his urologist. Pt also reports for having right shoulder pain for the past 15 years. Pt denies any other complaints at this moment.

## 2022-04-23 NOTE — ED PROVIDER NOTE - OBJECTIVE STATEMENT
75 y/o m hx HTN, bph with indwelling martinez presents with multiple complaints.  Pt reports b/l shoulder pain which is chronic, right worse than left.  Pt has had xrays in the past showing arthritis and has received cortisone injections with improvement of pain.  Pt stating his pmd referred him to ortho but he can't find a doctor who accepts his insurance.  Pt has been taking tylenol with minimal improvement of pain.  Pt also has indwelling martinez catheter, last changed 2 months ago and states for the past few weeks there has been "pus" leaking around the catheter onto his underwear and he is concerned about an infection.  Pt denies abd pain, n/v/d, flank pain, fever, chills, trauma/fall, numbness/tingling to ext, all other ROS negative.

## 2022-04-23 NOTE — ED PROVIDER NOTE - CLINICAL SUMMARY MEDICAL DECISION MAKING FREE TEXT BOX
75 y/o m hx HTN, BPH with indwelling martinez presents c/o b/l shoulder pain, concern for UTI.  Pt afebrile, b/l shoulders mildly tender, no recent trauma, has had imaging in the past, requesting cortisone injection.  Pt informed cortisone injections done outpatient, given toradol in ED, will have referral coordinator f/u to coordinate ortho f/u.  Martinez changed in ED, will likely treat with abx, can f/u with urology

## 2022-04-23 NOTE — ED ADULT TRIAGE NOTE - CHIEF COMPLAINT QUOTE
Pt w PMH arthritis, and prostate CA complains of white pus coming from martinez catheter, last changed 2 months. Pt complains of pain with urination. pt denies any fever or lower back pain. Pt also reports severe right should pain since 3 weeks ago. pt states he used to received steroids shot for arthritis.

## 2022-04-23 NOTE — ED PROVIDER NOTE - MUSCULOSKELETAL, MLM
b/l shoulders with no swelling or deformity, +FROM b/l, +generalized tenderness b/l with no crepitus, no skin discoloration, radial pulses 2+ b/l

## 2022-04-23 NOTE — ED PROVIDER NOTE - ATTENDING APP SHARED VISIT CONTRIBUTION OF CARE
77 y/o m hx HTN, bph with indwelling martinez presents with multiple complaints.  Pt reports b/l shoulder pain which is chronic, right worse than left.  Pt has had xrays in the past showing arthritis and has received cortisone injections with improvement of pain.  Pt stating his pmd referred him to ortho but he can't find a doctor who accepts his insurance.  Pt has been taking tylenol with minimal improvement of pain.  Pt also has indwelling martinez catheter, last changed 2 months ago and states for the past few weeks there has been "pus" leaking around the catheter onto his underwear and he is concerned about an infection.  Pt denies abd pain, n/v/d, flank pain, fever, chills, trauma/fall, numbness/tingling to ext, all other ROS negative.    77 y/o m hx HTN, BPH with indwelling martinez presents c/o b/l shoulder pain, concern for UTI.  Pt afebrile, b/l shoulders mildly tender, no recent trauma, has had imaging in the past, requesting cortisone injection.  Pt informed cortisone injections done outpatient, given toradol in ED, will have referral coordinator f/u to coordinate ortho f/u.  Martinez changed in ED, will likely treat with abx, can f/u with urology    Agree with above note as documented by DARCY CAR was available as the supervising attending during patient's ER evaluation.

## 2022-04-23 NOTE — ED PROVIDER NOTE - PATIENT PORTAL LINK FT
You can access the FollowMyHealth Patient Portal offered by [facility] by registering at the following website: [email or web address]. By joining [employer]’s FollowMyHealth portal, you will also be able to view your health information using other applications (apps) compatible with our system.

## 2022-04-23 NOTE — ED PROVIDER NOTE - NSFOLLOWUPINSTRUCTIONS_ED_ALL_ED_FT
Cuidados para un catéter urinario permanente, en adultos    Indwelling Urinary Catheter Care, Adult      Un catéter urinario permanente es un tubo warren, flexible y sin microbios (estéril) que se coloca en el interior de la vejiga para ayudar a drenar la orina fuera del cuerpo. El catéter se inserta dentro de la parte del cuerpo que drena la orina de la vejiga (uretra). La orina drena a través del catéter hacia afshan bolsa de drenaje fuera del cuerpo.    Si cuida adecuadamente del catéter, hará que funcione de manera correcta y ayudará a evitar problemas.      ¿Cuáles son los riesgos?    •Las bacterias pueden ingresar en la vejiga y provocar afshan infección de las vías urinarias.      •El flujo de orina se puede obstruir. [place] puede suceder si el catéter no está funcionando adecuadamente o si hay sedimentos o un coágulo de gualberto en la vejiga o el catéter.      •El tejido alrededor del catéter puede irritarse y sangrar.        Cómo usar el catéter y la bolsa de drenaje    Materiales necesarios     •Cinta adhesiva o gonzalez para pierna.      •Paño con alcohol o agua y jabón (si usa cinta adhesiva).      •Afshan toalla limpia (si usa cinta adhesiva).      •Afshan bolsa de drenaje para la noche.      •Afshan bolsa de drenaje más pequeña (bolsa de pierna).      Para usar el catéter y la bolsa     Use cinta adhesiva o afshan gonzalez para pierna para sujetar el catéter a la pierna.  •Asegúrese de que el catéter no esté tirante.      •Si se moja la gonzalez para pierna, cámbiela por afshan seca.    •Si usa cinta adhesiva:  1.Use un paño con alcohol o agua y jabón para yaw la piel si hay restos de adhesivo de la cinta adhesiva previa.      2.Use afshan toalla limpia para secar la jean pierre sin frotar.      3.Aplique cinta adhesiva nueva.        Usted debería tramaine recibido afshan bolsa de drenaje harsh de uso nocturno y afshan bolsa de pierna más pequeña para colocar debajo de la ropa.   •La bolsa para la noche puede usarse en cualquier momento, bib no debe usar la bolsa de pierna marva la noche.      •Use siempre la bolsa de pierna por debajo de mariee rodilla.      •Asegúrese de que la bolsa de drenaje para la noche esté siempre por debajo del nivel de la vejiga, bib no deje que toque el suelo. Antes de ir a dormir, cuelgue la bolsa dentro de un cesto de basura cubierto con afshan bolsa de plástico limpia.        Cómo cuidar la piel alrededor del catéter      Female anatomy showing the bladder, labia, and urethra and an indwelling urinary catheter in the bladder.       Male anatomy showing the bladder, penis, and uret and an indwelling urinary catheter in the bladder.     Materiales necesarios     •Un paño limpio.      •Agua y jabón suave.      •Afshan toalla limpia.      Para cuidar la piel y el catéter   •Todos los días, use un paño limpio y agua jabonosa para limpiar la piel alrededor del catéter.  1.Lávese las star con agua y jabón.      2.Moje un paño con agua tibia y jabón suave.    3.Limpie la piel alrededor de la uretra.•Si es john:  •Use afshan mano para abrir suavemente los pliegues de piel alrededor de la vagina (labios de la vulva).      •Con el paño en la otra mano, limpie el lado interior de los labios de la vulva de cada lado. Pascale esto de adelante hacia atrás.      •Si es hombre:  •Use afshan mano para empujar hacia atrás la piel que cubre el extremo del pene (prepucio).      •Con el paño en la otra mano, limpie el pene haciendo círculos pequeños. Comience a limpiar la punta del pene, luego limpie alejándose del catéter.      •Mueva el prepucio a mariee lugar, si corresponde.          4.Con la otra mano, sostenga el catéter cerca de donde ingresa en el cuerpo. Siga sosteniendo el catéter mientras limpia la jean pierre de modo de no tirar y sacarlo.    5.Use la otra mano para limpiar el catéter con el paño.  •Solo limpie el catéter hacia abajo, hacia la bolsa.      •No limpie hacia arriba, hacia el cuerpo, porque esto puede hacer que ingresen bacterias en la uretra y provocar afshan infección.        6.Use afshan toalla limpia para secar el catéter y la piel a mariee alrededor sin frotar. Asegúrese de eliminar todos los restos de jabón.      7.Lávese las star con agua y jabón.        •Dúchese a diario. No tome mariah de inmersión.      • No use cremas, ungüentos o lociones en la jean pierre donde el catéter entra en el cuerpo, a menos que se lo indique el médico.      • No use talcos, aerosoles ni lociones en la jean pierre genital.    •Controle la piel alrededor del catéter todos los días para detectar signos de infección. Esté atento a los siguientes signos:  •Enrojecimiento, hinchazón o dolor.      •Líquido o gualberto.      •Calor.      •Pus o mal olor.          Cómo vaciar la bolsa de drenaje    Materiales necesarios     •Alcohol rectificado.      •Apósito de gasa o adele de algodón.      •Cinta adhesiva o gonzalez para pierna.      Para vaciar la bolsa     Vacíe la bolsa de drenaje (la bolsa de drenaje para la noche o la bolsa de pierna) cuando se haya llenado hasta la mitad, o por lo menos 2 o 3 veces al día. Limpie la bolsa de drenaje según las instrucciones del fabricante o kevin se lo haya indicado el médico.  1.Lávese las star con agua y jabón.      2.Desprenda la bolsa de drenaje de mariee pierna.      3.Sostenga la bolsa de drenaje sobre el inodoro o un recipiente limpio. Asegúrese de que la bolsa de drenaje esté por debajo de las caderas y la vejiga. [place] impide que la orina vuelva a ingresar en los tubos y en mariee vejiga.      4.Severiano el radha vertedor que está en el fondo de la bolsa.      5.Vacíe la orina en el inodoro o recipiente. No permita que el radha vertedor toque ninguna superficie. Esta precaución es importante para evitar que las bacterias ingresen en la bolsa y provoquen afshan infección.      6.Ponga alcohol rectificado en el apósito de gasa o la adele de algodón.      7.Use el apósito de gasa o la adele de algodón para limpiar el radha vertedor.      8.Cierre el radha vertedor.      9.Sujete la bolsa a mariee pierna usando cinta adhesiva o afshan gonzalez para pierna.      10.Lávese las star con agua y jabón.        Cómo cambiar la bolsa de drenaje    Materiales necesarios:     •Paños con alcohol.      •Afshan bolsa de drenaje limpia.      •Cinta adhesiva o gonzalez para pierna.      Para cambiar la bolsa     Reemplace mariee bolsa de drenaje por afshan limpia cuando haya filtraciones, comience a oler mal o se samantha sucia.  1.Lávese las star con agua y jabón.      2.Desprenda la bolsa de drenaje sucia de mariee pierna.      3.Apriete el catéter con los dedos para que la orina no se derrame.      4.Desconecte el catéter del tubo de drenaje en la válvula de conexión. No permita que las sondas toquen ninguna superficie.      5.Limpie el extremo del catéter con un paño con alcohol. Use otro paño con alcohol para limpiar el extremo del tubo de drenaje.      6.Conecte el tubo del catéter al tubo de drenaje limpio.      7.Sujete la bolsa limpia a mariee pierna usando cinta adhesiva o afshan gonzalez para pierna. Evite ajustarla demasiado.      8.Lávese las star con agua y jabón.        Instrucciones generales  Washing hands with soap and water.   • Nunca tire del catéter ni trate de retirarlo. Al tirar, podría dañar andree tejidos internos.      •Lávese siempre las star antes y después de manipular el catéter o la bolsa de drenaje. Use un jabón suave y sin perfume. Use desinfectante para star si no dispone de agua y jabón.      •Asegúrese siempre de que no haya torceduras (pliegues) en el tubo del catéter.      •Asegúrese siempre de que no haya filtraciones en el catéter ni en la bolsa de drenaje.      •Beber suficiente líquido kevin para mantener la orina de color amarillo pálido.      • No tome mariah de inmersión, no practique natación ni utilice el jacuzzi.      •Si es john, higienícese de adelante hacia atrás después de defecar.        Comuníquese con un médico si:    •Mariee orina es turbia.      •Mariee orina huele inusualmente mal.      •El catéter se obstruye.      •El catéter empieza a tener filtraciones.      •Siente que mariee vejiga está llena.        Solicite ayuda inmediatamente si:    •Tiene enrojecimiento, hinchazón o dolor donde el catéter entra en el cuerpo.      •Tiene líquido, gualberto, pus o mal olor en el área donde el catéter entra en el cuerpo.      •El área donde el catéter entra en el cuerpo se siente caliente al tacto.      •Tiene fiebre.      •Siente dolor en el abdomen, las piernas, la jean pierre lumbar o la vejiga.      •Observa gualberto en el catéter.      •Mariee orina es de color candida o murrieta.      •Tiene náuseas, vómitos o escalofríos.      •Mariee orina no drena dentro de la bolsa.      •El catéter se sale del lugar.        Resumen    •Un catéter urinario permanente es un tubo warren, flexible y sin microbios (estéril) que se coloca en el interior de la vejiga para ayudar a drenar la orina fuera del cuerpo.      •El catéter se inserta dentro de la parte del cuerpo que drena la orina de la vejiga (uretra).      •Cuide adecuadamente del catéter para que funcione de manera correcta y ayudar a evitar que surjan problemas.      •Lávese siempre las star antes y después de manipular el catéter o la bolsa de drenaje.      • Nunca tire del catéter ni trate de retirarlo.      Esta información no tiene kevin fin reemplazar el consejo del médico. Asegúrese de hacerle al médico cualquier pregunta que tenga.

## 2022-04-25 LAB
-  AMPICILLIN/SULBACTAM: SIGNIFICANT CHANGE UP
-  AMPICILLIN/SULBACTAM: SIGNIFICANT CHANGE UP
-  AMPICILLIN: SIGNIFICANT CHANGE UP
-  AMPICILLIN: SIGNIFICANT CHANGE UP
-  CEFAZOLIN: SIGNIFICANT CHANGE UP
-  CEFAZOLIN: SIGNIFICANT CHANGE UP
-  CEFTRIAXONE: SIGNIFICANT CHANGE UP
-  CEFTRIAXONE: SIGNIFICANT CHANGE UP
-  CIPROFLOXACIN: SIGNIFICANT CHANGE UP
-  CIPROFLOXACIN: SIGNIFICANT CHANGE UP
-  ERTAPENEM: SIGNIFICANT CHANGE UP
-  ERTAPENEM: SIGNIFICANT CHANGE UP
-  GENTAMICIN: SIGNIFICANT CHANGE UP
-  GENTAMICIN: SIGNIFICANT CHANGE UP
-  NITROFURANTOIN: SIGNIFICANT CHANGE UP
-  NITROFURANTOIN: SIGNIFICANT CHANGE UP
-  PIPERACILLIN/TAZOBACTAM: SIGNIFICANT CHANGE UP
-  PIPERACILLIN/TAZOBACTAM: SIGNIFICANT CHANGE UP
-  TOBRAMYCIN: SIGNIFICANT CHANGE UP
-  TOBRAMYCIN: SIGNIFICANT CHANGE UP
-  TRIMETHOPRIM/SULFAMETHOXAZOLE: SIGNIFICANT CHANGE UP
-  TRIMETHOPRIM/SULFAMETHOXAZOLE: SIGNIFICANT CHANGE UP
CULTURE RESULTS: SIGNIFICANT CHANGE UP
METHOD TYPE: SIGNIFICANT CHANGE UP
METHOD TYPE: SIGNIFICANT CHANGE UP
ORGANISM # SPEC MICROSCOPIC CNT: SIGNIFICANT CHANGE UP
SPECIMEN SOURCE: SIGNIFICANT CHANGE UP

## 2022-04-25 NOTE — ED POST DISCHARGE NOTE - RESULT SUMMARY
urine cx. pt on cefpodoxime. e.coli sensitive to abx, however klebsiella "I", call to determine symptoms

## 2022-06-20 ENCOUNTER — EMERGENCY (EMERGENCY)
Facility: HOSPITAL | Age: 77
LOS: 1 days | Discharge: HOME | End: 2022-06-20
Attending: EMERGENCY MEDICINE | Admitting: EMERGENCY MEDICINE
Payer: MEDICARE

## 2022-06-20 VITALS
RESPIRATION RATE: 18 BRPM | DIASTOLIC BLOOD PRESSURE: 88 MMHG | OXYGEN SATURATION: 96 % | SYSTOLIC BLOOD PRESSURE: 153 MMHG | HEART RATE: 67 BPM | TEMPERATURE: 98 F

## 2022-06-20 VITALS
RESPIRATION RATE: 18 BRPM | SYSTOLIC BLOOD PRESSURE: 141 MMHG | OXYGEN SATURATION: 96 % | TEMPERATURE: 98 F | WEIGHT: 121.92 LBS | HEIGHT: 67 IN | HEART RATE: 83 BPM | DIASTOLIC BLOOD PRESSURE: 97 MMHG

## 2022-06-20 DIAGNOSIS — R33.9 RETENTION OF URINE, UNSPECIFIED: ICD-10-CM

## 2022-06-20 DIAGNOSIS — Z96.659 PRESENCE OF UNSPECIFIED ARTIFICIAL KNEE JOINT: Chronic | ICD-10-CM
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
## 2022-06-20 DIAGNOSIS — K59.00 CONSTIPATION, UNSPECIFIED: ICD-10-CM

## 2022-06-20 DIAGNOSIS — R10.9 UNSPECIFIED ABDOMINAL PAIN: ICD-10-CM

## 2022-06-20 DIAGNOSIS — N40.0 BENIGN PROSTATIC HYPERPLASIA WITHOUT LOWER URINARY TRACT SYMPTOMS: ICD-10-CM

## 2022-06-20 DIAGNOSIS — I10 ESSENTIAL (PRIMARY) HYPERTENSION: ICD-10-CM

## 2022-06-20 DIAGNOSIS — T83.031A LEAKAGE OF INDWELLING URETHRAL CATHETER, INITIAL ENCOUNTER: ICD-10-CM

## 2022-06-20 DIAGNOSIS — N39.0 URINARY TRACT INFECTION, SITE NOT SPECIFIED: ICD-10-CM
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
## 2022-06-20 LAB
ALBUMIN SERPL ELPH-MCNC: 4.1 G/DL — SIGNIFICANT CHANGE UP (ref 3.3–5)
ALP SERPL-CCNC: 149 U/L — HIGH (ref 40–120)
ALT FLD-CCNC: 10 U/L — SIGNIFICANT CHANGE UP (ref 10–45)
ANION GAP SERPL CALC-SCNC: 8 MMOL/L — SIGNIFICANT CHANGE UP (ref 5–17)
APPEARANCE UR: CLEAR — SIGNIFICANT CHANGE UP
AST SERPL-CCNC: 19 U/L — SIGNIFICANT CHANGE UP (ref 10–40)
BACTERIA # UR AUTO: ABNORMAL /HPF
BASOPHILS # BLD AUTO: 0.03 K/UL — SIGNIFICANT CHANGE UP (ref 0–0.2)
BASOPHILS NFR BLD AUTO: 0.4 % — SIGNIFICANT CHANGE UP (ref 0–2)
BILIRUB SERPL-MCNC: 0.3 MG/DL — SIGNIFICANT CHANGE UP (ref 0.2–1.2)
BILIRUB UR-MCNC: NEGATIVE — SIGNIFICANT CHANGE UP
BUN SERPL-MCNC: 14 MG/DL — SIGNIFICANT CHANGE UP (ref 7–23)
CALCIUM SERPL-MCNC: 9.1 MG/DL — SIGNIFICANT CHANGE UP (ref 8.4–10.5)
CHLORIDE SERPL-SCNC: 100 MMOL/L — SIGNIFICANT CHANGE UP (ref 96–108)
CO2 SERPL-SCNC: 31 MMOL/L — SIGNIFICANT CHANGE UP (ref 22–31)
COLOR SPEC: YELLOW — SIGNIFICANT CHANGE UP
CREAT SERPL-MCNC: 0.89 MG/DL — SIGNIFICANT CHANGE UP (ref 0.5–1.3)
DIFF PNL FLD: ABNORMAL
EGFR: 89 ML/MIN/1.73M2 — SIGNIFICANT CHANGE UP
EOSINOPHIL # BLD AUTO: 0.33 K/UL — SIGNIFICANT CHANGE UP (ref 0–0.5)
EOSINOPHIL NFR BLD AUTO: 4.9 % — SIGNIFICANT CHANGE UP (ref 0–6)
EPI CELLS # UR: SIGNIFICANT CHANGE UP /HPF (ref 0–5)
GLUCOSE SERPL-MCNC: 82 MG/DL — SIGNIFICANT CHANGE UP (ref 70–99)
GLUCOSE UR QL: NEGATIVE — SIGNIFICANT CHANGE UP
HCT VFR BLD CALC: 39.9 % — SIGNIFICANT CHANGE UP (ref 39–50)
HGB BLD-MCNC: 13.1 G/DL — SIGNIFICANT CHANGE UP (ref 13–17)
IMM GRANULOCYTES NFR BLD AUTO: 0.3 % — SIGNIFICANT CHANGE UP (ref 0–1.5)
KETONES UR-MCNC: NEGATIVE — SIGNIFICANT CHANGE UP
LACTATE SERPL-SCNC: 1.1 MMOL/L — SIGNIFICANT CHANGE UP (ref 0.5–2)
LEUKOCYTE ESTERASE UR-ACNC: ABNORMAL
LIDOCAIN IGE QN: 25 U/L — SIGNIFICANT CHANGE UP (ref 7–60)
LYMPHOCYTES # BLD AUTO: 1.07 K/UL — SIGNIFICANT CHANGE UP (ref 1–3.3)
LYMPHOCYTES # BLD AUTO: 15.9 % — SIGNIFICANT CHANGE UP (ref 13–44)
MCHC RBC-ENTMCNC: 31.8 PG — SIGNIFICANT CHANGE UP (ref 27–34)
MCHC RBC-ENTMCNC: 32.8 GM/DL — SIGNIFICANT CHANGE UP (ref 32–36)
MCV RBC AUTO: 96.8 FL — SIGNIFICANT CHANGE UP (ref 80–100)
MONOCYTES # BLD AUTO: 0.68 K/UL — SIGNIFICANT CHANGE UP (ref 0–0.9)
MONOCYTES NFR BLD AUTO: 10.1 % — SIGNIFICANT CHANGE UP (ref 2–14)
NEUTROPHILS # BLD AUTO: 4.59 K/UL — SIGNIFICANT CHANGE UP (ref 1.8–7.4)
NEUTROPHILS NFR BLD AUTO: 68.4 % — SIGNIFICANT CHANGE UP (ref 43–77)
NITRITE UR-MCNC: POSITIVE
NRBC # BLD: 0 /100 WBCS — SIGNIFICANT CHANGE UP (ref 0–0)
PH UR: 6 — SIGNIFICANT CHANGE UP (ref 5–8)
PLATELET # BLD AUTO: 302 K/UL — SIGNIFICANT CHANGE UP (ref 150–400)
POTASSIUM SERPL-MCNC: 4 MMOL/L — SIGNIFICANT CHANGE UP (ref 3.5–5.3)
POTASSIUM SERPL-SCNC: 4 MMOL/L — SIGNIFICANT CHANGE UP (ref 3.5–5.3)
PROT SERPL-MCNC: 7.3 G/DL — SIGNIFICANT CHANGE UP (ref 6–8.3)
PROT UR-MCNC: NEGATIVE MG/DL — SIGNIFICANT CHANGE UP
RBC # BLD: 4.12 M/UL — LOW (ref 4.2–5.8)
RBC # FLD: 14.8 % — HIGH (ref 10.3–14.5)
RBC CASTS # UR COMP ASSIST: ABNORMAL /HPF
SODIUM SERPL-SCNC: 139 MMOL/L — SIGNIFICANT CHANGE UP (ref 135–145)
SP GR SPEC: 1.02 — SIGNIFICANT CHANGE UP (ref 1–1.03)
UROBILINOGEN FLD QL: 0.2 E.U./DL — SIGNIFICANT CHANGE UP
WBC # BLD: 6.72 K/UL — SIGNIFICANT CHANGE UP (ref 3.8–10.5)
WBC # FLD AUTO: 6.72 K/UL — SIGNIFICANT CHANGE UP (ref 3.8–10.5)
WBC UR QL: > 10 /HPF

## 2022-06-20 PROCEDURE — 99285 EMERGENCY DEPT VISIT HI MDM: CPT

## 2022-06-20 PROCEDURE — 83690 ASSAY OF LIPASE: CPT

## 2022-06-20 PROCEDURE — 81001 URINALYSIS AUTO W/SCOPE: CPT

## 2022-06-20 PROCEDURE — 80053 COMPREHEN METABOLIC PANEL: CPT

## 2022-06-20 PROCEDURE — 36415 COLL VENOUS BLD VENIPUNCTURE: CPT

## 2022-06-20 PROCEDURE — 74177 CT ABD & PELVIS W/CONTRAST: CPT | Mod: 26,MA

## 2022-06-20 PROCEDURE — 87086 URINE CULTURE/COLONY COUNT: CPT

## 2022-06-20 PROCEDURE — 85025 COMPLETE CBC W/AUTO DIFF WBC: CPT

## 2022-06-20 PROCEDURE — 99284 EMERGENCY DEPT VISIT MOD MDM: CPT | Mod: 25

## 2022-06-20 PROCEDURE — 83605 ASSAY OF LACTIC ACID: CPT

## 2022-06-20 PROCEDURE — 74177 CT ABD & PELVIS W/CONTRAST: CPT | Mod: MA

## 2022-06-20 PROCEDURE — 96374 THER/PROPH/DIAG INJ IV PUSH: CPT | Mod: XU

## 2022-06-20 PROCEDURE — 51702 INSERT TEMP BLADDER CATH: CPT

## 2022-06-20 RX ORDER — CEFPODOXIME PROXETIL 100 MG
1 TABLET ORAL
Qty: 20 | Refills: 0
Start: 2022-06-20 | End: 2022-06-29

## 2022-06-20 RX ORDER — SODIUM CHLORIDE 9 MG/ML
1000 INJECTION INTRAMUSCULAR; INTRAVENOUS; SUBCUTANEOUS ONCE
Refills: 0 | Status: COMPLETED | OUTPATIENT
Start: 2022-06-20 | End: 2022-06-20

## 2022-06-20 RX ORDER — DIATRIZOATE MEGLUMINE 180 MG/ML
30 INJECTION, SOLUTION INTRAVESICAL ONCE
Refills: 0 | Status: COMPLETED | OUTPATIENT
Start: 2022-06-20 | End: 2022-06-20

## 2022-06-20 RX ORDER — IOHEXOL 300 MG/ML
30 INJECTION, SOLUTION INTRAVENOUS ONCE
Refills: 0 | Status: DISCONTINUED | OUTPATIENT
Start: 2022-06-20 | End: 2022-06-20

## 2022-06-20 RX ORDER — CEFTRIAXONE 500 MG/1
1000 INJECTION, POWDER, FOR SOLUTION INTRAMUSCULAR; INTRAVENOUS ONCE
Refills: 0 | Status: COMPLETED | OUTPATIENT
Start: 2022-06-20 | End: 2022-06-20

## 2022-06-20 RX ORDER — ACETAMINOPHEN 500 MG
650 TABLET ORAL ONCE
Refills: 0 | Status: COMPLETED | OUTPATIENT
Start: 2022-06-20 | End: 2022-06-20

## 2022-06-20 RX ADMIN — DIATRIZOATE MEGLUMINE 30 MILLILITER(S): 180 INJECTION, SOLUTION INTRAVESICAL at 10:32

## 2022-06-20 RX ADMIN — CEFTRIAXONE 100 MILLIGRAM(S): 500 INJECTION, POWDER, FOR SOLUTION INTRAMUSCULAR; INTRAVENOUS at 11:33

## 2022-06-20 RX ADMIN — Medication 650 MILLIGRAM(S): at 10:32

## 2022-06-20 RX ADMIN — SODIUM CHLORIDE 1000 MILLILITER(S): 9 INJECTION INTRAMUSCULAR; INTRAVENOUS; SUBCUTANEOUS at 10:32

## 2022-06-20 NOTE — ED PROVIDER NOTE - CLINICAL SUMMARY MEDICAL DECISION MAKING FREE TEXT BOX
76M PMH HTN, BPH w/ chronic indwelling martinez (for >1yr) p/w abd pain/martinez problem. C/o mid abd pain, x4-5d. Also 4-5d of urine leaking around martinez and penis pain. Martinez last exchanged ~2mos ago while in ED at [facility], diagnosed w/ UTI and dc'd w/ cefpodoxime. Urine cx grew E coli, also klebsiella oxytoca/raoutella ornithinolytica. Pt states he has  appt tomorrow. No other systemic symptoms.   Vitals wnl, exam as above.  Martinez exchanged --> ~100cc output, pt still has abd pain and mild abd ttp.   ddx: Possible colitis vs. UTI  Labs, CT, IVF/symptom control, reassess.

## 2022-06-20 NOTE — ED PROVIDER NOTE - OBJECTIVE STATEMENT
Conversive in English,  Filiberto [id number]  76M PMH HTN, BPH w/ chronic indwelling martinez (for >1yr) p/w abd pain/martinez problem. C/o mid abd pain, x4-5d. Also 4-5d of urine leaking around martinez and penis pain. Martinez last exchanged ~2mos ago while in ED at [facility], diagnosed w/ UTI and dc'd w/ cefpodoxime. Urine cx grew E coli, also klebsiella oxytoca/raoutella ornithinolytica. Pt states he has  appt tomorrow. No other systemic symptoms.   Denies fevers, chills, nausea, vomiting, diarrhea, black stool, bloody stool, focal weakness/numbness, lightheadedness, back pain, testicular pain, rashes, joint pains, SOB, CP, rhinorrhea, nasal congestion, sore throat, cough.

## 2022-06-20 NOTE — ED PROVIDER NOTE - PROGRESS NOTE DETAILS
Klepfish: UA+. Will give ceftriaxone. Labs grossly wnl. CT pending, will reassess. Klepfish: CT showing "Severe constipation. No colitis. Incidental hypervascular focus at the anal verge, stable from 2021, hemorrhoids versus focal lesion, correlate with physical exam. Other incidental comments as above."  Pt feeling much better, abd soft NTND. Discussed all results with patient, including any incidental radiological findings and lab abnormalities. Given copy of results and instructed to bring copy to primary doctor.   Discussed importance of outpt follow up and return precautions. Clinically no indication for further emergent ED workup or hospitalization at this time. Comfortable for dc, outpt f/u.

## 2022-06-20 NOTE — ED PROVIDER NOTE - CARE PLAN
1 Principal Discharge DX:	Urinary retention  Secondary Diagnosis:	Abdominal pain   Principal Discharge DX:	Urinary retention  Secondary Diagnosis:	Abdominal pain  Secondary Diagnosis:	UTI (urinary tract infection)   Principal Discharge DX:	Urinary retention  Secondary Diagnosis:	Abdominal pain  Secondary Diagnosis:	UTI (urinary tract infection)  Secondary Diagnosis:	Constipation

## 2022-06-20 NOTE — ED ADULT NURSE NOTE - OBJECTIVE STATEMENT
pt presents to ER c/o leaking urine around martinez catheter. pt also c/o some abdominal discomfort. pt denies n/v and fevers.

## 2022-06-20 NOTE — ED ADULT TRIAGE NOTE - CHIEF COMPLAINT QUOTE
Pt here co catheter complication. Co increasing pain to urinary catheter and penis, pt states, "you put the catheter in here 2 months ago and I haven't seen my urologist since and it really hurts." pt catheter leaking in front triage area. Denies fevers, chills, foul smelling odor to urine, blood in urine.

## 2022-06-20 NOTE — ED PROVIDER NOTE - PATIENT PORTAL LINK FT
You can access the FollowMyHealth Patient Portal offered by [facility] by registering at the following website: [email or web address]. By joining [email or web address]’s FollowMyHealth portal, you will also be able to view your health information using other applications (apps) compatible with our system.

## 2022-06-20 NOTE — ED PROVIDER NOTE - PHYSICAL EXAMINATION
abd: soft, periumbilical ttp, no rebound/guarding.   Dawkins appears in place, no urine in bag, +urine on pants.   no LE edema, normal equal distal pulses, steady unassisted gait.

## 2022-06-21 LAB
CULTURE RESULTS: SIGNIFICANT CHANGE UP
SPECIMEN SOURCE: SIGNIFICANT CHANGE UP

## 2022-08-13 NOTE — ED PROVIDER NOTE - MUSCULOSKELETAL, MLM
Spine appears normal, range of motion is not limited, no muscle or joint tenderness (1) More than 48 hours/None

## 2022-10-07 ENCOUNTER — APPOINTMENT (OUTPATIENT)
Dept: NUCLEAR MEDICINE | Facility: HOSPITAL | Age: 77
End: 2022-10-07

## 2022-12-30 ENCOUNTER — EMERGENCY (EMERGENCY)
Facility: HOSPITAL | Age: 77
LOS: 1 days | Discharge: HOME | End: 2022-12-30
Attending: STUDENT IN AN ORGANIZED HEALTH CARE EDUCATION/TRAINING PROGRAM | Admitting: STUDENT IN AN ORGANIZED HEALTH CARE EDUCATION/TRAINING PROGRAM
Payer: SELF-PAY

## 2022-12-30 VITALS
RESPIRATION RATE: 18 BRPM | WEIGHT: 121.92 LBS | TEMPERATURE: 99 F | HEART RATE: 82 BPM | OXYGEN SATURATION: 97 % | DIASTOLIC BLOOD PRESSURE: 87 MMHG | HEIGHT: 67 IN | SYSTOLIC BLOOD PRESSURE: 147 MMHG

## 2022-12-30 DIAGNOSIS — Z96.659 PRESENCE OF UNSPECIFIED ARTIFICIAL KNEE JOINT: ICD-10-CM

## 2022-12-30 DIAGNOSIS — Z20.822 CONTACT WITH AND (SUSPECTED) EXPOSURE TO COVID-19: ICD-10-CM

## 2022-12-30 DIAGNOSIS — R10.13 EPIGASTRIC PAIN: ICD-10-CM

## 2022-12-30 DIAGNOSIS — Z86.79 PERSONAL HISTORY OF OTHER DISEASES OF THE CIRCULATORY SYSTEM: ICD-10-CM

## 2022-12-30 DIAGNOSIS — Z96.659 PRESENCE OF UNSPECIFIED ARTIFICIAL KNEE JOINT: Chronic | ICD-10-CM

## 2022-12-30 DIAGNOSIS — Z87.19 PERSONAL HISTORY OF OTHER DISEASES OF THE DIGESTIVE SYSTEM: ICD-10-CM

## 2022-12-30 DIAGNOSIS — Z85.46 PERSONAL HISTORY OF MALIGNANT NEOPLASM OF PROSTATE: ICD-10-CM

## 2022-12-30 DIAGNOSIS — Z85.038 PERSONAL HISTORY OF OTHER MALIGNANT NEOPLASM OF LARGE INTESTINE: ICD-10-CM

## 2022-12-30 DIAGNOSIS — N39.0 URINARY TRACT INFECTION, SITE NOT SPECIFIED: ICD-10-CM

## 2022-12-30 DIAGNOSIS — Z86.39 PERSONAL HISTORY OF OTHER ENDOCRINE, NUTRITIONAL AND METABOLIC DISEASE: ICD-10-CM

## 2022-12-30 DIAGNOSIS — R10.11 RIGHT UPPER QUADRANT PAIN: ICD-10-CM

## 2022-12-30 DIAGNOSIS — Z87.39 PERSONAL HISTORY OF OTHER DISEASES OF THE MUSCULOSKELETAL SYSTEM AND CONNECTIVE TISSUE: ICD-10-CM

## 2022-12-30 LAB
ALBUMIN SERPL ELPH-MCNC: 4 G/DL — SIGNIFICANT CHANGE UP (ref 3.3–5)
ALP SERPL-CCNC: 141 U/L — HIGH (ref 40–120)
ALT FLD-CCNC: 8 U/L — LOW (ref 10–45)
ANION GAP SERPL CALC-SCNC: 11 MMOL/L — SIGNIFICANT CHANGE UP (ref 5–17)
APPEARANCE UR: CLEAR — SIGNIFICANT CHANGE UP
AST SERPL-CCNC: 14 U/L — SIGNIFICANT CHANGE UP (ref 10–40)
BACTERIA # UR AUTO: ABNORMAL /HPF
BILIRUB SERPL-MCNC: 0.2 MG/DL — SIGNIFICANT CHANGE UP (ref 0.2–1.2)
BILIRUB UR-MCNC: NEGATIVE — SIGNIFICANT CHANGE UP
BUN SERPL-MCNC: 14 MG/DL — SIGNIFICANT CHANGE UP (ref 7–23)
CALCIUM SERPL-MCNC: 8.6 MG/DL — SIGNIFICANT CHANGE UP (ref 8.4–10.5)
CHLORIDE SERPL-SCNC: 104 MMOL/L — SIGNIFICANT CHANGE UP (ref 96–108)
CO2 SERPL-SCNC: 26 MMOL/L — SIGNIFICANT CHANGE UP (ref 22–31)
COLOR SPEC: YELLOW — SIGNIFICANT CHANGE UP
COMMENT - URINE: SIGNIFICANT CHANGE UP
CREAT SERPL-MCNC: 1.01 MG/DL — SIGNIFICANT CHANGE UP (ref 0.5–1.3)
DIFF PNL FLD: NEGATIVE — SIGNIFICANT CHANGE UP
EGFR: 77 ML/MIN/1.73M2 — SIGNIFICANT CHANGE UP
EPI CELLS # UR: SIGNIFICANT CHANGE UP /HPF (ref 0–5)
GLUCOSE SERPL-MCNC: 84 MG/DL — SIGNIFICANT CHANGE UP (ref 70–99)
GLUCOSE UR QL: NEGATIVE — SIGNIFICANT CHANGE UP
HCT VFR BLD CALC: 32.2 % — LOW (ref 39–50)
HGB BLD-MCNC: 10.3 G/DL — LOW (ref 13–17)
KETONES UR-MCNC: ABNORMAL MG/DL
LEUKOCYTE ESTERASE UR-ACNC: ABNORMAL
LIDOCAIN IGE QN: 19 U/L — SIGNIFICANT CHANGE UP (ref 7–60)
MCHC RBC-ENTMCNC: 30.9 PG — SIGNIFICANT CHANGE UP (ref 27–34)
MCHC RBC-ENTMCNC: 32 GM/DL — SIGNIFICANT CHANGE UP (ref 32–36)
MCV RBC AUTO: 96.7 FL — SIGNIFICANT CHANGE UP (ref 80–100)
NITRITE UR-MCNC: POSITIVE
NRBC # BLD: 0 /100 WBCS — SIGNIFICANT CHANGE UP (ref 0–0)
PH UR: 6 — SIGNIFICANT CHANGE UP (ref 5–8)
PLATELET # BLD AUTO: 310 K/UL — SIGNIFICANT CHANGE UP (ref 150–400)
POTASSIUM SERPL-MCNC: 3.6 MMOL/L — SIGNIFICANT CHANGE UP (ref 3.5–5.3)
POTASSIUM SERPL-SCNC: 3.6 MMOL/L — SIGNIFICANT CHANGE UP (ref 3.5–5.3)
PROT SERPL-MCNC: 7 G/DL — SIGNIFICANT CHANGE UP (ref 6–8.3)
PROT UR-MCNC: ABNORMAL MG/DL
RBC # BLD: 3.33 M/UL — LOW (ref 4.2–5.8)
RBC # FLD: 14.3 % — SIGNIFICANT CHANGE UP (ref 10.3–14.5)
RBC CASTS # UR COMP ASSIST: < 5 /HPF — SIGNIFICANT CHANGE UP
SARS-COV-2 RNA SPEC QL NAA+PROBE: NEGATIVE — SIGNIFICANT CHANGE UP
SODIUM SERPL-SCNC: 141 MMOL/L — SIGNIFICANT CHANGE UP (ref 135–145)
SP GR SPEC: >=1.03 — SIGNIFICANT CHANGE UP (ref 1–1.03)
UROBILINOGEN FLD QL: 0.2 E.U./DL — SIGNIFICANT CHANGE UP
WBC # BLD: 5.09 K/UL — SIGNIFICANT CHANGE UP (ref 3.8–10.5)
WBC # FLD AUTO: 5.09 K/UL — SIGNIFICANT CHANGE UP (ref 3.8–10.5)
WBC UR QL: ABNORMAL /HPF

## 2022-12-30 PROCEDURE — 87086 URINE CULTURE/COLONY COUNT: CPT

## 2022-12-30 PROCEDURE — 74177 CT ABD & PELVIS W/CONTRAST: CPT | Mod: 26,MG

## 2022-12-30 PROCEDURE — 74177 CT ABD & PELVIS W/CONTRAST: CPT | Mod: MG

## 2022-12-30 PROCEDURE — 99284 EMERGENCY DEPT VISIT MOD MDM: CPT | Mod: 25

## 2022-12-30 PROCEDURE — 99285 EMERGENCY DEPT VISIT HI MDM: CPT

## 2022-12-30 PROCEDURE — G1004: CPT

## 2022-12-30 PROCEDURE — 87635 SARS-COV-2 COVID-19 AMP PRB: CPT

## 2022-12-30 PROCEDURE — 36415 COLL VENOUS BLD VENIPUNCTURE: CPT

## 2022-12-30 PROCEDURE — 96375 TX/PRO/DX INJ NEW DRUG ADDON: CPT

## 2022-12-30 PROCEDURE — 81001 URINALYSIS AUTO W/SCOPE: CPT

## 2022-12-30 PROCEDURE — 83690 ASSAY OF LIPASE: CPT

## 2022-12-30 PROCEDURE — 87186 SC STD MICRODIL/AGAR DIL: CPT

## 2022-12-30 PROCEDURE — 96374 THER/PROPH/DIAG INJ IV PUSH: CPT | Mod: XU

## 2022-12-30 PROCEDURE — 80053 COMPREHEN METABOLIC PANEL: CPT

## 2022-12-30 PROCEDURE — 85027 COMPLETE CBC AUTOMATED: CPT

## 2022-12-30 RX ORDER — DIATRIZOATE MEGLUMINE 180 MG/ML
30 INJECTION, SOLUTION INTRAVESICAL ONCE
Refills: 0 | Status: COMPLETED | OUTPATIENT
Start: 2022-12-30 | End: 2022-12-30

## 2022-12-30 RX ORDER — ONDANSETRON 8 MG/1
4 TABLET, FILM COATED ORAL ONCE
Refills: 0 | Status: COMPLETED | OUTPATIENT
Start: 2022-12-30 | End: 2022-12-30

## 2022-12-30 RX ORDER — SODIUM CHLORIDE 9 MG/ML
1000 INJECTION INTRAMUSCULAR; INTRAVENOUS; SUBCUTANEOUS ONCE
Refills: 0 | Status: COMPLETED | OUTPATIENT
Start: 2022-12-30 | End: 2022-12-30

## 2022-12-30 RX ORDER — CEFPODOXIME PROXETIL 100 MG
1 TABLET ORAL
Qty: 14 | Refills: 0
Start: 2022-12-30 | End: 2023-01-05

## 2022-12-30 RX ORDER — MORPHINE SULFATE 50 MG/1
4 CAPSULE, EXTENDED RELEASE ORAL ONCE
Refills: 0 | Status: DISCONTINUED | OUTPATIENT
Start: 2022-12-30 | End: 2022-12-30

## 2022-12-30 RX ORDER — CEFTRIAXONE 500 MG/1
1000 INJECTION, POWDER, FOR SOLUTION INTRAMUSCULAR; INTRAVENOUS ONCE
Refills: 0 | Status: COMPLETED | OUTPATIENT
Start: 2022-12-30 | End: 2022-12-30

## 2022-12-30 RX ORDER — CEFPODOXIME PROXETIL 100 MG
1 TABLET ORAL
Qty: 20 | Refills: 0
Start: 2022-12-30 | End: 2023-01-08

## 2022-12-30 RX ADMIN — SODIUM CHLORIDE 1000 MILLILITER(S): 9 INJECTION INTRAMUSCULAR; INTRAVENOUS; SUBCUTANEOUS at 15:02

## 2022-12-30 RX ADMIN — DIATRIZOATE MEGLUMINE 30 MILLILITER(S): 180 INJECTION, SOLUTION INTRAVESICAL at 17:11

## 2022-12-30 RX ADMIN — ONDANSETRON 4 MILLIGRAM(S): 8 TABLET, FILM COATED ORAL at 15:02

## 2022-12-30 RX ADMIN — MORPHINE SULFATE 4 MILLIGRAM(S): 50 CAPSULE, EXTENDED RELEASE ORAL at 15:32

## 2022-12-30 RX ADMIN — MORPHINE SULFATE 4 MILLIGRAM(S): 50 CAPSULE, EXTENDED RELEASE ORAL at 16:02

## 2022-12-30 RX ADMIN — CEFTRIAXONE 100 MILLIGRAM(S): 500 INJECTION, POWDER, FOR SOLUTION INTRAMUSCULAR; INTRAVENOUS at 19:23

## 2022-12-30 NOTE — ED PROVIDER NOTE - NS ED ATTENDING STATEMENT MOD
This was a shared visit with the JOSELIN. I reviewed and verified the documentation and independently performed the documented:

## 2022-12-30 NOTE — ED ADULT TRIAGE NOTE - HEIGHT IN CM
Patient with DDDR pacer, remote transmission reviewed today documented 10 labeled ATR events 3/30-4/18/22,longest event 2 hours 16 minutes with average A/V rates 307/112 BPM  Patents AT/AF burden 1 %  Patient with history of PAF, not on anticoagulation                                                  
Spoke to patient about atrial fibrillation being documented on recent device checks.  Will need to discuss anticoagulation.  I will see him in [facility] ASAP.  
170.18

## 2022-12-30 NOTE — ED ADULT NURSE NOTE - OBJECTIVE STATEMENT
Patient complaints of abdominal pain for 4 days with nausea.  Patient denies SOB, CP, cough, HA, dizziness, N/V/D at this time.  Patient is alert and oriented, A&O4.  Family member is on bedside.

## 2022-12-30 NOTE — ED PROVIDER NOTE - PATIENT PORTAL LINK FT
You can access the FollowMyHealth Patient Portal offered by [facility] by registering at the following website: [email or web address]. By joining [employer]’s FollowMyHealth portal, you will also be able to view your health information using other applications (apps) compatible with our system.

## 2022-12-30 NOTE — ED ADULT NURSE NOTE - SUICIDE SCREENING QUESTION 3
Problem: Goal Outcome Summary  Goal: Goal Outcome Summary  Outcome: Improving  Problem: Goal Outcome Summary  Goal: Goal Outcome Summary  Outcome: Improving  Pt denies an shortness of air or chest discomfort. Pt remains steady on feet as tolerated in room patient plans on giving self Lovenox at home -felt he will do well with it. .    Plan on discharge after Dr Jerry has patient visit with wife at bedside.   1330 -patient's wife at bedside- Dr Jerry informed.    No

## 2022-12-30 NOTE — ED PROVIDER NOTE - CARE PLAN
1 Principal Discharge DX:	Upper abdominal pain   Principal Discharge DX:	Upper abdominal pain  Secondary Diagnosis:	Urinary tract infection

## 2022-12-30 NOTE — ED ADULT NURSE NOTE - CINV DISCH TEACH PARTICIP
Department of Anesthesiology  Postprocedure Note    Patient: Beverly Nieves  MRN: [id number]  YOB: 1951  Date of evaluation: 9/15/2021  Time:  4:40 PM     Procedure Summary     Date: 09/15/21 Room / Location: [address] [place] [address]    Anesthesia Start: 9657 Anesthesia Stop: 1628    Procedures:       EGD BIOPSY      EGD W/EUS FNA Diagnosis: (FUNDUS NODULE)    Surgeons: Will Viramontes MD Responsible Provider: Ernst Solis MD    Anesthesia Type: MAC ASA Status: 3          Anesthesia Type: MAC    Jarad Phase I: Jarad Score: 10    Jarad Phase II: Jarad Score: 9    Last vitals: Reviewed and per EMR flowsheets.        Anesthesia Post Evaluation    Patient location during evaluation: PACU  Patient participation: complete - patient participated  Level of consciousness: awake and alert  Pain score: 0  Airway patency: patent  Nausea & Vomiting: no vomiting and no nausea  Complications: no  Cardiovascular status: hemodynamically stable  Respiratory status: acceptable  Hydration status: stable
Patient/Spouse

## 2022-12-30 NOTE — ED PROVIDER NOTE - PHYSICAL EXAMINATION
VITAL SIGNS: I have reviewed nursing notes and confirm.  CONSTITUTIONAL: Well-developed; in no acute distress.   SKIN:  warm and dry, no acute rash.   HEAD:  normocephalic, atraumatic.  EYES: PERRL, EOM intact; conjunctiva and sclera clear.  ENT: No nasal discharge; airway clear.   NECK: Supple; non tender.  CARD: S1, S2 normal; no murmurs, gallops, or rubs. Regular rate and rhythm.   RESP:  Clear to auscultation b/l, no wheezes, rales or rhonchi.  ABD: Normal bowel sounds; soft; non-distended; non-tender; no guarding/ rebound. No CVA tenderness.   EXT: Normal ROM. No clubbing, cyanosis or edema. 2+ pulses to b/l ue/le.  NEURO: Alert, oriented, grossly unremarkable  PSYCH: Cooperative, mood and affect appropriate.

## 2022-12-30 NOTE — ED ADULT NURSE NOTE - NSIMPLEMENTINTERV_GEN_ALL_ED
Implemented All Universal Safety Interventions:  [place] to call system. Call bell, personal items and telephone within reach. Instruct patient to call for assistance. Room bathroom lighting operational. Non-slip footwear when patient is off stretcher. Physically safe environment: no spills, clutter or unnecessary equipment. Stretcher in lowest position, wheels locked, appropriate side rails in place.

## 2022-12-30 NOTE — ED PROVIDER NOTE - OBJECTIVE STATEMENT
78yo male with pmhx of prostate ca in remission, indwelling martinez catheter presents with 4d of epigastric pain and nausea. Pt denies fever, chills, chest pain, shortness of breath, vomiting, diarrhea, constipation, urinary symptoms. He denies h/o prior abdominal surgeries. He has not taken any medications for his symptoms.

## 2022-12-30 NOTE — ED PROVIDER NOTE - NSFOLLOWUPINSTRUCTIONS_ED_ALL_ED_FT
Dolor abdominal en los adultos    Abdominal Pain, Adult      El dolor en el abdomen (dolor abdominal) puede tener muchas causas. A menudo, el dolor abdominal no es grave y mejora sin tratamiento o con tratamiento en la casa. Sin embargo, a veces el dolor abdominal es grave.    El médico le hará preguntas sobre andree antecedentes médicos y le hará un examen físico para tratar de determinar la causa del dolor abdominal.      Siga estas instrucciones en [facility] casa:     Medicamentos     •Use los medicamentos de venta albin y los recetados solamente kevin se lo haya indicado el médico.      • No tome un laxante a menos que se lo haya indicado el médico.      Instrucciones generales     •Controle parson afección para detectar cualquier cambio.      •Damaris suficiente líquido kevin para mantener la orina de color amarillo pálido.      •Concurra a todas las visitas de seguimiento kevin se lo haya indicado el médico. [place] es importante.        Comuníquese con un médico si:    •El dolor abdominal cambia o empeora.      •No tiene apetito o baja de peso sin proponérselo.      •Está estreñido o tiene diarrea marva más de 2 o 3 días.      •Tiene dolor cuando orina o defeca.      •El dolor abdominal lo despierta de noche.      •El dolor empeora con las comidas, después de comer o con determinados alimentos.      •Tiene vómitos y no puede retener nada de lo que ingiere.      •Tiene fiebre.      •Observa gualberto en la orina.        Solicite ayuda inmediatamente si:    •El dolor no desaparece tan pronto kevin el médico le dijo que era esperable.      •No puede dejar de vomitar.      •El dolor se siente solo en zonas del abdomen, kevin el lado derecho o la parte inferior izquierda del abdomen. Si se localiza en la jean pierre derecha, posiblemente podría tratarse de apendicitis.      •Las heces son sanguinolentas o de color kev, o de aspecto alquitranado.      •Tiene dolor intenso, cólicos o distensión abdominal.    •Tiene signos de deshidratación, kevin los siguientes:  •Orina de color oscuro, muy escasa o falta de orina.      •Labios agrietados.      •Sequedad de boca.      •Ojos hundidos.      •Somnolencia.      •Debilidad.        •Tiene dificultad para respirar o dolor en el pecho.        Resumen    •A menudo, el dolor abdominal no es grave y mejora sin tratamiento o con tratamiento en la casa. Sin embargo, a veces el dolor abdominal es grave.      •Controle parson afección para detectar cualquier cambio.      •Use los medicamentos de venta albin y los recetados solamente kevin se lo haya indicado el médico.      •Comuníquese con un médico si el dolor abdominal cambia o empeora.      •Busque ayuda de inmediato si tiene dolor intenso, cólicos o distensión abdominal.      Esta información no tiene kevin fin reemplazar el consejo del médico. Asegúrese de hacerle al médico cualquier pregunta que tenga. TAKE ONE TAB OF CEFPODOXIME TWICE DAILY FOR 7 DAYS.  Follow up with your primary care doctor for reevaluation early next week.    Return to the Emergency Department if you develop fever>100.4F, worsening abdominal pain, vomiting, inability to urinate or any other concerns.     Dolor abdominal en los adultos    Abdominal Pain, Adult      El dolor en el abdomen (dolor abdominal) puede tener muchas causas. A menudo, el dolor abdominal no es grave y mejora sin tratamiento o con tratamiento en la casa. Sin embargo, a veces el dolor abdominal es grave.    El médico le hará preguntas sobre andree antecedentes médicos y le hará un examen físico para tratar de determinar la causa del dolor abdominal.      Siga estas instrucciones en [facility] casa:     Medicamentos     •Use los medicamentos de venta albin y los recetados solamente kevin se lo haya indicado el médico.      • No tome un laxante a menos que se lo haya indicado el médico.      Instrucciones generales     •Controle parson afección para detectar cualquier cambio.      •Damaris suficiente líquido kevin para mantener la orina de color amarillo pálido.      •Concurra a todas las visitas de seguimiento kevin se lo haya indicado el médico. [place] es importante.        Comuníquese con un médico si:    •El dolor abdominal cambia o empeora.      •No tiene apetito o baja de peso sin proponérselo.      •Está estreñido o tiene diarrea marva más de 2 o 3 días.      •Tiene dolor cuando orina o defeca.      •El dolor abdominal lo despierta de noche.      •El dolor empeora con las comidas, después de comer o con determinados alimentos.      •Tiene vómitos y no puede retener nada de lo que ingiere.      •Tiene fiebre.      •Observa gualberto en la orina.        Solicite ayuda inmediatamente si:    •El dolor no desaparece tan pronto kevin el médico le dijo que era esperable.      •No puede dejar de vomitar.      •El dolor se siente solo en zonas del abdomen, kevin el lado derecho o la parte inferior izquierda del abdomen. Si se localiza en la jean pierre derecha, posiblemente podría tratarse de apendicitis.      •Las heces son sanguinolentas o de color kev, o de aspecto alquitranado.      •Tiene dolor intenso, cólicos o distensión abdominal.    •Tiene signos de deshidratación, kevin los siguientes:  •Orina de color oscuro, muy escasa o falta de orina.      •Labios agrietados.      •Sequedad de boca.      •Ojos hundidos.      •Somnolencia.      •Debilidad.        •Tiene dificultad para respirar o dolor en el pecho.        Resumen    •A menudo, el dolor abdominal no es grave y mejora sin tratamiento o con tratamiento en la casa. Sin embargo, a veces el dolor abdominal es grave.      •Controle parson afección para detectar cualquier cambio.      •Use los medicamentos de venta albin y los recetados solamente kevin se lo haya indicado el médico.      •Comuníquese con un médico si el dolor abdominal cambia o empeora.      •Busque ayuda de inmediato si tiene dolor intenso, cólicos o distensión abdominal.      Esta información no tiene kevin fin reemplazar el consejo del médico. Asegúrese de hacerle al médico cualquier pregunta que tenga. TAKE ONE TAB OF CEFPODOXIME TWICE DAILY FOR 10 DAYS.  Follow up with your primary care doctor for reevaluation early next week.    Return to the Emergency Department if you develop fever>100.4F, worsening abdominal pain, vomiting, inability to urinate or any other concerns.     Dolor abdominal en los adultos    Abdominal Pain, Adult      El dolor en el abdomen (dolor abdominal) puede tener muchas causas. A menudo, el dolor abdominal no es grave y mejora sin tratamiento o con tratamiento en la casa. Sin embargo, a veces el dolor abdominal es grave.    El médico le hará preguntas sobre andree antecedentes médicos y le hará un examen físico para tratar de determinar la causa del dolor abdominal.      Siga estas instrucciones en [place] casa:     Medicamentos     •Use los medicamentos de venta albin y los recetados solamente kevin se lo haya indicado el médico.      • No tome un laxante a menos que se lo haya indicado el médico.      Instrucciones generales     •Controle parson afección para detectar cualquier cambio.      •Damaris suficiente líquido kevin para mantener la orina de color amarillo pálido.      •Concurra a todas las visitas de seguimiento kevin se lo haya indicado el médico. [place] es importante.        Comuníquese con un médico si:    •El dolor abdominal cambia o empeora.      •No tiene apetito o baja de peso sin proponérselo.      •Está estreñido o tiene diarrea marva más de 2 o 3 días.      •Tiene dolor cuando orina o defeca.      •El dolor abdominal lo despierta de noche.      •El dolor empeora con las comidas, después de comer o con determinados alimentos.      •Tiene vómitos y no puede retener nada de lo que ingiere.      •Tiene fiebre.      •Observa gualberto en la orina.        Solicite ayuda inmediatamente si:    •El dolor no desaparece tan pronto kevin el médico le dijo que era esperable.      •No puede dejar de vomitar.      •El dolor se siente solo en zonas del abdomen, kevin el lado derecho o la parte inferior izquierda del abdomen. Si se localiza en la jean pierre derecha, posiblemente podría tratarse de apendicitis.      •Las heces son sanguinolentas o de color kev, o de aspecto alquitranado.      •Tiene dolor intenso, cólicos o distensión abdominal.    •Tiene signos de deshidratación, kevin los siguientes:  •Orina de color oscuro, muy escasa o falta de orina.      •Labios agrietados.      •Sequedad de boca.      •Ojos hundidos.      •Somnolencia.      •Debilidad.        •Tiene dificultad para respirar o dolor en el pecho.        Resumen    •A menudo, el dolor abdominal no es grave y mejora sin tratamiento o con tratamiento en la casa. Sin embargo, a veces el dolor abdominal es grave.      •Controle parson afección para detectar cualquier cambio.      •Use los medicamentos de venta albin y los recetados solamente kevin se lo haya indicado el médico.      •Comuníquese con un médico si el dolor abdominal cambia o empeora.      •Busque ayuda de inmediato si tiene dolor intenso, cólicos o distensión abdominal.      Esta información no tiene kevin fin reemplazar el consejo del médico. Asegúrese de hacerle al médico cualquier pregunta que tenga.

## 2022-12-30 NOTE — ED PROVIDER NOTE - CLINICAL SUMMARY MEDICAL DECISION MAKING FREE TEXT BOX
Afebrile and hemodynamically stable. He has a soft, non-tender abdomen on exam. CBC, CMP, lipase unremarkable. COVID neg. UA pending. Plan to obtain CTAP to r/o acute intra-abdominal abnormality. Will dispo pending imaging. Afebrile and hemodynamically stable. He has a soft, non-tender abdomen on exam. CBC, CMP, lipase unremarkable. COVID neg. UA nitrite pos with wbcs, pt has indwelling martinez, will treat with ceftriaxone IV and cefpoxodime 200mg bid x1wk. Plan to obtain CTAP to r/o acute intra-abdominal abnormality. Will dispo pending imaging. Afebrile and hemodynamically stable. He has a soft, non-tender abdomen on exam. CBC, CMP, lipase unremarkable. COVID neg. UA nitrite pos with wbcs, pt has indwelling martinez, will treat with ceftriaxone IV and cefpoxodime 200mg bid x10 days (treating for pyelonephritis due to flank pain). Plan to obtain CTAP to r/o acute intra-abdominal abnormality. Notable for bladder wall thickening, otherwise no acute changes since prior imaging. Discussed with pt and his family member at bedside. Will dc with pmd follow up.

## 2022-12-30 NOTE — ED PROVIDER NOTE - ATTENDING APP SHARED VISIT CONTRIBUTION OF CARE
77M with 4 days of epigastric/diffuse abdominal pain, given patient's age will r/o acute intraabdominal process ?sbo ?intussusception ?perforation ?hepatobiliary disease ?pancreatitis via CT imaging and labs.

## 2023-01-01 LAB
CULTURE RESULTS: SIGNIFICANT CHANGE UP
METHOD TYPE: SIGNIFICANT CHANGE UP
METHOD TYPE: SIGNIFICANT CHANGE UP
ORGANISM # SPEC MICROSCOPIC CNT: SIGNIFICANT CHANGE UP
SPECIMEN SOURCE: SIGNIFICANT CHANGE UP

## 2023-01-06 ENCOUNTER — EMERGENCY (EMERGENCY)
Facility: HOSPITAL | Age: 78
LOS: 1 days | Discharge: HOME | End: 2023-01-06
Attending: EMERGENCY MEDICINE | Admitting: EMERGENCY MEDICINE
Payer: MEDICARE

## 2023-01-06 VITALS
HEART RATE: 66 BPM | OXYGEN SATURATION: 98 % | TEMPERATURE: 98 F | RESPIRATION RATE: 18 BRPM | DIASTOLIC BLOOD PRESSURE: 80 MMHG | SYSTOLIC BLOOD PRESSURE: 168 MMHG

## 2023-01-06 VITALS
SYSTOLIC BLOOD PRESSURE: 179 MMHG | WEIGHT: 128.09 LBS | HEIGHT: 67 IN | DIASTOLIC BLOOD PRESSURE: 100 MMHG | RESPIRATION RATE: 18 BRPM | TEMPERATURE: 98 F | OXYGEN SATURATION: 97 % | HEART RATE: 77 BPM

## 2023-01-06 DIAGNOSIS — Z86.010 PERSONAL HISTORY OF COLONIC POLYPS: ICD-10-CM

## 2023-01-06 DIAGNOSIS — Z87.39 PERSONAL HISTORY OF OTHER DISEASES OF THE MUSCULOSKELETAL SYSTEM AND CONNECTIVE TISSUE: ICD-10-CM

## 2023-01-06 DIAGNOSIS — Z85.46 PERSONAL HISTORY OF MALIGNANT NEOPLASM OF PROSTATE: ICD-10-CM

## 2023-01-06 DIAGNOSIS — R10.9 UNSPECIFIED ABDOMINAL PAIN: ICD-10-CM

## 2023-01-06 DIAGNOSIS — K21.9 GASTRO-ESOPHAGEAL REFLUX DISEASE WITHOUT ESOPHAGITIS: ICD-10-CM

## 2023-01-06 DIAGNOSIS — I10 ESSENTIAL (PRIMARY) HYPERTENSION: ICD-10-CM

## 2023-01-06 DIAGNOSIS — Z96.659 PRESENCE OF UNSPECIFIED ARTIFICIAL KNEE JOINT: Chronic | ICD-10-CM

## 2023-01-06 DIAGNOSIS — Z86.39 PERSONAL HISTORY OF OTHER ENDOCRINE, NUTRITIONAL AND METABOLIC DISEASE: ICD-10-CM

## 2023-01-06 DIAGNOSIS — K57.90 DIVERTICULOSIS OF INTESTINE, PART UNSPECIFIED, WITHOUT PERFORATION OR ABSCESS WITHOUT BLEEDING: ICD-10-CM

## 2023-01-06 DIAGNOSIS — Z96.659 PRESENCE OF UNSPECIFIED ARTIFICIAL KNEE JOINT: ICD-10-CM

## 2023-01-06 DIAGNOSIS — I73.9 PERIPHERAL VASCULAR DISEASE, UNSPECIFIED: ICD-10-CM

## 2023-01-06 LAB
ALBUMIN SERPL ELPH-MCNC: 3.7 G/DL — SIGNIFICANT CHANGE UP (ref 3.3–5)
ALP SERPL-CCNC: 145 U/L — HIGH (ref 40–120)
ALT FLD-CCNC: SIGNIFICANT CHANGE UP (ref 10–45)
ANION GAP SERPL CALC-SCNC: 9 MMOL/L — SIGNIFICANT CHANGE UP (ref 5–17)
APPEARANCE UR: CLEAR — SIGNIFICANT CHANGE UP
AST SERPL-CCNC: SIGNIFICANT CHANGE UP (ref 10–40)
BACTERIA # UR AUTO: ABNORMAL /HPF
BASOPHILS # BLD AUTO: 0.04 K/UL — SIGNIFICANT CHANGE UP (ref 0–0.2)
BASOPHILS NFR BLD AUTO: 0.7 % — SIGNIFICANT CHANGE UP (ref 0–2)
BILIRUB SERPL-MCNC: 0.2 MG/DL — SIGNIFICANT CHANGE UP (ref 0.2–1.2)
BILIRUB UR-MCNC: NEGATIVE — SIGNIFICANT CHANGE UP
BUN SERPL-MCNC: 13 MG/DL — SIGNIFICANT CHANGE UP (ref 7–23)
CALCIUM SERPL-MCNC: 8.7 MG/DL — SIGNIFICANT CHANGE UP (ref 8.4–10.5)
CHLORIDE SERPL-SCNC: 103 MMOL/L — SIGNIFICANT CHANGE UP (ref 96–108)
CO2 SERPL-SCNC: 25 MMOL/L — SIGNIFICANT CHANGE UP (ref 22–31)
COLOR SPEC: YELLOW — SIGNIFICANT CHANGE UP
COMMENT - URINE: SIGNIFICANT CHANGE UP
CREAT SERPL-MCNC: 0.78 MG/DL — SIGNIFICANT CHANGE UP (ref 0.5–1.3)
DIFF PNL FLD: ABNORMAL
EGFR: 92 ML/MIN/1.73M2 — SIGNIFICANT CHANGE UP
EOSINOPHIL # BLD AUTO: 0.23 K/UL — SIGNIFICANT CHANGE UP (ref 0–0.5)
EOSINOPHIL NFR BLD AUTO: 4 % — SIGNIFICANT CHANGE UP (ref 0–6)
EPI CELLS # UR: SIGNIFICANT CHANGE UP /HPF (ref 0–5)
GLUCOSE SERPL-MCNC: 90 MG/DL — SIGNIFICANT CHANGE UP (ref 70–99)
GLUCOSE UR QL: NEGATIVE — SIGNIFICANT CHANGE UP
HCT VFR BLD CALC: 33.1 % — LOW (ref 39–50)
HGB BLD-MCNC: 10.7 G/DL — LOW (ref 13–17)
IMM GRANULOCYTES NFR BLD AUTO: 0.3 % — SIGNIFICANT CHANGE UP (ref 0–0.9)
KETONES UR-MCNC: NEGATIVE — SIGNIFICANT CHANGE UP
LEUKOCYTE ESTERASE UR-ACNC: ABNORMAL
LYMPHOCYTES # BLD AUTO: 1.32 K/UL — SIGNIFICANT CHANGE UP (ref 1–3.3)
LYMPHOCYTES # BLD AUTO: 23 % — SIGNIFICANT CHANGE UP (ref 13–44)
MCHC RBC-ENTMCNC: 30.6 PG — SIGNIFICANT CHANGE UP (ref 27–34)
MCHC RBC-ENTMCNC: 32.3 GM/DL — SIGNIFICANT CHANGE UP (ref 32–36)
MCV RBC AUTO: 94.6 FL — SIGNIFICANT CHANGE UP (ref 80–100)
MONOCYTES # BLD AUTO: 0.57 K/UL — SIGNIFICANT CHANGE UP (ref 0–0.9)
MONOCYTES NFR BLD AUTO: 9.9 % — SIGNIFICANT CHANGE UP (ref 2–14)
NEUTROPHILS # BLD AUTO: 3.56 K/UL — SIGNIFICANT CHANGE UP (ref 1.8–7.4)
NEUTROPHILS NFR BLD AUTO: 62.1 % — SIGNIFICANT CHANGE UP (ref 43–77)
NITRITE UR-MCNC: POSITIVE
NRBC # BLD: 0 /100 WBCS — SIGNIFICANT CHANGE UP (ref 0–0)
PH UR: 8 — SIGNIFICANT CHANGE UP (ref 5–8)
PLATELET # BLD AUTO: 303 K/UL — SIGNIFICANT CHANGE UP (ref 150–400)
POTASSIUM SERPL-MCNC: SIGNIFICANT CHANGE UP (ref 3.5–5.3)
POTASSIUM SERPL-SCNC: SIGNIFICANT CHANGE UP (ref 3.5–5.3)
PROT SERPL-MCNC: 6.7 G/DL — SIGNIFICANT CHANGE UP (ref 6–8.3)
PROT UR-MCNC: ABNORMAL MG/DL
RBC # BLD: 3.5 M/UL — LOW (ref 4.2–5.8)
RBC # FLD: 14.8 % — HIGH (ref 10.3–14.5)
RBC CASTS # UR COMP ASSIST: < 5 /HPF — SIGNIFICANT CHANGE UP
SODIUM SERPL-SCNC: 137 MMOL/L — SIGNIFICANT CHANGE UP (ref 135–145)
SP GR SPEC: 1.01 — SIGNIFICANT CHANGE UP (ref 1–1.03)
UROBILINOGEN FLD QL: 0.2 E.U./DL — SIGNIFICANT CHANGE UP
WBC # BLD: 5.74 K/UL — SIGNIFICANT CHANGE UP (ref 3.8–10.5)
WBC # FLD AUTO: 5.74 K/UL — SIGNIFICANT CHANGE UP (ref 3.8–10.5)
WBC UR QL: ABNORMAL /HPF

## 2023-01-06 PROCEDURE — 74176 CT ABD & PELVIS W/O CONTRAST: CPT | Mod: MA

## 2023-01-06 PROCEDURE — 99285 EMERGENCY DEPT VISIT HI MDM: CPT | Mod: 25

## 2023-01-06 PROCEDURE — 80053 COMPREHEN METABOLIC PANEL: CPT

## 2023-01-06 PROCEDURE — 74176 CT ABD & PELVIS W/O CONTRAST: CPT | Mod: 26,MA

## 2023-01-06 PROCEDURE — 81001 URINALYSIS AUTO W/SCOPE: CPT

## 2023-01-06 PROCEDURE — 93005 ELECTROCARDIOGRAM TRACING: CPT

## 2023-01-06 PROCEDURE — 87086 URINE CULTURE/COLONY COUNT: CPT

## 2023-01-06 PROCEDURE — 85025 COMPLETE CBC W/AUTO DIFF WBC: CPT

## 2023-01-06 PROCEDURE — 87186 SC STD MICRODIL/AGAR DIL: CPT

## 2023-01-06 PROCEDURE — 99284 EMERGENCY DEPT VISIT MOD MDM: CPT

## 2023-01-06 PROCEDURE — 36415 COLL VENOUS BLD VENIPUNCTURE: CPT

## 2023-01-06 RX ORDER — TRAMADOL HYDROCHLORIDE 50 MG/1
50 TABLET ORAL ONCE
Refills: 0 | Status: DISCONTINUED | OUTPATIENT
Start: 2023-01-06 | End: 2023-01-06

## 2023-01-06 RX ORDER — LIDOCAINE 4 G/100G
1 CREAM TOPICAL ONCE
Refills: 0 | Status: COMPLETED | OUTPATIENT
Start: 2023-01-06 | End: 2023-01-06

## 2023-01-06 RX ADMIN — TRAMADOL HYDROCHLORIDE 50 MILLIGRAM(S): 50 TABLET ORAL at 17:54

## 2023-01-06 RX ADMIN — LIDOCAINE 1 PATCH: 4 CREAM TOPICAL at 17:54

## 2023-01-06 NOTE — ED ADULT NURSE NOTE - OBJECTIVE STATEMENT
Pt received aaox3 c/o right sided flank pain for 2x wks. Pt c/o worsening nausea. Pt seen here for same cc on 12/30. Pt has catheter in place on arrival. Pt endorses regular BM, denies any vomiting or diarrhea, CP, SOB, fever, or chills.

## 2023-01-06 NOTE — ED PROVIDER NOTE - NS ED ROS FT
Constitutional: No fever or chills.   Cardiac: No chest pain, SOB or edema. No chest pain with exertion.  GI: No nausea, vomiting, diarrhea or abdominal pain.  : No dysuria, frequency or burning. See HPI  MS: No midline back pain.  Neuro: No numbness or weakness.   Skin: No skin rash.   Except as documented in the HPI, all other systems are negative.

## 2023-01-06 NOTE — ED ADULT TRIAGE NOTE - CHIEF COMPLAINT QUOTE
Patient c/o b/l flank pain and abdominal pain x1 week. Patient was here 12/30/22 and states "the pain is the same."

## 2023-01-06 NOTE — ED PROVIDER NOTE - PROGRESS NOTE DETAILS
No acute findings. Recommend placing to leg bag for regular drainage. Continue abx, f/u w/ his Urologist No acute findings. Recommend placing to leg bag for regular drainage. Continue abx, f/u w/ his Urologist. Leg bag applied. Pt demonstrates understanding of plan

## 2023-01-06 NOTE — ED PROVIDER NOTE - PATIENT PORTAL LINK FT
You can access the FollowMyHealth Patient Portal offered by [facility] by registering at the following website: [email or web address]. By joining [employer]’s FollowMyHealth portal, you will also be able to view your health information using other applications (apps) compatible with our system.

## 2023-01-06 NOTE — ED PROVIDER NOTE - NSFOLLOWUPINSTRUCTIONS_ED_ALL_ED_FT
Your Dawkins catheter has been placed to a leg drainage bag. Your CT today and on 12/30 shows chronic bladder outlet obstruction. The constant drainage should help relieve this. Continue all your medications as prescribed, including the antibiotics previously prescribed, and see your Urologist for follow up.     Mariee catéter de Dawkins gracia sido colocado en perry bolsa de drenaje de la pierna. Mariee tomografía computarizada de hoy y el 30/12 muestra perry obstrucción crónica de la salida de la vejiga. El drenaje jyothi debería ayudar a aliviar esto. Continúe con todos andree medicamentos según lo prescrito, incluidos los antibióticos y lo que le recetó previamente, Urólogo para erik seguimiento.      Dolor en la dolor en la fosa lumbar en adultos    Flank Pain, Adult      El dolor en la fosa lumbar es aquel dolor que se localiza en un lado del cuerpo, entre la parte superior del abdomen y columna. Esta área se llama flanco. El dolor puede durar poco tiempo (jose) o puede durar mucho tiempo o ser recurrente (crónico). Puede ser leve o intenso. Puede tener diferentes causas, kevin las siguientes:  •Dolor o lesión muscular.      •Infección renal, cálculos renales o enfermedad renal.      •Estrés.      •Problemas en la columna vertebral (enfermedad de los discos).      •Perry infección pulmonar (neumonía).      •Líquido en los pulmones (edema pulmonar).      •Perry erupción cutánea causada por el virus de la varicela (culebrilla).      •Tumores que afectan la parte posterior del abdomen.      •Enfermedad de la vesícula biliar.        Siga estas instrucciones en mariee casa:  A comparison of three sample cups showing dark yellow, yellow, and pale yellow urine.   •Damaris suficiente líquido kevin para mantener la orina de color amarillo pálido.      •[place]ael reposo kevin se lo haya indicado el médico.      •Use los medicamentos de venta albin y los recetados solamente kevin se lo haya indicado el médico.      •Realice un seguimiento por escrito de lo que le provocó el dolor en la fosa lumbar y lo que lo alivió.      •Concurra a todas las visitas de seguimiento. [place] es importante.        Comuníquese con un médico si:    •El dolor no se mo con los medicamentos.      •Aparecen nuevos síntomas.      •El dolor empeora.      •Los síntomas robles más de 2 a 3 días.      •Tiene dificultad para orinar u orina con mucha frecuencia.        Solicite ayuda de inmediato si:    •Tiene dificultad para respirar o siente que le falta el aire.      •Le duele el abdomen, o patricio está hinchado o enrojecido.      •Tiene náuseas o vómitos.      •Siente que podría desmayarse o se desmaya.      •Observa gualberto en la orina.      •Tiene dolor en la fosa lumbar y fiebre.      Estos síntomas pueden representar un problema grave que constituye perry emergencia. No espere a raymond si los síntomas desaparecen. Solicite atención médica de inmediato. Comuníquese con el servicio de emergencias de mariee localidad (911 en los [place]). No conduzca por andree propios medios hasta el hospital.       Resumen    •El dolor en la fosa lumbar es aquel dolor que se localiza en un lado del cuerpo, entre la parte superior del abdomen y columna.      •El dolor puede durar poco tiempo (jose) o puede durar mucho tiempo o ser recurrente (crónico). Puede ser leve o intenso.      •Puede tener diferentes causas.      •Comuníquese con mariee médico si los síntomas empeoran o si robles más de 2 a 3 días.      Esta información no tiene kevin fin reemplazar el consejo del médico. Asegúrese de hacerle al médico cualquier pregunta que tenga.

## 2023-01-06 NOTE — ED PROVIDER NOTE - PHYSICAL EXAMINATION
Constitutional: Well appearing, awake, alert, oriented to person, place, time/situation and in no apparent distress.  ENMT: Airway patent. Normal MM  Eyes: Clear bilaterally  Cardiac: Normal rate, regular rhythm.  Heart sounds S1, S2.  No murmurs, rubs or gallops.  Respiratory: Breaths sounds equal and clear b/l. No increased WOB, tachypnea, hypoxia, or accessory mm use. Pt speaks in full sentences.   Gastrointestinal: Abd soft, NT, ND, NABS. No guarding, rebound, or rigidity. No pulsatile abdominal masses. No organomegaly appreciated. No CVAT   Musculoskeletal: Range of motion is not limited. no midline spinal ttp. + mild L paraspinal mm ttp  Neuro: Alert and oriented x 3, face symmetric and speech fluent. Strength 5/5 x 4 ext and symmetric, nml gross motor movement, nml gait. No focal deficits noted.  Skin: Skin normal color for race, warm, dry and intact. No evidence of rash.  Psych: Alert and oriented to person, place, time/situation. normal mood and affect. no apparent risk to self or others. Constitutional: Well appearing, awake, alert, oriented to person, place, time/situation and in no apparent distress.  ENMT: Airway patent. Normal MM  Eyes: Clear bilaterally  Cardiac: Normal rate, regular rhythm.  Heart sounds S1, S2.  No murmurs, rubs or gallops.  Respiratory: Breaths sounds equal and clear b/l. No increased WOB, tachypnea, hypoxia, or accessory mm use. Pt speaks in full sentences.   Gastrointestinal: Abd soft, NT, ND, NABS. No guarding, rebound, or rigidity. No pulsatile abdominal masses. No organomegaly appreciated. No CVAT   : Dawkins catheter present w/ cap placed in proximal port, no drainage bag   Musculoskeletal: Range of motion is not limited. no midline spinal ttp. + mild L paraspinal mm ttp  Neuro: Alert and oriented x 3, face symmetric and speech fluent. Strength 5/5 x 4 ext and symmetric, nml gross motor movement, nml gait. No focal deficits noted.  Skin: Skin normal color for race, warm, dry and intact. No evidence of rash.  Psych: Alert and oriented to person, place, time/situation. normal mood and affect. no apparent risk to self or others.

## 2023-01-06 NOTE — ED PROVIDER NOTE - OBJECTIVE STATEMENT
Pt presents w/ b/l flank pain, L > R. The L is constant, the R is intermittent. No abd pain. He is eating normally. No V/D/C. Last BM this morning, normal. He has chronic indwelling Dawkins which is changed monthly, last change 12/18. He states his Urologist allows to place a cap in the Dawkins, instead of constantly draining it, and does not have a drainage bag. He denies changes in color or clarity of urine. No odor. No f/c. He was in this ED on 12/30 for similar sx. He is currently on abx from prior visit, cx contaminated, likely chronic bacterial colonization Pt w/ PMHx HTN, prostate CA, presents w/ b/l flank pain, L > R. The L is constant, the R is intermittent. The pain is worse w/ movement. No abd pain. He is eating normally. No V/D/C. Last BM this morning, normal. He has chronic indwelling Dawkins which is changed monthly, last changed 12/18. He states his Urologist allows to place a cap in the Dawkins, instead of constantly draining it, and does not have a drainage bag. He denies changes in color or clarity of urine. No odor. No f/c. He was in this ED on 12/30 for similar sx. He is currently on abx from prior visit, cx contaminated, likely chronic bacterial colonization. He had CT at that time showing chronic bladder outlet obstruction. He has not since f/u w/ his Urologist, but has an appt in approx 12 days. His family reports recently rising PSA and states his medications have been changed, but they are uncertain what he is currently on, outside of Flomax

## 2023-01-06 NOTE — ED PROVIDER NOTE - CLINICAL SUMMARY MEDICAL DECISION MAKING FREE TEXT BOX
Pt p/w persistent sx of flank pain, L > R. Recent visit reviewed, chronic bladder outlet obstruction, likely 2/2 pt having Dawkins capped, rather than constantly draining. UA +, but likely chronic colonization as obtained from Dawkins, on abx. No f/c. no abd pain, nor GI complaints, tolerating PO. Benign non tender abd. No CVAT. Pt inquires about kidney stones, and advised not visualized on recent CT, and less likely. DDx includes but not limited to hydro 2/2 outlet obstruction, MSK pain, less likely ureteral colic, pyelo, other pathology. Check labs, CT non, analgesia. Dispo pending w/u and clinical status

## 2023-01-08 LAB
-  AMPICILLIN: SIGNIFICANT CHANGE UP
-  AZTREONAM: SIGNIFICANT CHANGE UP
-  CEFEPIME: SIGNIFICANT CHANGE UP
-  CIPROFLOXACIN: SIGNIFICANT CHANGE UP
-  CIPROFLOXACIN: SIGNIFICANT CHANGE UP
-  GENTAMICIN: SIGNIFICANT CHANGE UP
-  LEVOFLOXACIN: SIGNIFICANT CHANGE UP
-  NITROFURANTOIN: SIGNIFICANT CHANGE UP
-  PIPERACILLIN/TAZOBACTAM: SIGNIFICANT CHANGE UP
-  TETRACYCLINE: SIGNIFICANT CHANGE UP
-  TOBRAMYCIN: SIGNIFICANT CHANGE UP
-  VANCOMYCIN: SIGNIFICANT CHANGE UP
CULTURE RESULTS: SIGNIFICANT CHANGE UP
METHOD TYPE: SIGNIFICANT CHANGE UP
METHOD TYPE: SIGNIFICANT CHANGE UP
ORGANISM # SPEC MICROSCOPIC CNT: SIGNIFICANT CHANGE UP
SPECIMEN SOURCE: SIGNIFICANT CHANGE UP

## 2023-03-02 NOTE — ED ADULT NURSE NOTE - BRAND OF COVID-19 VACCINATION
Patient to room with c/o pain and frequency with urination beginning yesterday. Urine specimen obtained.
Moderna dose 1 and 2

## 2023-04-22 ENCOUNTER — EMERGENCY (EMERGENCY)
Facility: HOSPITAL | Age: 78
LOS: 1 days | Discharge: HOME | End: 2023-04-22
Attending: STUDENT IN AN ORGANIZED HEALTH CARE EDUCATION/TRAINING PROGRAM | Admitting: STUDENT IN AN ORGANIZED HEALTH CARE EDUCATION/TRAINING PROGRAM
Payer: MEDICARE

## 2023-04-22 VITALS
DIASTOLIC BLOOD PRESSURE: 79 MMHG | HEIGHT: 67 IN | RESPIRATION RATE: 16 BRPM | HEART RATE: 80 BPM | TEMPERATURE: 98 F | WEIGHT: 121.25 LBS | OXYGEN SATURATION: 96 % | SYSTOLIC BLOOD PRESSURE: 115 MMHG

## 2023-04-22 VITALS
RESPIRATION RATE: 18 BRPM | HEART RATE: 76 BPM | DIASTOLIC BLOOD PRESSURE: 75 MMHG | SYSTOLIC BLOOD PRESSURE: 121 MMHG | OXYGEN SATURATION: 96 % | TEMPERATURE: 98 F

## 2023-04-22 DIAGNOSIS — Z96.659 PRESENCE OF UNSPECIFIED ARTIFICIAL KNEE JOINT: Chronic | ICD-10-CM

## 2023-04-22 DIAGNOSIS — Z85.46 PERSONAL HISTORY OF MALIGNANT NEOPLASM OF PROSTATE: ICD-10-CM

## 2023-04-22 DIAGNOSIS — R30.0 DYSURIA: ICD-10-CM

## 2023-04-22 DIAGNOSIS — N39.0 URINARY TRACT INFECTION, SITE NOT SPECIFIED: ICD-10-CM

## 2023-04-22 DIAGNOSIS — I10 ESSENTIAL (PRIMARY) HYPERTENSION: ICD-10-CM

## 2023-04-22 LAB
ANION GAP SERPL CALC-SCNC: 8 MMOL/L — SIGNIFICANT CHANGE UP (ref 5–17)
APPEARANCE UR: ABNORMAL
BACTERIA # UR AUTO: ABNORMAL /HPF
BASOPHILS # BLD AUTO: 0.03 K/UL — SIGNIFICANT CHANGE UP (ref 0–0.2)
BASOPHILS NFR BLD AUTO: 0.6 % — SIGNIFICANT CHANGE UP (ref 0–2)
BILIRUB UR-MCNC: NEGATIVE — SIGNIFICANT CHANGE UP
BUN SERPL-MCNC: 12 MG/DL — SIGNIFICANT CHANGE UP (ref 7–23)
CALCIUM SERPL-MCNC: 8.7 MG/DL — SIGNIFICANT CHANGE UP (ref 8.4–10.5)
CHLORIDE SERPL-SCNC: 104 MMOL/L — SIGNIFICANT CHANGE UP (ref 96–108)
CO2 SERPL-SCNC: 24 MMOL/L — SIGNIFICANT CHANGE UP (ref 22–31)
COLOR SPEC: YELLOW — SIGNIFICANT CHANGE UP
COMMENT - URINE: SIGNIFICANT CHANGE UP
CREAT SERPL-MCNC: 0.81 MG/DL — SIGNIFICANT CHANGE UP (ref 0.5–1.3)
DIFF PNL FLD: ABNORMAL
EGFR: 91 ML/MIN/1.73M2 — SIGNIFICANT CHANGE UP
EOSINOPHIL # BLD AUTO: 0.16 K/UL — SIGNIFICANT CHANGE UP (ref 0–0.5)
EOSINOPHIL NFR BLD AUTO: 3 % — SIGNIFICANT CHANGE UP (ref 0–6)
EPI CELLS # UR: SIGNIFICANT CHANGE UP /HPF (ref 0–5)
GLUCOSE SERPL-MCNC: 96 MG/DL — SIGNIFICANT CHANGE UP (ref 70–99)
GLUCOSE UR QL: NEGATIVE — SIGNIFICANT CHANGE UP
HCT VFR BLD CALC: 35.2 % — LOW (ref 39–50)
HGB BLD-MCNC: 11.1 G/DL — LOW (ref 13–17)
IMM GRANULOCYTES NFR BLD AUTO: 0.4 % — SIGNIFICANT CHANGE UP (ref 0–0.9)
KETONES UR-MCNC: ABNORMAL MG/DL
LEUKOCYTE ESTERASE UR-ACNC: ABNORMAL
LYMPHOCYTES # BLD AUTO: 1.51 K/UL — SIGNIFICANT CHANGE UP (ref 1–3.3)
LYMPHOCYTES # BLD AUTO: 28.2 % — SIGNIFICANT CHANGE UP (ref 13–44)
MCHC RBC-ENTMCNC: 29.6 PG — SIGNIFICANT CHANGE UP (ref 27–34)
MCHC RBC-ENTMCNC: 31.5 GM/DL — LOW (ref 32–36)
MCV RBC AUTO: 93.9 FL — SIGNIFICANT CHANGE UP (ref 80–100)
MONOCYTES # BLD AUTO: 0.57 K/UL — SIGNIFICANT CHANGE UP (ref 0–0.9)
MONOCYTES NFR BLD AUTO: 10.6 % — SIGNIFICANT CHANGE UP (ref 2–14)
NEUTROPHILS # BLD AUTO: 3.07 K/UL — SIGNIFICANT CHANGE UP (ref 1.8–7.4)
NEUTROPHILS NFR BLD AUTO: 57.2 % — SIGNIFICANT CHANGE UP (ref 43–77)
NITRITE UR-MCNC: NEGATIVE — SIGNIFICANT CHANGE UP
NRBC # BLD: 0 /100 WBCS — SIGNIFICANT CHANGE UP (ref 0–0)
PH UR: 6.5 — SIGNIFICANT CHANGE UP (ref 5–8)
PLATELET # BLD AUTO: 356 K/UL — SIGNIFICANT CHANGE UP (ref 150–400)
POTASSIUM SERPL-MCNC: 3.9 MMOL/L — SIGNIFICANT CHANGE UP (ref 3.5–5.3)
POTASSIUM SERPL-SCNC: 3.9 MMOL/L — SIGNIFICANT CHANGE UP (ref 3.5–5.3)
PROT UR-MCNC: >=300 MG/DL
RBC # BLD: 3.75 M/UL — LOW (ref 4.2–5.8)
RBC # FLD: 18.2 % — HIGH (ref 10.3–14.5)
RBC CASTS # UR COMP ASSIST: < 5 /HPF — SIGNIFICANT CHANGE UP
SODIUM SERPL-SCNC: 136 MMOL/L — SIGNIFICANT CHANGE UP (ref 135–145)
SP GR SPEC: >=1.03 — SIGNIFICANT CHANGE UP (ref 1–1.03)
UROBILINOGEN FLD QL: 1 E.U./DL — SIGNIFICANT CHANGE UP
WBC # BLD: 5.36 K/UL — SIGNIFICANT CHANGE UP (ref 3.8–10.5)
WBC # FLD AUTO: 5.36 K/UL — SIGNIFICANT CHANGE UP (ref 3.8–10.5)
WBC UR QL: > 10 /HPF

## 2023-04-22 PROCEDURE — 51702 INSERT TEMP BLADDER CATH: CPT

## 2023-04-22 PROCEDURE — 87186 SC STD MICRODIL/AGAR DIL: CPT

## 2023-04-22 PROCEDURE — 80048 BASIC METABOLIC PNL TOTAL CA: CPT

## 2023-04-22 PROCEDURE — 96374 THER/PROPH/DIAG INJ IV PUSH: CPT | Mod: XU

## 2023-04-22 PROCEDURE — 81001 URINALYSIS AUTO W/SCOPE: CPT

## 2023-04-22 PROCEDURE — 85025 COMPLETE CBC W/AUTO DIFF WBC: CPT

## 2023-04-22 PROCEDURE — 99284 EMERGENCY DEPT VISIT MOD MDM: CPT | Mod: 25

## 2023-04-22 PROCEDURE — 99284 EMERGENCY DEPT VISIT MOD MDM: CPT

## 2023-04-22 PROCEDURE — 36415 COLL VENOUS BLD VENIPUNCTURE: CPT

## 2023-04-22 PROCEDURE — 87086 URINE CULTURE/COLONY COUNT: CPT

## 2023-04-22 RX ORDER — CEFTRIAXONE 500 MG/1
1000 INJECTION, POWDER, FOR SOLUTION INTRAMUSCULAR; INTRAVENOUS ONCE
Refills: 0 | Status: COMPLETED | OUTPATIENT
Start: 2023-04-22 | End: 2023-04-22

## 2023-04-22 RX ORDER — CEFPODOXIME PROXETIL 100 MG
1 TABLET ORAL
Qty: 20 | Refills: 0
Start: 2023-04-22 | End: 2023-05-01

## 2023-04-22 RX ADMIN — CEFTRIAXONE 100 MILLIGRAM(S): 500 INJECTION, POWDER, FOR SOLUTION INTRAMUSCULAR; INTRAVENOUS at 22:55

## 2023-04-22 NOTE — ED PROVIDER NOTE - PROGRESS NOTE DETAILS
urine with leuks and wbcs, given dose of ceftriaxone, martinez exchanged and draining well.  Pt stable for d/c, will rx cefpodoxime, to f/u with his pmd, urology, return to ED if having worsening symptoms

## 2023-04-22 NOTE — ED PROVIDER NOTE - NSFOLLOWUPINSTRUCTIONS_ED_ALL_ED_FT
Urinary Tract Infection, Adult    A urinary tract infection (UTI) is an infection of any part of the urinary tract. The urinary tract includes the kidneys, ureters, bladder, and urethra. These organs make, store, and get rid of urine in the body.    An upper UTI affects the ureters and kidneys. A lower UTI affects the bladder and urethra.    What are the causes?  Most urinary tract infections are caused by bacteria in your genital area around your urethra, where urine leaves your body. These bacteria grow and cause inflammation of your urinary tract.    What increases the risk?  You are more likely to develop this condition if:  You have a urinary catheter that stays in place.  You are not able to control when you urinate or have a bowel movement (incontinence).  You are female and you:  Use a spermicide or diaphragm for birth control.  Have low estrogen levels.  Are pregnant.  You have certain genes that increase your risk.  You are sexually active.  You take antibiotic medicines.  You have a condition that causes your flow of urine to slow down, such as:  An enlarged prostate, if you are male.  Blockage in your urethra.  A kidney stone.  A nerve condition that affects your bladder control (neurogenic bladder).  Not getting enough to drink, or not urinating often.  You have certain medical conditions, such as:  Diabetes.  A weak disease-fighting system (immunesystem).  Sickle cell disease.  Gout.  Spinal cord injury.  What are the signs or symptoms?  Symptoms of this condition include:  Needing to urinate right away (urgency).  Frequent urination. This may include small amounts of urine each time you urinate.  Pain or burning with urination.  Blood in the urine.  Urine that smells bad or unusual.  Trouble urinating.  Cloudy urine.  Vaginal discharge, if you are female.  Pain in the abdomen or the lower back.  You may also have:  Vomiting or a decreased appetite.  Confusion.  Irritability or tiredness.  A fever or chills.  Diarrhea.  The first symptom in older adults may be confusion. In some cases, they may not have any symptoms until the infection has worsened.    How is this diagnosed?  This condition is diagnosed based on your medical history and a physical exam. You may also have other tests, including:  Urine tests.  Blood tests.  Tests for STIs (sexually transmitted infections).  If you have had more than one UTI, a cystoscopy or imaging studies may be done to determine the cause of the infections.    How is this treated?  Treatment for this condition includes:  Antibiotic medicine.  Over-the-counter medicines to treat discomfort.  Drinking enough water to stay hydrated.  If you have frequent infections or have other conditions such as a kidney stone, you may need to see a health care provider who specializes in the urinary tract (urologist).    In rare cases, urinary tract infections can cause sepsis. Sepsis is a life-threatening condition that occurs when the body responds to an infection. Sepsis is treated in the hospital with IV antibiotics, fluids, and other medicines.    Follow these instructions at home:    Medicines    Take over-the-counter and prescription medicines only as told by your health care provider.  If you were prescribed an antibiotic medicine, take it as told by your health care provider. Do not stop using the antibiotic even if you start to feel better.  General instructions    Make sure you:  Empty your bladder often and completely. Do not hold urine for long periods of time.  Empty your bladder after sex.  Wipe from front to back after urinating or having a bowel movement if you are female. Use each tissue only one time when you wipe.  Drink enough fluid to keep your urine pale yellow.  Keep all follow-up visits. This is important.  Contact a health care provider if:  Your symptoms do not get better after 1–2 days.  Your symptoms go away and then return.  Get help right away if:  You have severe pain in your back or your lower abdomen.  You have a fever or chills.  You have nausea or vomiting.  Summary  A urinary tract infection (UTI) is an infection of any part of the urinary tract, which includes the kidneys, ureters, bladder, and urethra.  Most urinary tract infections are caused by bacteria in your genital area.  Treatment for this condition often includes antibiotic medicines.  If you were prescribed an antibiotic medicine, take it as told by your health care provider. Do not stop using the antibiotic even if you start to feel better.  Keep all follow-up visits. This is important.  This information is not intended to replace advice given to you by your health care provider. Make sure you discuss any questions you have with your health care provider.

## 2023-04-22 NOTE — ED ADULT NURSE NOTE - OBJECTIVE STATEMENT
77y male hx of prostate ca. HTN c/o catheter complication. states he has chronic catheter. last changed 5 weeks ago. missed apt to have it changed last week. martinez now leaking urine. urine cloudy with foul odor. +burning discomfort. denies f/c, vomiting, nausea.

## 2023-04-22 NOTE — ED PROVIDER NOTE - PATIENT PORTAL LINK FT
You can access the FollowMyHealth Patient Portal offered by [facility] by registering at the following website: [email or web address]. By joining [employer]’s FollowMyHealth portal, you will also be able to view your health information using other applications (apps) compatible with our system.

## 2023-04-22 NOTE — ED PROVIDER NOTE - CLINICAL SUMMARY MEDICAL DECISION MAKING FREE TEXT BOX
76 y/o m hx HTN, prostate CA with indwelling martinez presents c/o burning discomfort when urinating over the past 5 days with suprapubic discomfort.  Pt afebrile, nontoxic appearing, will change martinez and send urine, likely infection given his sx.  F/u labs and urine, reassess.

## 2023-04-22 NOTE — ED PROVIDER NOTE - OBJECTIVE STATEMENT
78 y/o m hx HTN, prostate CA with indwelling martinez presents c/o burning discomfort when urinating over the past 5 days with suprapubic discomfort and some urine leaking around his martinez.  Pt stating he was supposed to have his martinez changed this past week but his appointment got rescheduled for next month.  Last time it was changed was about 5 weeks ago.  Denies fever, chills, vomiting, all other ROS negative.

## 2023-04-25 LAB
-  AMPICILLIN: SIGNIFICANT CHANGE UP
-  CIPROFLOXACIN: SIGNIFICANT CHANGE UP
-  NITROFURANTOIN: SIGNIFICANT CHANGE UP
-  TETRACYCLINE: SIGNIFICANT CHANGE UP
-  VANCOMYCIN: SIGNIFICANT CHANGE UP
CULTURE RESULTS: SIGNIFICANT CHANGE UP
METHOD TYPE: SIGNIFICANT CHANGE UP
ORGANISM # SPEC MICROSCOPIC CNT: SIGNIFICANT CHANGE UP
ORGANISM # SPEC MICROSCOPIC CNT: SIGNIFICANT CHANGE UP
SPECIMEN SOURCE: SIGNIFICANT CHANGE UP

## 2023-05-12 ENCOUNTER — EMERGENCY (EMERGENCY)
Facility: HOSPITAL | Age: 78
LOS: 1 days | Discharge: HOME | End: 2023-05-12
Attending: EMERGENCY MEDICINE | Admitting: EMERGENCY MEDICINE
Payer: MEDICARE

## 2023-05-12 VITALS
OXYGEN SATURATION: 98 % | HEIGHT: 67 IN | WEIGHT: 121.92 LBS | RESPIRATION RATE: 18 BRPM | TEMPERATURE: 99 F | SYSTOLIC BLOOD PRESSURE: 159 MMHG | HEART RATE: 80 BPM | DIASTOLIC BLOOD PRESSURE: 108 MMHG

## 2023-05-12 VITALS
HEART RATE: 66 BPM | DIASTOLIC BLOOD PRESSURE: 96 MMHG | SYSTOLIC BLOOD PRESSURE: 167 MMHG | TEMPERATURE: 98 F | OXYGEN SATURATION: 99 % | RESPIRATION RATE: 18 BRPM

## 2023-05-12 DIAGNOSIS — R10.30 LOWER ABDOMINAL PAIN, UNSPECIFIED: ICD-10-CM

## 2023-05-12 DIAGNOSIS — Z96.0 PRESENCE OF UROGENITAL IMPLANTS: ICD-10-CM

## 2023-05-12 DIAGNOSIS — R82.71 BACTERIURIA: ICD-10-CM

## 2023-05-12 DIAGNOSIS — Z85.46 PERSONAL HISTORY OF MALIGNANT NEOPLASM OF PROSTATE: ICD-10-CM

## 2023-05-12 DIAGNOSIS — M19.90 UNSPECIFIED OSTEOARTHRITIS, UNSPECIFIED SITE: ICD-10-CM

## 2023-05-12 DIAGNOSIS — Z87.19 PERSONAL HISTORY OF OTHER DISEASES OF THE DIGESTIVE SYSTEM: ICD-10-CM

## 2023-05-12 DIAGNOSIS — Z96.659 PRESENCE OF UNSPECIFIED ARTIFICIAL KNEE JOINT: Chronic | ICD-10-CM

## 2023-05-12 DIAGNOSIS — Z87.39 PERSONAL HISTORY OF OTHER DISEASES OF THE MUSCULOSKELETAL SYSTEM AND CONNECTIVE TISSUE: ICD-10-CM

## 2023-05-12 LAB
ALBUMIN SERPL ELPH-MCNC: 3.8 G/DL — SIGNIFICANT CHANGE UP (ref 3.3–5)
ALP SERPL-CCNC: 132 U/L — HIGH (ref 40–120)
ALT FLD-CCNC: 6 U/L — LOW (ref 10–45)
ANION GAP SERPL CALC-SCNC: 10 MMOL/L — SIGNIFICANT CHANGE UP (ref 5–17)
APPEARANCE UR: ABNORMAL
AST SERPL-CCNC: 13 U/L — SIGNIFICANT CHANGE UP (ref 10–40)
BACTERIA # UR AUTO: PRESENT /HPF
BASOPHILS # BLD AUTO: 0.03 K/UL — SIGNIFICANT CHANGE UP (ref 0–0.2)
BASOPHILS NFR BLD AUTO: 0.5 % — SIGNIFICANT CHANGE UP (ref 0–2)
BILIRUB SERPL-MCNC: 0.2 MG/DL — SIGNIFICANT CHANGE UP (ref 0.2–1.2)
BILIRUB UR-MCNC: NEGATIVE — SIGNIFICANT CHANGE UP
BUN SERPL-MCNC: 14 MG/DL — SIGNIFICANT CHANGE UP (ref 7–23)
CALCIUM SERPL-MCNC: 8 MG/DL — LOW (ref 8.4–10.5)
CHLORIDE SERPL-SCNC: 103 MMOL/L — SIGNIFICANT CHANGE UP (ref 96–108)
CO2 SERPL-SCNC: 25 MMOL/L — SIGNIFICANT CHANGE UP (ref 22–31)
COLOR SPEC: YELLOW — SIGNIFICANT CHANGE UP
CREAT SERPL-MCNC: 0.87 MG/DL — SIGNIFICANT CHANGE UP (ref 0.5–1.3)
DIFF PNL FLD: ABNORMAL
EGFR: 89 ML/MIN/1.73M2 — SIGNIFICANT CHANGE UP
EOSINOPHIL # BLD AUTO: 0.36 K/UL — SIGNIFICANT CHANGE UP (ref 0–0.5)
EOSINOPHIL NFR BLD AUTO: 6.3 % — HIGH (ref 0–6)
EPI CELLS # UR: SIGNIFICANT CHANGE UP /HPF (ref 0–5)
GLUCOSE SERPL-MCNC: 114 MG/DL — HIGH (ref 70–99)
GLUCOSE UR QL: NEGATIVE — SIGNIFICANT CHANGE UP
HCT VFR BLD CALC: 34.5 % — LOW (ref 39–50)
HGB BLD-MCNC: 11 G/DL — LOW (ref 13–17)
IMM GRANULOCYTES NFR BLD AUTO: 0.2 % — SIGNIFICANT CHANGE UP (ref 0–0.9)
KETONES UR-MCNC: NEGATIVE — SIGNIFICANT CHANGE UP
LEUKOCYTE ESTERASE UR-ACNC: ABNORMAL
LIDOCAIN IGE QN: 22 U/L — SIGNIFICANT CHANGE UP (ref 7–60)
LYMPHOCYTES # BLD AUTO: 1.19 K/UL — SIGNIFICANT CHANGE UP (ref 1–3.3)
LYMPHOCYTES # BLD AUTO: 20.9 % — SIGNIFICANT CHANGE UP (ref 13–44)
MCHC RBC-ENTMCNC: 29.6 PG — SIGNIFICANT CHANGE UP (ref 27–34)
MCHC RBC-ENTMCNC: 31.9 GM/DL — LOW (ref 32–36)
MCV RBC AUTO: 93 FL — SIGNIFICANT CHANGE UP (ref 80–100)
MONOCYTES # BLD AUTO: 0.72 K/UL — SIGNIFICANT CHANGE UP (ref 0–0.9)
MONOCYTES NFR BLD AUTO: 12.6 % — SIGNIFICANT CHANGE UP (ref 2–14)
NEUTROPHILS # BLD AUTO: 3.39 K/UL — SIGNIFICANT CHANGE UP (ref 1.8–7.4)
NEUTROPHILS NFR BLD AUTO: 59.5 % — SIGNIFICANT CHANGE UP (ref 43–77)
NITRITE UR-MCNC: NEGATIVE — SIGNIFICANT CHANGE UP
NRBC # BLD: 0 /100 WBCS — SIGNIFICANT CHANGE UP (ref 0–0)
PH UR: 8 — SIGNIFICANT CHANGE UP (ref 5–8)
PLATELET # BLD AUTO: 305 K/UL — SIGNIFICANT CHANGE UP (ref 150–400)
POTASSIUM SERPL-MCNC: 4.2 MMOL/L — SIGNIFICANT CHANGE UP (ref 3.5–5.3)
POTASSIUM SERPL-SCNC: 4.2 MMOL/L — SIGNIFICANT CHANGE UP (ref 3.5–5.3)
PROT SERPL-MCNC: 6.5 G/DL — SIGNIFICANT CHANGE UP (ref 6–8.3)
PROT UR-MCNC: 100 MG/DL
RBC # BLD: 3.71 M/UL — LOW (ref 4.2–5.8)
RBC # FLD: 17.6 % — HIGH (ref 10.3–14.5)
RBC CASTS # UR COMP ASSIST: > 10 /HPF
SODIUM SERPL-SCNC: 138 MMOL/L — SIGNIFICANT CHANGE UP (ref 135–145)
SP GR SPEC: 1.02 — SIGNIFICANT CHANGE UP (ref 1–1.03)
UROBILINOGEN FLD QL: 0.2 E.U./DL — SIGNIFICANT CHANGE UP
WBC # BLD: 5.7 K/UL — SIGNIFICANT CHANGE UP (ref 3.8–10.5)
WBC # FLD AUTO: 5.7 K/UL — SIGNIFICANT CHANGE UP (ref 3.8–10.5)
WBC UR QL: ABNORMAL /HPF

## 2023-05-12 PROCEDURE — 85025 COMPLETE CBC W/AUTO DIFF WBC: CPT

## 2023-05-12 PROCEDURE — 83690 ASSAY OF LIPASE: CPT

## 2023-05-12 PROCEDURE — 96374 THER/PROPH/DIAG INJ IV PUSH: CPT

## 2023-05-12 PROCEDURE — 36415 COLL VENOUS BLD VENIPUNCTURE: CPT

## 2023-05-12 PROCEDURE — 99284 EMERGENCY DEPT VISIT MOD MDM: CPT | Mod: 25

## 2023-05-12 PROCEDURE — 81001 URINALYSIS AUTO W/SCOPE: CPT

## 2023-05-12 PROCEDURE — 96375 TX/PRO/DX INJ NEW DRUG ADDON: CPT

## 2023-05-12 PROCEDURE — 87186 SC STD MICRODIL/AGAR DIL: CPT

## 2023-05-12 PROCEDURE — 80053 COMPREHEN METABOLIC PANEL: CPT

## 2023-05-12 PROCEDURE — 87184 SC STD DISK METHOD PER PLATE: CPT

## 2023-05-12 PROCEDURE — 99284 EMERGENCY DEPT VISIT MOD MDM: CPT

## 2023-05-12 PROCEDURE — 87086 URINE CULTURE/COLONY COUNT: CPT

## 2023-05-12 RX ORDER — KETOROLAC TROMETHAMINE 30 MG/ML
15 SYRINGE (ML) INJECTION ONCE
Refills: 0 | Status: DISCONTINUED | OUTPATIENT
Start: 2023-05-12 | End: 2023-05-12

## 2023-05-12 RX ORDER — CEFTRIAXONE 500 MG/1
1000 INJECTION, POWDER, FOR SOLUTION INTRAMUSCULAR; INTRAVENOUS ONCE
Refills: 0 | Status: COMPLETED | OUTPATIENT
Start: 2023-05-12 | End: 2023-05-12

## 2023-05-12 RX ORDER — CEFPODOXIME PROXETIL 100 MG
1 TABLET ORAL
Qty: 14 | Refills: 0
Start: 2023-05-12 | End: 2023-05-18

## 2023-05-12 RX ADMIN — Medication 15 MILLIGRAM(S): at 02:44

## 2023-05-12 RX ADMIN — CEFTRIAXONE 100 MILLIGRAM(S): 500 INJECTION, POWDER, FOR SOLUTION INTRAMUSCULAR; INTRAVENOUS at 03:12

## 2023-05-12 NOTE — ED ADULT TRIAGE NOTE - CHIEF COMPLAINT QUOTE
Pt presents to the ED with complaints of abdominal pain and dysuria. Pt has a chronic martinez catheter, as per pt, he was seen here previously with the same symptoms and was discharged with antibiotics.

## 2023-05-12 NOTE — ED ADULT NURSE REASSESSMENT NOTE - NS ED NURSE REASSESS COMMENT FT1
pt had 16Fr martinez upon arrival to leg bag. martinez catheter was changed upon arrival to the ED - pt tolerated martinez catheter change well

## 2023-05-12 NOTE — ED PROVIDER NOTE - PROGRESS NOTE DETAILS
will treat bacteriuria - per prior cultures nonresistant enterococcus. recommend f/u with   I have discussed the discharge plan with the patient. The patient agrees with the plan, as discussed.  The patient understands Emergency Department diagnosis is a preliminary diagnosis often based on limited information and that the patient must adhere to the follow-up plan as discussed.  The patient understands that if the symptoms worsen or if prescribed medications do not have the desired/planned effect that the patient may return to the Emergency Department at any time for further evaluation and treatment.

## 2023-05-12 NOTE — ED ADULT NURSE NOTE - NS PRO PASSIVE SMOKE EXP
Final Anesthesia Post-op Assessment    Patient: Dawson Payton  Procedure(s) Performed: LEFT CARPAL TUNNEL RELEASE - LEFT  Anesthesia type: MAC    Vitals Value Taken Time   Temp 98 12/13/22 0810   Pulse 60 12/13/22 0809   Resp 10 12/13/22 0810   SpO2 95 % 12/13/22 0809   /70 12/13/22 0805   Vitals shown include unvalidated device data.      Patient Location: Phase II  Post-op Vital Signs:stable  Level of Consciousness: awake and alert  Respiratory Status: spontaneous ventilation  Cardiovascular stable  Hydration: euvolemic  Pain Management: adequately controlled  Handoff: Handoff to receiving clinician was performed and questions were answered  Vomiting: none  Nausea: None  Airway Patency:patent  Post-op Assessment: no complications and patient tolerated procedure well with no complications      No complications documented.   
Unknown

## 2023-05-12 NOTE — ED PROVIDER NOTE - OBJECTIVE STATEMENT
77M hx prostate CA, indwelling martinez, c/o lower abd pain. pt states ongoing for past 4 days. +dysuria. no hematuria. no vomiting, no fevers. pt states similar symptoms to when he was here a few weeks ago and was found to have UTI and had martinez last changed at that time.

## 2023-05-12 NOTE — ED PROVIDER NOTE - PATIENT PORTAL LINK FT
You can access the FollowMyHealth Patient Portal offered by [facility] by registering at the following website: [email or web address]. By joining [employer]’s FollowMyHealth portal, you will also be able to view your health information using other applications (apps) compatible with our system.

## 2023-05-12 NOTE — ED ADULT NURSE NOTE - OBJECTIVE STATEMENT
pt c/o lower abd pain x4 days w/ penile pain when urinating. pt has chronic martinez in place, states 1 mo ago he had similar symptoms and was prescribed antibiotics and "felt much better after." pt states the pain feels similar to the last time, denies any fever, chills or hematuria. Pt noted to have leg bag that is draining clear, yellow urine. Pt reports last BM yesterday which was normal

## 2023-05-12 NOTE — ED PROVIDER NOTE - NSFOLLOWUPINSTRUCTIONS_ED_ALL_ED_FT
Seguimiento con tu urólogo      Infección del tracto urinario relacionada al uso de perry sonda    LO QUE NECESITA SABER:    Perry infección urinaria relacionada al uso de perry sonda (CAUTI, por simi siglas en inglés) es perry infección causada por perry sonda urinaria permanente. La infección es provocada por gérmenes que usualmente no viven en el tracto urinario. El germen puede ser un hongo o perry bacteria. Los gérmenes podrían entrar en parson tracto urinario cuando le colocan la sonda o mientras la sonda permanece en la vejiga. La infección podría viajar a lo evangelista de la sonda y entrar a la vejiga o a los riñones.    INSTRUCCIONES SOBRE EL JAVAD HOSPITALARIA:    Regrese a la jimmie de emergencias si:    Usted tiene dolor severo en la parte inferior de parson espalda o en el abdomen.    Usted orina con gualberto.    Usted garry de orinar u orina mucho menos de lo normal.  Llame a parson médico si:    Tiene fiebre.    Simi síntomas no mejoran o si empeoran.    Usted tiene preguntas o inquietudes acerca de parson condición o cuidado.  Medicamentos:Es posible que usted necesite alguno de los siguientes:    Los antibióticosayudan a tratar perry infección provocada por perry bacteria.    Los antimicóticosayudan a evitar o a tratar perry infección a causa de un hongo.    Acetaminofénalivia el dolor y baja la fiebre. Está disponible sin receta médica. Pregunte la cantidad y la frecuencia con que debe tomarlos. Siga las indicaciones. Adolfo las etiquetas de todos los demás medicamentos que esté usando para saber si también contienen acetaminofén, o pregunte a parson médico o farmacéutico. El acetaminofén puede causar daño en el hígado cuando no se sherice de forma correcta.    AINEcomo el ibuprofeno, ayudan a disminuir la inflamación, el dolor y la fiebre. Ny medicamento está disponible con o sin perry receta médica. Los CATHRYN pueden causar sangrado estomacal o problemas renales en ciertas personas. Si usted sherice un medicamento anticoagulante, siempre pregúntele a parson médico si los CATHRYN son seguros para usted. Siempre adolfo la etiqueta de ny medicamento y siga las instrucciones.    Oval simi medicamentos kevin se le haya indicado.Consulte con parson médico si usted tamika que parson medicamento no le está ayudando o si presenta efectos secundarios. Infórmele al médico si usted es alérgico a algún medicamento. Mantenga perry lista actualizada de los medicamentos, las vitaminas y los productos herbales que sherice. Incluya los siguientes datos de los medicamentos: cantidad, frecuencia y motivo de administración. Traiga con usted la lista o los envases de las píldoras a simi citas de seguimiento. Lleve la lista de los medicamentos con usted en romero de perry emergencia.  Cuidados personales:    Oval líquidos kevin se le haya indicado.Los líquidos pueden ayudar a eliminar las bacterias del tracto urinario. Pregunte cuánto líquido debe david cada día y cuáles líquidos son los más adecuados para usted. Es probable que usted necesite david más líquidos de lo habitual para ayudar a deshacerse de la bacteria. No tome alcohol, cafeína ni jugos cítricos. Estos pueden irritar parson vejiga y aumentar simi síntomas.    Mantenga el área de la sonda limpia.Limpie parson piel alrededor de la sonda kevin se le indique. Báñese perry vez al día. No tome mariah de guillermo o se meta en perry guillermo de hidromasaje hasta que parson infección se cure.    No tenga relaciones sexuales hasta que parson médico lo autorice.Las relaciones sexuales podrían retardar parson recuperación o provocar otra infección del tracto urinario.  Evite perry infección del tracto urinario relacionada al uso de perry sonda:    Lávese las star antes y después de usar el baño o de tocar la sonda.Lávese las star para evitar la propagación de perry infección a parson tracto urinario.  Lavado de star      Limpie todas las partes de parson sonda kevin se le indique.Mantenga la tubería de parson sonda limpia. No ponga la sonda en el piso. No permita que la boquilla del drenaje toque el inodoro. Use un hisopo con alcohol para limpiar el extremo de la boquilla del drenaje kevin se le indique.    Mantenga la bolsa del drenaje por debajo de parson cintura.[place] evitará que la orina se devuelva a parson vejiga, lo cual puede provocar perry infección.    Vacíe la bolsa de la orina kevin se le indique.[place] podría evitar que la orina se devuelva a parson vejiga.    Las mujeres deben limpiarse de adelante hacia atrás después de defecar.[place] podría evitar que los gérmenes entren en el tracto urinario.    Mantenga la sonda asegurada a parson pierna kevin se le indique.Use cinta o soporte especial para la sonda para evitar que la jale. [place] también podría evitar torceduras que provoquen que la orina se devuelva a parson vejiga.  Acuda a la consulta de control con parson médico según las indicaciones:Anote simi preguntas para que se acuerde de hacerlas marva simi visitas.

## 2023-05-12 NOTE — ED PROVIDER NOTE - CLINICAL SUMMARY MEDICAL DECISION MAKING FREE TEXT BOX
indwelling martinez, dysuria, lower abd ttp, no vomiting, afebrile. likely UTI. doubt appendicitis, doubt diverticulitis/colitis, doubt sbo  -check labs  -toradol  -exchange martinez - check ua

## 2023-05-12 NOTE — ED ADULT NURSE NOTE - NSFALLUNIVINTERV_ED_ALL_ED
Bed/Stretcher in lowest position, wheels locked, appropriate side rails in place/Call bell, personal items and telephone in reach/Instruct patient to call for assistance before getting out of bed/chair/stretcher/Non-slip footwear applied when patient is off stretcher/[place] to call system/Physically safe environment - no spills, clutter or unnecessary equipment/Purposeful proactive rounding/Room/bathroom lighting operational, light cord in reach

## 2023-05-14 LAB
-  AZTREONAM: SIGNIFICANT CHANGE UP
-  CEFEPIME: SIGNIFICANT CHANGE UP
-  CIPROFLOXACIN: SIGNIFICANT CHANGE UP
-  GENTAMICIN: SIGNIFICANT CHANGE UP
-  LEVOFLOXACIN: SIGNIFICANT CHANGE UP
-  PIPERACILLIN/TAZOBACTAM: SIGNIFICANT CHANGE UP
-  TOBRAMYCIN: SIGNIFICANT CHANGE UP
METHOD TYPE: SIGNIFICANT CHANGE UP
METHOD TYPE: SIGNIFICANT CHANGE UP

## 2023-05-15 LAB
-  AMPICILLIN: SIGNIFICANT CHANGE UP
-  CIPROFLOXACIN: SIGNIFICANT CHANGE UP
-  NITROFURANTOIN: SIGNIFICANT CHANGE UP
-  TETRACYCLINE: SIGNIFICANT CHANGE UP
-  VANCOMYCIN: SIGNIFICANT CHANGE UP
CULTURE RESULTS: SIGNIFICANT CHANGE UP
METHOD TYPE: SIGNIFICANT CHANGE UP
ORGANISM # SPEC MICROSCOPIC CNT: SIGNIFICANT CHANGE UP
SPECIMEN SOURCE: SIGNIFICANT CHANGE UP

## 2023-05-15 RX ORDER — CIPROFLOXACIN LACTATE 400MG/40ML
1 VIAL (ML) INTRAVENOUS
Qty: 20 | Refills: 0
Start: 2023-05-15 | End: 2023-05-24

## 2023-05-30 ENCOUNTER — EMERGENCY (EMERGENCY)
Facility: HOSPITAL | Age: 78
LOS: 1 days | Discharge: HOME | End: 2023-05-30
Attending: EMERGENCY MEDICINE | Admitting: EMERGENCY MEDICINE
Payer: MEDICARE

## 2023-05-30 VITALS
TEMPERATURE: 98 F | RESPIRATION RATE: 18 BRPM | SYSTOLIC BLOOD PRESSURE: 173 MMHG | OXYGEN SATURATION: 99 % | HEART RATE: 76 BPM | WEIGHT: 121.92 LBS | HEIGHT: 67 IN | DIASTOLIC BLOOD PRESSURE: 93 MMHG

## 2023-05-30 VITALS
OXYGEN SATURATION: 99 % | SYSTOLIC BLOOD PRESSURE: 158 MMHG | DIASTOLIC BLOOD PRESSURE: 92 MMHG | HEART RATE: 65 BPM | RESPIRATION RATE: 18 BRPM | TEMPERATURE: 99 F
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
## 2023-05-30 DIAGNOSIS — I10 ESSENTIAL (PRIMARY) HYPERTENSION: ICD-10-CM

## 2023-05-30 DIAGNOSIS — M25.511 PAIN IN RIGHT SHOULDER: ICD-10-CM

## 2023-05-30 DIAGNOSIS — M79.644 PAIN IN RIGHT FINGER(S): ICD-10-CM

## 2023-05-30 DIAGNOSIS — S40.011A CONTUSION OF RIGHT SHOULDER, INITIAL ENCOUNTER: ICD-10-CM

## 2023-05-30 DIAGNOSIS — Z96.652 PRESENCE OF LEFT ARTIFICIAL KNEE JOINT: ICD-10-CM

## 2023-05-30 DIAGNOSIS — R42 DIZZINESS AND GIDDINESS: ICD-10-CM

## 2023-05-30 DIAGNOSIS — M50.30 OTHER CERVICAL DISC DEGENERATION, UNSPECIFIED CERVICAL REGION: ICD-10-CM

## 2023-05-30 DIAGNOSIS — Z96.659 PRESENCE OF UNSPECIFIED ARTIFICIAL KNEE JOINT: Chronic | ICD-10-CM

## 2023-05-30 DIAGNOSIS — M19.041 PRIMARY OSTEOARTHRITIS, RIGHT HAND: ICD-10-CM

## 2023-05-30 DIAGNOSIS — Z96.611 PRESENCE OF RIGHT ARTIFICIAL SHOULDER JOINT: ICD-10-CM

## 2023-05-30 DIAGNOSIS — M25.561 PAIN IN RIGHT KNEE: ICD-10-CM

## 2023-05-30 DIAGNOSIS — K57.90 DIVERTICULOSIS OF INTESTINE, PART UNSPECIFIED, WITHOUT PERFORATION OR ABSCESS WITHOUT BLEEDING: ICD-10-CM

## 2023-05-30 DIAGNOSIS — K21.9 GASTRO-ESOPHAGEAL REFLUX DISEASE WITHOUT ESOPHAGITIS: ICD-10-CM

## 2023-05-30 DIAGNOSIS — M19.042 PRIMARY OSTEOARTHRITIS, LEFT HAND: ICD-10-CM

## 2023-05-30 DIAGNOSIS — Z85.46 PERSONAL HISTORY OF MALIGNANT NEOPLASM OF PROSTATE: ICD-10-CM

## 2023-05-30 DIAGNOSIS — Z96.651 PRESENCE OF RIGHT ARTIFICIAL KNEE JOINT: ICD-10-CM

## 2023-05-30 DIAGNOSIS — I73.9 PERIPHERAL VASCULAR DISEASE, UNSPECIFIED: ICD-10-CM
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
## 2023-05-30 PROCEDURE — 99284 EMERGENCY DEPT VISIT MOD MDM: CPT

## 2023-05-30 PROCEDURE — 73060 X-RAY EXAM OF HUMERUS: CPT | Mod: 26,RT

## 2023-05-30 PROCEDURE — 73564 X-RAY EXAM KNEE 4 OR MORE: CPT | Mod: 26,RT

## 2023-05-30 PROCEDURE — 73130 X-RAY EXAM OF HAND: CPT | Mod: 26,RT

## 2023-05-30 PROCEDURE — 73030 X-RAY EXAM OF SHOULDER: CPT | Mod: 26,RT

## 2023-05-30 PROCEDURE — 99284 EMERGENCY DEPT VISIT MOD MDM: CPT | Mod: 25

## 2023-05-30 PROCEDURE — 73564 X-RAY EXAM KNEE 4 OR MORE: CPT

## 2023-05-30 PROCEDURE — 73060 X-RAY EXAM OF HUMERUS: CPT

## 2023-05-30 PROCEDURE — 73130 X-RAY EXAM OF HAND: CPT

## 2023-05-30 PROCEDURE — 73030 X-RAY EXAM OF SHOULDER: CPT

## 2023-05-30 RX ORDER — ACETAMINOPHEN 500 MG
975 TABLET ORAL ONCE
Refills: 0 | Status: COMPLETED | OUTPATIENT
Start: 2023-05-30 | End: 2023-05-30

## 2023-05-30 RX ADMIN — Medication 975 MILLIGRAM(S): at 12:13

## 2023-05-30 RX ADMIN — Medication 975 MILLIGRAM(S): at 13:15

## 2023-05-30 NOTE — ED PROVIDER NOTE - ATTENDING APP SHARED VISIT CONTRIBUTION OF CARE
77 M mechanical trip and fall 3 days ago using his walker, BENJAMIN now with ongoing R shoulder R knee pain.  ? R finger swelling pain.  DROM R shoulder.  Mild ttp.  Xrays neg for acute fx ? lucency around PEG.  Plan ortho consult.  NVI.  Pain control, likely DC.

## 2023-05-30 NOTE — ED PROVIDER NOTE - NSFOLLOWUPINSTRUCTIONS_ED_ALL_ED_FT
[facility]    Te vimos hoy por dolor de hombro y dolor de rodilla.    CONTROL DE DOLOR:    Puede david ibuprofeno (Motrin, Advil) 600 mg (3 tabletas regulares) cada 6 horas según sea necesario para el dolor. Por favor, tómelo con alimentos. Deje de david si desarrolla dolor abdominal, heces oscuras/con gualberto. No mezclar con otros CATHRYN (es decir, naproxeno, Aleve, Celecoxib).    También puede david acetaminofén (Tylenol) 650-975 mg (2-3 tabletas regulares) o 500-1000 mg (1-2 tabletas extra rubi) cada 6 horas según sea necesario para el dolor. No exceda los 4000 mg en 1 día. Estos medicamentos se pueden comprar sin receta.    Recomiendo alternar el ibuprofeno y el Tylenol para que obtenga medicamentos marva todo el día. Por ejemplo, tome el ibuprofeno, luego 3 horas más tarde tome el Tylenol, luego 3 horas más tarde tome el ibuprofeno y repita según sea necesario.    Descansar. Aplique hielo en el área afectada marva 20 minutos, luego 20 minutos de descanso. Puedes repetir a lo evangelista del día. Use la venda ACE según las instrucciones. Elevar la extremidad afectada.    Pascale un seguimiento con un ortopedista en 1 semana para perry reevaluación.  Tenga en cuenta que ninguna visita de emergencia está completa sin un seguimiento con parson proveedor de atención primaria en 1 semana. Traiga copias de todos los documentos de elena y los resultados y muéstreselas a parson médico.    Continúe tomando todos los medicamentos anteriores según las instrucciones, a menos que discutamos lo contrario.    Agradezco parson paciencia y espero que se sienta mejor pronto.    Regrese a la jimmie de emergencias de inmediato si presenta fiebre, escalofríos, dolor en el pecho, dificultad para respirar, empeoramiento y/o cualquier síntoma preocupante.  ---  Thank you for visiting [facility] Emergency Department.      We saw you today for shoulder pain and knee pain.    PAIN CONTROL:   You may take ibuprofen (Motrin, Advil) 600 mg (3 regular tablets) every 6 hours as needed for pain.  Please take with food.  Stop taking if you develop abdominal pain, dark/ bloody stools.  Do not mix with other NSAIDS (ie. Naproxen, Aleve, Celecoxib).  You may also take acetaminophen (Tylenol) 650-975mg (2-3 regular tablets) or 500-1000mg (1-2 extra strength tablets) every 6 hours as needed for pain.  Do not exceed 4000 mg in 1 day. These medications may be bought over the counter.    I recommend alternating the Ibuprofen and Tylenol so you are getting medications around the clock.  For example take the Ibuprofen, then 3 hours later take the Tylenol, then 3 hours later take the Ibuprofen, and repeat as needed.    Rest. Apply ice to affected area 20 minutes on, then 20 minutes off.  You may repeat throughout the day.  Please wear ACE wrap as instructed. Elevate affected extremity.    Please follow up with an orthopedist in 1 week for re-evaluation.   Please know that no emergency visit is complete without follow-up with your primary care provider in 1 week.  Please bring copies of all discharge papers and results and show to your doctor.      Please continue taking all previous medications as instructed unless we discussed otherwise.     I appreciated your patience and hope you feel better soon.     Return to ER immediately if you develop fevers, chills, chest pain, shortness of breath, worsening and/or any concerning symptoms.

## 2023-05-30 NOTE — ED PROVIDER NOTE - PHYSICAL EXAMINATION
CONSTITUTIONAL: Awake, alert.  Nontoxic, no acute distress.    HEAD: Normocephalic, atraumatic.    EYES: Conjunctivae clear without exudates or hemorrhage. Sclera is non-icteric.    ENT: Normal appearing external ears, nose, mucous membranes moist.    NECK: supple, trachea midline.  No midline or paraspinal ttp.    LUNGS:  No acute respiratory distress.  Non-tachypneic and non-labored.  Speaking in full sentences    MUSCULOSKELETAL:  No swelling.  +generalized ttp to R shoulder.  Limited ROM to R shoulder 2/2 pain.  Reduced strength RUE 2/2 pain in R shoulder.  +generalized ttp to R knee.  FROM b/l lower ext.  Sensation and motor function grossly intact.  +arthritic changes to b/l hands.  No focal ttp to hands, FROM present to b/l hands.  Strong equal peripheral pulses b/l.   Cap refill < 2 b/l upper and lower ext.  All compartments soft.    SKIN: Skin in warm, dry and intact without rashes or lesions.  Appropriate color for ethnicity.    NEUROLOGICAL:  Patient is alert, face symmetric. Moving all ext without issue.    PSYCH: Appropriate mood and affect. Good judgment and insight.

## 2023-05-30 NOTE — ED PROVIDER NOTE - WR ORDER NAME 4
Xray Knee 4 Views, Right I personally performed the service described in the documentation recorded by the scribe in my presence, and it accurately and completely records my words and actions.

## 2023-05-30 NOTE — ED PROVIDER NOTE - CLINICAL SUMMARY MEDICAL DECISION MAKING FREE TEXT BOX
78 y/o male w/ hx htn, prostate ca w/ chronic indwelling martinez p/w R shoulder pain, R knee pain, and mild R index finger swelling s/p mech trip and fall 3 days ago.  States was walking with rolling walker and brakes did not brake fully causing him to fall.  Denies head strike, loc.  Notes felt slightly dizzy after fall, which has resolved.  Denies asa/ ac use.  Denies f/c, cough, cp, sob, abd pain, n/v/d.  Martinez was changed on 5/12/23 in ED.  No acute urinary complaints.  Exam notable for +ttp to R shoulder and R knee.  Limited ROM to R shoulder.    No signs head trauma  Fall seems mechanical in nature  Will get xrs R shoulder/ humerus, R hand, and R knee  Pain control PRN  Re-eval 78 y/o male w/ hx htn, prostate ca w/ chronic indwelling martinez p/w R shoulder pain, R knee pain, and mild R index finger swelling s/p mech trip and fall 3 days ago.  States was walking with rolling walker and brakes did not brake fully causing him to fall.  Denies head strike, loc.  Notes felt slightly dizzy after fall, which has resolved.  Denies asa/ ac use.  Denies f/c, cough, cp, sob, abd pain, n/v/d.  Martinez was changed on 5/12/23 in ED.  No acute urinary complaints.  Exam notable for +ttp to R shoulder and R knee.  Limited ROM to R shoulder.    No signs head trauma  Fall seems mechanical in nature  Will get xrs R shoulder/ humerus, R hand, and R knee  Pain control PRN  Re-eval  --  XR R shoulder: 1.  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 2.  Degenerative changes of the hand.  XR R knee without acute findings  Pt seen by ortho regarding shoulder xr read, states no acute intervention, supportive care and may f/u outpatient  Will DC with strict return precautions  F/U PMD and ortho outpatient

## 2023-05-30 NOTE — ED ADULT TRIAGE NOTE - NS ED NURSE DIRECT TO ROOM YN
Pain changes, sometimes RLL sometimes suprapubic, moves around. Denies fever. Intensity varies. Cramp at times. Decreased PO intake secondary to pain. Today was more cramp in the morning, was tired of the \"pressure\" so used a glycerine suppository and was able to have a more formed stool. Was having intermittent sharp LLQ to mid abd, waves of pain. Improving now. No nausea.     Informed will discuss moving his colonoscopy up with Dr. Dubon. Patient is aware if his symptoms worsen he is to call back or be seen.    No

## 2023-05-30 NOTE — ED ADULT NURSE NOTE - NSFALLRISKINTERV_ED_ALL_ED

## 2023-05-30 NOTE — ED PROVIDER NOTE - OBJECTIVE STATEMENT
76 y/o male w/ hx htn, prostate ca w/ chronic indwelling martinez p/w R shoulder pain, R knee pain, and mild R index finger swelling s/p mech trip and fall 3 days ago.  States was walking with rolling walker and brakes did not brake fully causing him to fall.  Denies head strike, loc.  Notes felt slightly dizzy after fall, which has resolved.  Denies asa/ ac use.  Denies f/c, cough, cp, sob, abd pain, n/v/d.  Martinez was changed on 5/12/23 in ED.  No acute urinary complaints.

## 2023-05-30 NOTE — CONSULT NOTE ADULT - SUBJECTIVE AND OBJECTIVE BOX
Orthopaedic Surgery Consult Note    CC: Right shoulder pain        HPI: Patient is a 77y old Male pmhx prostate ca, right reverse total shoulder replacement, bilateral knee replacements who presents with a chief complaint of right shoulder pain after a mechanical fall 3 days ago. Patient reports break on walker did not work and he fell forward onto his right shoulder and right knee while in a bakery. denies head strike. Patient states he was able to get up on his own. Reports deep non radiating right shoulder pain since fall. Denies right knee pain. Pt states shoulder pain is anterior worse with movements. Denies numbness or tingling into right upper extremity. Patient has a hx of right reverse total shoulder replacement 8 months ago with Dr. Samuel at [facility]. He is actively participating in physical therapy and has a follow up with him in 3 weeks. He states he takes otc pain reliever as needed but unsure of the name. Patient reports range of motion is consistent with prior to fall, however, he notes his limitation was not associated with pain prior to fall.       ROS: Positive for above  Denies fevers, chills.   All other systems negative, except for above.     Allergies  No Known Allergies  Intolerances      PAST MEDICAL & SURGICAL HISTORY:  Prostate cancer  Tendonitis  Arthritis  Vitamin B 12 deficiency  GERD (gastroesophageal reflux disease)  Diverticulosis  Adenoma of colon  DDD (degenerative disc disease), cervical  PVD (peripheral vascular disease)  Dyspepsia  History of total knee replacement      FAMILY HISTORY:  FH: myocardial infarction (Child)  son at age 34    FH: Parkinson's disease (Mother)  mother      Medications:     Vital Signs Last 24 Hrs  T(C): 36.9 (30 May 2023 10:25), Max: 36.9 (30 May 2023 10:25)  T(F): 98.4 (30 May 2023 10:25), Max: 98.4 (30 May 2023 10:25)  HR: 76 (30 May 2023 10:25) (76 - 76)  BP: 173/93 (30 May 2023 10:25) (173/93 - 173/93)  BP(mean): --  RR: 18 (30 May 2023 10:25) (18 - 18)  SpO2: 99% (30 May 2023 10:25) (99% - 99%)    Parameters below as of 30 May 2023 10:25  Patient On (Oxygen Delivery Method): room air        PE:  VS: stable, reviewed per nursing documentation  General: No acute distress, resting comfortably  Neuro: Alert, oriented x 3  Psych: Normal mood & affect  HEENT: normocephalic, atraumatic   Neck: trachea midline  Respiratory: nonlabored on room air   CV: no cyanosis, no peripheral edema    Skin: Warm, dry, no rash, no lesions, no abrasions no ecchymosis   MSK: atraumatic with exception of below  RUE     -Inspection/palpation:  + tenderness to palpation over the deltoid and anterior right shoulder, no swelling, no deformity,    -Compartments: soft, nontender bilaterally     -ROM: limited AROM secondary to pain of the , limited PROM secondary to pain of the     -Motor: triceps/biceps/ strength 5/5 bilateral upper extremities     -Quadriceps/TA/GS/EHL/FHL 5/5 bilateral lower extremities     -Sensation: intact to light touch bilateral upper extremities /  intact to light touch bilateral lower extremities    -Pulses: 2+ radial pulses bilaterally, symmetric; 2+ DP pulses bilaterally, symmetric, extremities warm and    well perfused, capillary refill brisk  RLE     -Inspection/palpation: + tenderness to palpation over the  , + swelling over the, + deformity of the     -Compartments: soft, nontender bilaterally     -ROM: limited AROM secondary to pain of the , limited PROM secondary to pain of the     -Motor: triceps/biceps/ strength 5/5 bilateral upper extremities     -Quadriceps/TA/GS/EHL/FHL 5/5 bilateral lower extremities     -Sensation: intact to light touch bilateral upper extremities /  intact to light touch bilateral lower extremities    -Pulses: 2+ radial pulses bilaterally, symmetric; 2+ DP pulses bilaterally, symmetric, extremities warm and    well perfused, capillary refill brisk                    Imaging:     A/P: 77yMale      Risks and benefits were discussed for   Weight bearing status  Conservative treatment rest, ice, elevation, NSAIDs  Patient history, exam, imaging and plan discussed with   who is in agreement    Ortho Pager [phone]    ___ minutes spent on total encounter, over 50% of the visit was spent counseling and/or coordinating care.    Orthopaedic Surgery Consult Note    CC: Right shoulder pain        HPI: Patient is a 77y old Male pmhx prostate ca, right reverse total shoulder replacement, bilateral knee replacements who presents with a chief complaint of right shoulder pain after a mechanical fall 3 days ago. Patient reports break on walker did not work and he fell forward onto his right shoulder and right knee while in a bakery. denies head strike. Patient states he was able to get up on his own. Reports deep non radiating right shoulder pain since fall. Denies right knee pain. Pt states shoulder pain is anterior worse with movements. Denies numbness or tingling into right upper extremity. Patient has a hx of right reverse total shoulder replacement 8 months ago with Dr. Samuel at [facility]. He is actively participating in physical therapy and has a follow up with him in 3 weeks. He states he takes otc pain reliever as needed but unsure of the name. Patient reports range of motion is consistent with prior to fall, however, he notes his limitation was not associated with pain prior to fall.       ROS: Positive for above  Denies fevers, chills.   All other systems negative, except for above.     Allergies  No Known Allergies  Intolerances      PAST MEDICAL & SURGICAL HISTORY:  Prostate cancer  Tendonitis  Arthritis  Vitamin B 12 deficiency  GERD (gastroesophageal reflux disease)  Diverticulosis  Adenoma of colon  DDD (degenerative disc disease), cervical  PVD (peripheral vascular disease)  Dyspepsia  History of total knee replacement      FAMILY HISTORY:  FH: myocardial infarction (Child)  son at age 34    FH: Parkinson's disease (Mother)  mother      Medications:     Vital Signs Last 24 Hrs  T(C): 36.9 (30 May 2023 10:25), Max: 36.9 (30 May 2023 10:25)  T(F): 98.4 (30 May 2023 10:25), Max: 98.4 (30 May 2023 10:25)  HR: 76 (30 May 2023 10:25) (76 - 76)  BP: 173/93 (30 May 2023 10:25) (173/93 - 173/93)  BP(mean): --  RR: 18 (30 May 2023 10:25) (18 - 18)  SpO2: 99% (30 May 2023 10:25) (99% - 99%)    Parameters below as of 30 May 2023 10:25  Patient On (Oxygen Delivery Method): room air        PE:  VS: stable, reviewed per nursing documentation  General: No acute distress, resting comfortably  Neuro: Alert, oriented x 3  Psych: Normal mood & affect  HEENT: normocephalic, atraumatic   Neck: trachea midline  Respiratory: nonlabored on room air   CV: no cyanosis, no peripheral edema    Skin: Warm, dry, no rash, no lesions, no abrasions no ecchymosis   MSK: atraumatic with exception of below  RUE     -Inspection/palpation:  well healed vertical scar over anterior right shoulder, + tenderness to palpation over the deltoid and anterior right shoulder, no swelling, no deformity, no erythema, ecchymosis, warmth    -Compartments: soft, nontender bilaterally     -ROM: limited AROM secondary to pain of the right shoulder, limited PROM secondary to pain of the right shoulder, flexion- 0-100 degrees, abduction- 0-90 degrees, external rotation neutral to 60 degrees and internal rotation neutral internal rotation to 50 degrees with pain    -Motor: triceps/biceps/ strength 5/5 bilateral upper extremities     -Sensation: intact to light touch bilateral upper extremities    -Pulses: 2+ radial pulses bilaterally, symmetric; extremities warm and well perfused, capillary refill brisk  RLE     -Inspection/palpation: well healed vertical scar of right knee, right knee nontender to palpation, no deformity, no swelling, no warmth, erythema, ecchymosis    -Compartments: soft, nontender bilaterally     -ROM: Full ROM right knee    -Motor:Quadriceps/TA/GS/EHL/FHL 5/5 bilateral lower extremities     -Sensation: SILT bilateral lower extremities        Imaging: XRAY RIght shoulder, Right humerus, right hand   PROCEDURE DATE:  05/30/2023      INTERPRETATION:  INDICATION: arm pain    COMPARISON: None available    FINDING: Four views of the right shoulder, two views of the right   humerus, and three views of the right hand demonstrates a rightreverse   shoulder arthroplasty. Suggestion of lucency surrounding the glenosphere   central peg. No periprosthetic fracture is identified. Visualized lung   field is clear. There is severe narrowing at the thumb, index finger, and   long finger metacarpal phalangeal joints. Mild narrowing of the PIP joint   spaces. Moderate narrowing at the STT joint.    IMPRESSION:  1.  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  2.  Degenerative changes of the hand.    --- End of Report ---    XR Right knee  PROCEDURE DATE:  05/30/2023      INTERPRETATION:  INDICATION: knee pain    COMPARISON: None available    FINDING: Four views of the right knee demonstrates no fracture. A total   knee arthroplasty is noted. There is no periprosthetic lucency. Small   fluid noted within the joint space. Vascular calcifications are identified    IMPRESSION:  Arthroplasty.  No fracture or loosening.    --- End of Report ---        A/P: 77yMale with soft tissue contusion of right shoulder     Weight bearing status- WBAT right shoulder  Physical therapy   Pain control  Conservative treatment rest, ice, elevation, NSAIDs  Follow up with surgeon outpatient   Patient history, exam, imaging and plan discussed with Dr. Tran who is in agreement    Ortho Pager [phone]   Orthopaedic Surgery Consult Note    CC: Right shoulder pain        HPI: Patient is a 77y old Male pmhx prostate ca, right reverse total shoulder replacement, bilateral knee replacements who presents with a chief complaint of right shoulder pain after a mechanical fall 3 days ago. Patient reports break on walker did not work and he fell forward onto his right shoulder and right knee while in a bakery. denies head strike. Patient states he was able to get up on his own. Reports deep non radiating right shoulder pain since fall. Denies right knee pain. Pt states shoulder pain is anterior worse with movements. Denies numbness or tingling into right upper extremity. Patient has a hx of right reverse total shoulder replacement 8 months ago with Dr. Samuel at [facility]. He is actively participating in physical therapy and has a follow up with him in 3 weeks. He states he takes otc pain reliever as needed but unsure of the name. Patient reports range of motion is consistent with prior to fall, however, he notes his limitation was not associated with pain prior to fall.       ROS: Positive for above  Denies fevers, chills.   All other systems negative, except for above.     Allergies  No Known Allergies  Intolerances      PAST MEDICAL & SURGICAL HISTORY:  Prostate cancer  Tendonitis  Arthritis  Vitamin B 12 deficiency  GERD (gastroesophageal reflux disease)  Diverticulosis  Adenoma of colon  DDD (degenerative disc disease), cervical  PVD (peripheral vascular disease)  Dyspepsia  History of total knee replacement      FAMILY HISTORY:  FH: myocardial infarction (Child)  son at age 34    FH: Parkinson's disease (Mother)  mother      Medications:     Vital Signs Last 24 Hrs  T(C): 36.9 (30 May 2023 10:25), Max: 36.9 (30 May 2023 10:25)  T(F): 98.4 (30 May 2023 10:25), Max: 98.4 (30 May 2023 10:25)  HR: 76 (30 May 2023 10:25) (76 - 76)  BP: 173/93 (30 May 2023 10:25) (173/93 - 173/93)  BP(mean): --  RR: 18 (30 May 2023 10:25) (18 - 18)  SpO2: 99% (30 May 2023 10:25) (99% - 99%)    Parameters below as of 30 May 2023 10:25  Patient On (Oxygen Delivery Method): room air        PE:  VS: stable, reviewed per nursing documentation  General: No acute distress, resting comfortably  Neuro: Alert, oriented x 3  Psych: Normal mood & affect  HEENT: normocephalic, atraumatic   Neck: trachea midline  Respiratory: nonlabored on room air   CV: no cyanosis, no peripheral edema    Skin: Warm, dry, no rash, no lesions, no abrasions no ecchymosis   MSK: atraumatic with exception of below  RUE     -Inspection/palpation:  well healed vertical scar over anterior right shoulder, + tenderness to palpation over the deltoid and anterior right shoulder, no swelling, no deformity, no erythema, ecchymosis, warmth    -Compartments: soft, nontender bilaterally     -ROM: limited AROM secondary to pain of the right shoulder, limited PROM secondary to pain of the right shoulder, flexion- 0-100 degrees, abduction- 0-90 degrees, external rotation neutral to 60 degrees and internal rotation neutral internal rotation to 50 degrees with pain    -Motor: triceps/biceps/ strength 5/5 bilateral upper extremities     -Sensation: intact to light touch bilateral upper extremities    -Pulses: 2+ radial pulses bilaterally, symmetric; extremities warm and well perfused, capillary refill brisk  RLE     -Inspection/palpation: well healed vertical scar of right knee, right knee nontender to palpation, no deformity, no swelling, no warmth, erythema, ecchymosis    -Compartments: soft, nontender bilaterally     -ROM: Full ROM right knee    -Motor:Quadriceps/TA/GS/EHL/FHL 5/5 bilateral lower extremities     -Sensation: SILT bilateral lower extremities        Imaging: XRAY RIght shoulder, Right humerus, right hand   PROCEDURE DATE:  05/30/2023      INTERPRETATION:  INDICATION: arm pain    COMPARISON: None available    FINDING: Four views of the right shoulder, two views of the right   humerus, and three views of the right hand demonstrates a rightreverse   shoulder arthroplasty. Suggestion of lucency surrounding the glenosphere   central peg. No periprosthetic fracture is identified. Visualized lung   field is clear. There is severe narrowing at the thumb, index finger, and   long finger metacarpal phalangeal joints. Mild narrowing of the PIP joint   spaces. Moderate narrowing at the STT joint.    IMPRESSION:  1.  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  2.  Degenerative changes of the hand.    --- End of Report ---    XR Right knee  PROCEDURE DATE:  05/30/2023      INTERPRETATION:  INDICATION: knee pain    COMPARISON: None available    FINDING: Four views of the right knee demonstrates no fracture. A total   knee arthroplasty is noted. There is no periprosthetic lucency. Small   fluid noted within the joint space. Vascular calcifications are identified    IMPRESSION:  Arthroplasty.  No fracture or loosening.    --- End of Report ---        A/P: 77yMale with soft tissue contusion of right shoulder   - XR of right shoulder without acute fracture or dislocation ?loosening of prosthesis, discussed with attending  - If pt requires walker would recommend Physical therapy prior to discharge  - Pain control  - Weight bearing status- WBAT right upper extremity   - Conservative treatment rest, ice, elevation, NSAIDs  - Follow up outpatient with Dr. Tran   - Patient history, exam, imaging and plan discussed with Dr. Tran who is in agreement of the plan    Ortho Pager [phone]

## 2023-05-30 NOTE — ED PROVIDER NOTE - CARE PROVIDER_API CALL
Cecil Tran  Orthopaedic Surgery  [address], [address] [place] [address]  Phone: [phone]  Fax: [phone]  Follow Up Time:

## 2023-05-30 NOTE — ED PROVIDER NOTE - PATIENT PORTAL LINK FT
You can access the FollowMyHealth Patient Portal offered by [facility] by registering at the following website: [email or web address]. By joining [facility]s FollowMyHealth portal, you will also be able to view your health information using other applications (apps) compatible with our system.

## 2023-06-09 NOTE — ED ADULT NURSE NOTE - NSSUHOSCREENINGYN_ED_ALL_ED
Called and spoke with nurse at [facility] and she states that patient had a terrible night.    States patient was sent back without any PRN medications.  States as soon as he got off but yesterday he was yelling.  States he was threatening to harm staff .  States patient was throwing feces and urine.  States he is delusion and having hallucinations.  States that they have a call out to rounding psych provider.  States they have a plan in place and if patient is as bad as he was yesterday they will be sending him into ER.     Arline Lucas RN on 6/9/2023 at 8:55 AM     Yes - the patient is able to be screened

## 2023-06-17 NOTE — ED PROVIDER NOTE - NS ED ROS FT
CONSTITUTIONAL: Denies fever and chills    RESPIRATORY: Denies SOB    CARDIOVASCULAR: Denies chest pain.    GASTROINTESTINAL: Denies abdominal pain, nausea, vomiting and diarrhea    MUSCULOSKELETAL: See HPI    NEUROLOGICAL: Denies headache and syncope. Male

## 2023-07-16 NOTE — ED ADULT TRIAGE NOTE - IDEAL BODY WEIGHT(KG)
SURGERY DAILY PROGRESS NOTE:     Subjective:  Patient seen and examined at bedside. Pt is doing well, complaining of RLE pain. Pt is tolerating diet and reports ileostomy function has slowed down. Pt is pending repeat RLE duplex.  AVSS. Denies any fevers, chills, n/v/d, chest pain or shortness of breath    Objective:    MEDICATIONS  (STANDING):  dextrose 5% + sodium chloride 0.45% with potassium chloride 20 mEq/L 1000 milliLiter(s) (125 mL/Hr) IV Continuous <Continuous>  diphenoxylate/atropine 1 Tablet(s) Oral every 6 hours  enoxaparin Injectable 90 milliGRAM(s) SubCutaneous every 12 hours  fentaNYL   Patch  12 MICROgram(s)/Hr 1 Patch Transdermal every 72 hours  gabapentin 100 milliGRAM(s) Oral every 8 hours  lidocaine   4% Patch 1 Patch Transdermal daily  loperamide 4 milliGRAM(s) Oral three times a day  naloxone Injectable 0.4 milliGRAM(s) IV Push once  psyllium Powder      psyllium Powder 1 Packet(s) Oral every 12 hours  sodium chloride 0.9%. 1000 milliLiter(s) (150 mL/Hr) IV Continuous <Continuous>    MEDICATIONS  (PRN):  acetaminophen     Tablet .. 650 milliGRAM(s) Oral every 6 hours PRN Temp greater or equal to 38C (100.4F), Mild Pain (1 - 3)  aluminum hydroxide/magnesium hydroxide/simethicone Suspension 30 milliLiter(s) Oral every 4 hours PRN Dyspepsia  HYDROmorphone   Tablet 2 milliGRAM(s) Oral every 4 hours PRN Moderate Pain (4 - 6)  HYDROmorphone   Tablet 4 milliGRAM(s) Oral every 4 hours PRN Severe Pain (7 - 10)  HYDROmorphone  Injectable 1 milliGRAM(s) IV Push three times a day PRN break through pain  melatonin 3 milliGRAM(s) Oral at bedtime PRN Insomnia  ondansetron Injectable 4 milliGRAM(s) IV Push every 8 hours PRN Nausea and/or Vomiting      Vital Signs Last 24 Hrs  T(C): 37 (15 Jul 2023 23:06), Max: 37.2 (15 Jul 2023 15:21)  T(F): 98.6 (15 Jul 2023 23:06), Max: 98.9 (15 Jul 2023 15:21)  HR: 79 (15 Jul 2023 23:06) (79 - 94)  BP: 114/65 (15 Jul 2023 23:06) (113/84 - 139/91)  BP(mean): --  RR: 18 (15 Jul 2023 15:21) (17 - 18)  SpO2: 99% (15 Jul 2023 23:06) (99% - 100%)    Parameters below as of 15 Jul 2023 15:21  Patient On (Oxygen Delivery Method): room air          PHYSICAL EXAM   Gen: well-appearing, in no acute distress  CV: pulse regularly present   Resp: airway patent, non-labored breathing  Abd: soft, NTND; no rebound or guarding. ileostomy in place with thick stool, R groin soft no sign of hematoma   ext. no cyanosis or edema, RLE DP and PT pulse palpable, LLE DP pulse dopplerable        I&O's Detail    14 Jul 2023 07:01  -  15 Jul 2023 07:00  --------------------------------------------------------  IN:  Total IN: 0 mL    OUT:    Ileostomy (mL): 900 mL  Total OUT: 900 mL    Total NET: -900 mL      15 Jul 2023 07:01  -  16 Jul 2023 03:23  --------------------------------------------------------  IN:  Total IN: 0 mL    OUT:    Ileostomy (mL): 850 mL  Total OUT: 850 mL    Total NET: -850 mL          Daily     Daily     LABS:                        12.2   15.69 )-----------( 221      ( 15 Jul 2023 08:28 )             38.1     07-15    137  |  105  |  10  ----------------------------<  106<H>  4.1   |  27  |  0.72    Ca    9.1      15 Jul 2023 08:28  Mg     2.1     07-14      PTT - ( 14 Jul 2023 07:05 )  PTT:34.2 sec  Urinalysis Basic - ( 15 Jul 2023 08:28 )    Color: x / Appearance: x / SG: x / pH: x  Gluc: 106 mg/dL / Ketone: x  / Bili: x / Urobili: x   Blood: x / Protein: x / Nitrite: x   Leuk Esterase: x / RBC: x / WBC x   Sq Epi: x / Non Sq Epi: x / Bacteria: x           66

## 2023-08-03 ENCOUNTER — EMERGENCY (EMERGENCY)
Facility: HOSPITAL | Age: 78
LOS: 1 days | Discharge: HOME | End: 2023-08-03
Attending: STUDENT IN AN ORGANIZED HEALTH CARE EDUCATION/TRAINING PROGRAM | Admitting: STUDENT IN AN ORGANIZED HEALTH CARE EDUCATION/TRAINING PROGRAM
Payer: MEDICARE

## 2023-08-03 VITALS
HEART RATE: 88 BPM | WEIGHT: 119.93 LBS | TEMPERATURE: 99 F | OXYGEN SATURATION: 97 % | SYSTOLIC BLOOD PRESSURE: 155 MMHG | RESPIRATION RATE: 16 BRPM | HEIGHT: 67 IN | DIASTOLIC BLOOD PRESSURE: 99 MMHG

## 2023-08-03 VITALS
SYSTOLIC BLOOD PRESSURE: 180 MMHG | HEART RATE: 80 BPM | RESPIRATION RATE: 16 BRPM | OXYGEN SATURATION: 98 % | DIASTOLIC BLOOD PRESSURE: 97 MMHG | TEMPERATURE: 98 F

## 2023-08-03 DIAGNOSIS — Z85.46 PERSONAL HISTORY OF MALIGNANT NEOPLASM OF PROSTATE: ICD-10-CM

## 2023-08-03 DIAGNOSIS — M19.90 UNSPECIFIED OSTEOARTHRITIS, UNSPECIFIED SITE: ICD-10-CM

## 2023-08-03 DIAGNOSIS — Z96.659 PRESENCE OF UNSPECIFIED ARTIFICIAL KNEE JOINT: Chronic | ICD-10-CM

## 2023-08-03 DIAGNOSIS — M25.511 PAIN IN RIGHT SHOULDER: ICD-10-CM
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
## 2023-08-03 DIAGNOSIS — Z87.39 PERSONAL HISTORY OF OTHER DISEASES OF THE MUSCULOSKELETAL SYSTEM AND CONNECTIVE TISSUE: ICD-10-CM
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
## 2023-08-03 DIAGNOSIS — Z96.611 PRESENCE OF RIGHT ARTIFICIAL SHOULDER JOINT: ICD-10-CM

## 2023-08-03 DIAGNOSIS — Z86.79 PERSONAL HISTORY OF OTHER DISEASES OF THE CIRCULATORY SYSTEM: ICD-10-CM

## 2023-08-03 DIAGNOSIS — Z96.653 PRESENCE OF ARTIFICIAL KNEE JOINT, BILATERAL: ICD-10-CM

## 2023-08-03 DIAGNOSIS — Z87.19 PERSONAL HISTORY OF OTHER DISEASES OF THE DIGESTIVE SYSTEM: ICD-10-CM

## 2023-08-03 PROCEDURE — 73030 X-RAY EXAM OF SHOULDER: CPT

## 2023-08-03 PROCEDURE — 99282 EMERGENCY DEPT VISIT SF MDM: CPT

## 2023-08-03 PROCEDURE — 96372 THER/PROPH/DIAG INJ SC/IM: CPT

## 2023-08-03 PROCEDURE — 99285 EMERGENCY DEPT VISIT HI MDM: CPT

## 2023-08-03 PROCEDURE — 71046 X-RAY EXAM CHEST 2 VIEWS: CPT

## 2023-08-03 PROCEDURE — 73060 X-RAY EXAM OF HUMERUS: CPT

## 2023-08-03 PROCEDURE — 73030 X-RAY EXAM OF SHOULDER: CPT | Mod: 26,RT

## 2023-08-03 PROCEDURE — 71046 X-RAY EXAM CHEST 2 VIEWS: CPT | Mod: 26

## 2023-08-03 PROCEDURE — 73060 X-RAY EXAM OF HUMERUS: CPT | Mod: 26,RT

## 2023-08-03 PROCEDURE — 99284 EMERGENCY DEPT VISIT MOD MDM: CPT | Mod: 25

## 2023-08-03 RX ORDER — ACETAMINOPHEN 500 MG
650 TABLET ORAL ONCE
Refills: 0 | Status: COMPLETED | OUTPATIENT
Start: 2023-08-03 | End: 2023-08-03

## 2023-08-03 RX ORDER — KETOROLAC TROMETHAMINE 30 MG/ML
30 SYRINGE (ML) INJECTION ONCE
Refills: 0 | Status: DISCONTINUED | OUTPATIENT
Start: 2023-08-03 | End: 2023-08-03

## 2023-08-03 RX ADMIN — Medication 30 MILLIGRAM(S): at 17:04

## 2023-08-03 RX ADMIN — Medication 30 MILLIGRAM(S): at 17:19

## 2023-08-03 RX ADMIN — Medication 650 MILLIGRAM(S): at 14:18

## 2023-08-03 NOTE — ED PROVIDER NOTE - NSFOLLOWUPINSTRUCTIONS_ED_ALL_ED_FT
1. You were seen for shoulder pain. A copy of any of your resulted labs, imaging, and findings have been provided to you. Make sure to view any test results that may not have yet resulted at the time of your discharge by creating a FollowMyHealth account at: [email or web address] to sign up for FollowMyHealth. Please review all of your lab, imaging, and all other results in their entirety with your primary care doctor.   2. Continue to take your home medications as prescribed. Take over the counter motrin 600 mg with food every six hours (do not exceed 3,200 mg in a 24 hour period) and supplement with tylenol 650 mg every six hours (do not exceed 4000 mg of tylenol in a 24 hour period and do not drink alcohol while taking tylenol) between the motrin dosages to have a layered effect. Consult a pharmacist or your primary care doctor with any questions.   3. Follow up with your orthopedic doctor and primary care doctor within 48 hours to assess the symptoms you were seen for in the emergency department and to review all results from your visit. If you don't have a doctor, call [phone] to make an appointment.  4. Return immediately to the emergency department for new, persistent, or worsening symptoms or signs. Return immediately to the emergency department if you have chest pain, shortness of breath, loss of consciousness,  difficulty moving or feeling her arms, or skin changes  5. For your for health, you should make healthy food choices and be physically active. Also, you should not smoke or use drugs, and you should not drink alcohol in excess. Please visit [email or web address] for resources and more information. Please reach out to Tayler Cornejo ([facility] ED clinical referral coordinator) to assist you with your follow-up appointment with orthopedic surgery.     Monday - Friday 11am-7pm  [phone]  [email or web address]     1. You were seen for shoulder pain. A copy of any of your resulted labs, imaging, and findings have been provided to you. Make sure to view any test results that may not have yet resulted at the time of your discharge by creating a FollowMyHealth account at: [email or web address] to sign up for FollowMyHealth. Please review all of your lab, imaging, and all other results in their entirety with your primary care doctor.   2. Continue to take your home medications as prescribed. Take over the counter motrin 600 mg with food every six hours (do not exceed 3,200 mg in a 24 hour period) and supplement with tylenol 650 mg every six hours (do not exceed 4000 mg of tylenol in a 24 hour period and do not drink alcohol while taking tylenol) between the motrin dosages to have a layered effect. Consult a pharmacist or your primary care doctor with any questions.   3. Follow up with your orthopedic doctor and primary care doctor within 48 hours to assess the symptoms you were seen for in the emergency department and to review all results from your visit. If you don't have a doctor, call [phone] to make an appointment.  4. Return immediately to the emergency department for new, persistent, or worsening symptoms or signs. Return immediately to the emergency department if you have chest pain, shortness of breath, loss of consciousness,  difficulty moving or feeling her arms, or skin changes  5. For your for health, you should make healthy food choices and be physically active. Also, you should not smoke or use drugs, and you should not drink alcohol in excess. Please visit [email or web address] for resources and more information.

## 2023-08-03 NOTE — ED PROVIDER NOTE - PROGRESS NOTE DETAILS
MD Leni: XR R shoulder: Right shoulder:    Reverse total shoulder arthroplasty is in near-anatomic alignment.   Nonspecific stable lucency surrounding the peg of the glenosphere, which   can be seen in the setting of loosening. Correlation with immediate   postoperative radiographs would be helpful.    No acute displaced fracture. Mild/moderate acromioclavicular arthrosis.    Right humerus:    Most distal aspect of the humerus is off the field-of-view on the AP   view, limiting evaluation in this region.    No acute displaced fracture.    pt evaluated By orthopedics who recommend no acute surgical intervention at this time, okay patient to be discharged with outpatient orthopedic follow-up, patient has upcoming appointment with his orthopedic surgeon to give Toradol and dc

## 2023-08-03 NOTE — ED ADULT NURSE NOTE - NSFALLUNIVINTERV_ED_ALL_ED
Bed/Stretcher in lowest position, wheels locked, appropriate side rails in place/Call bell, personal items and telephone in reach/Instruct patient to call for assistance before getting out of bed/chair/stretcher/Non-slip footwear applied when patient is off stretcher/[place] to call system/Physically safe environment - no spills, clutter or unnecessary equipment/Purposeful proactive rounding/Room/bathroom lighting operational, light cord in reach

## 2023-08-03 NOTE — ED ADULT TRIAGE NOTE - CHIEF COMPLAINT QUOTE
Pt presents to ED c/o right shoulder pain x 3 weeks. Had right shoulder surgery last October. Had a mechanical fall 3 weeks ago, and has been having pain in the R shoulder since.

## 2023-08-03 NOTE — ED PROVIDER NOTE - MDM ORDERS SUBMITTED SELECTION
Imaging Studies/Medications Area L Indication Text: Tumors in this location are included in Area L (trunk and extremities).  Mohs surgery is indicated for larger tumors, or tumors with aggressive histologic features, in these anatomic locations.

## 2023-08-03 NOTE — ED PROVIDER NOTE - PHYSICAL EXAMINATION
GENERAL:  Awake, alert and in NAD, non-toxic appearing  ENMT: Airway patent  EYES: conjunctiva clear  CARDIAC: Regular rate, regular rhythm.  Heart sounds S1, S2, no S3, S4. No murmurs, rubs or gallops.  RESPIRATORY: Breath sounds clear and equal in bilateral anterior lung fields, no wheezes/ronchi/crackles/stridor; pt breathing and speaking comfortably with no increased WOB, no accessory mm. use, no intercostal retractions, no nasal flaring  GI: abdomen soft, non-distended, non-tender, no rebound or guarding.  SKIN: warm and dry, no rashes  PSYCH: awake, alert, calm and cooperative  EXTREMITIES: no ble edema  BUE: 5/5 motor strength bilat deltoid, biceps, triceps, wrist flexors/extensors, hand . sensation intact to light touch bilat C5-T1; 2+ radial pulses bilat; compartment soft, skin warm and dry   no deformity or point ttp or skin changes over R shoulder,  slightly limited range of motion of right shoulder due to pain  NEURO: gcs 15

## 2023-08-03 NOTE — ED ADULT NURSE NOTE - NS ED NURSE RECORD ANOTHER HT AND WT
1. Have you been to the ER, urgent care clinic since your last visit? Hospitalized since your last visit? Yes ER 03/29/2022 Hemorrhoid    2. Have you seen or consulted any other health care providers outside of the [address] [place] [address] since your last visit? Include any pap smears or colon screening.  Dr. Miguel Callejas   Colonoscopy 04/01/2022 repeat in 3 years Yes

## 2023-08-03 NOTE — ED PROVIDER NOTE - CLINICAL SUMMARY MEDICAL DECISION MAKING FREE TEXT BOX
77-year-old male with past medical history of diverticulosis, dyspepsia, prostate cancer (status post radiation therapy, in remission), peripheral vascular disease, degenerative disc disease, arthritis, tendinitis, PSH of right reverse total shoulder replacement, bilateral knee replacements presents with 3-week history of right shoulder pain that started after he had a mechanical trip and fall 3 weeks ago and broke his fall with his arms. On exam, blood pressure 155/99, vital signs otherwise within normal meds, bilateral upper extremities are neurovascularly intact, slightly limited range of motion of right shoulder secondary to pain.       DDx: Fracture versus dislocation versus sprain versus strain.  Pain started after mechanical fall, patient has no associated chest pain, shortness of breath, do not suspect any cardiac etiology of this time,  His symptoms are consistent with MSK pain after trauma.      Plan:  –X-ray:  no acute fracture or dislocation visualized  –Tylenol

## 2023-08-03 NOTE — ED PROVIDER NOTE - NS ED ROS FT
Positive: Right shoulder pain   negative: Fever, chills, congestion, cough, chest pain, shortness of breath, nausea, vomiting, diarrhea, abdominal pain, dysuria, hematuria, leg edema, rash, focal numbness or weakness

## 2023-08-03 NOTE — CONSULT NOTE ADULT - SUBJECTIVE AND OBJECTIVE BOX
83 y.o M, right hand dominant, c/o right shoulder pain x approx. 3 days. Hx of right reverse total shoulder arthroplasty November 2022 by Jimmie Clinton at [facility]. No recent trauma, fall, injury, or travel. Denies fever or other illness. Pain primarily anterior shoulder. No numbess, tingling or loss of strength. Some limitation of motion secondary to pain. Overall range of motion has decreased/increased stiffness over past few months. Had a fall in May 2023, shoulder evaluated at [facility] ER, prosthesis intact, treated with Toradol which helped acute pain. Took tylenol/motrin post discharge from that fall with improvement in pain. Has been unable to follow up with surgeon, appointment scheduled for 1 month from now. Has not had PT/OT in recent months, does own exercises. Overall did well after initial surgery with no complications.  Ambulates with walker.     PMH: HTN  Home Medications:  Flomax 0.4 mg oral capsule: 1 cap(s) orally once a day (08 Feb 2021 13:32)  Lupron: subcutaneous every 3 months (08 Feb 2021 13:32)    ICU Vital Signs Last 24 Hrs  T(C): 37 (03 Aug 2023 13:21), Max: 37 (03 Aug 2023 13:21)  T(F): 98.6 (03 Aug 2023 13:21), Max: 98.6 (03 Aug 2023 13:21)  HR: 88 (03 Aug 2023 13:21) (88 - 88)  BP: 155/99 (03 Aug 2023 13:21) (155/99 - 155/99)  BP(mean): --  ABP: --  ABP(mean): --  RR: 16 (03 Aug 2023 13:21) (16 - 16)  SpO2: 97% (03 Aug 2023 13:21) (97% - 97%)    O2 Parameters below as of 03 Aug 2023 13:21  Patient On (Oxygen Delivery Method): room air    Right shoulder:  incision well healed, no open wounds, no erythema , no warmth  muscular atrophy but symmetric, some anatomic distortion of bony landmarks but in context of previous reverse total shoulder   mild tenderness to palpation anterior shoulder  rotators intact  deltoid,ER,IR muscle strength intact,  = b/l  neuromotor intact distally  radial 2+  forward elevation to approx 80* (states previously about 110*) , ER 30*, IR 60*, ABD 50*    Xray right shoulder/humerus: prosthesis intact, no fracture or dislocation. Questionable lucency surrounding peg of glenoid implant    A/P: Acute on Chronic right shoulder pain in setting of previous right shoulder arthroplasty  -no clinical suspicion of septic shoulder  -recc Toraol x 1 for acute pain if no contraindication. Pain control at home with Tylenol/NSAID  -needs to follow up with primary surgeon. Should compare to prior xrays to rule out/work up aseptic loosening. Urged patient to follow up within 1-2 weeks  -patient express some interest in establishing new surgeon for follow up. Case discussed with Dr. Olivier but if wants to follow up please provide contact info for Dr. Cecil Tran or Dr. GUILLAUME Shannon  - [facility] stable for discharge with outpatient follow up    Justo Grubbs PA-C  [phone]          77 y.o M, right hand dominant, c/o right shoulder pain x approx. 3 days. Hx of right reverse total shoulder arthroplasty November 2022 by Jimmie Clinton at [facility]. No recent trauma, fall, injury, or travel. Denies fever or other illness. Pain primarily anterior shoulder. No numbess, tingling or loss of strength. Some limitation of motion secondary to pain. Overall range of motion has decreased/increased stiffness over past few months. Had a fall in May 2023, shoulder evaluated at [facility] ER, prosthesis intact, treated with Toradol which helped acute pain. Took tylenol/motrin post discharge from that fall with improvement in pain. Has been unable to follow up with surgeon, appointment scheduled for 1 month from now. Has not had PT/OT in recent months, does own exercises. Overall did well after initial surgery with no complications.  Ambulates with walker.     PMH: HTN  Home Medications:  Flomax 0.4 mg oral capsule: 1 cap(s) orally once a day (08 Feb 2021 13:32)  Lupron: subcutaneous every 3 months (08 Feb 2021 13:32)    ICU Vital Signs Last 24 Hrs  T(C): 37 (03 Aug 2023 13:21), Max: 37 (03 Aug 2023 13:21)  T(F): 98.6 (03 Aug 2023 13:21), Max: 98.6 (03 Aug 2023 13:21)  HR: 88 (03 Aug 2023 13:21) (88 - 88)  BP: 155/99 (03 Aug 2023 13:21) (155/99 - 155/99)  BP(mean): --  ABP: --  ABP(mean): --  RR: 16 (03 Aug 2023 13:21) (16 - 16)  SpO2: 97% (03 Aug 2023 13:21) (97% - 97%)    O2 Parameters below as of 03 Aug 2023 13:21  Patient On (Oxygen Delivery Method): room air    Right shoulder:  incision well healed, no open wounds, no erythema , no warmth  muscular atrophy but symmetric, some anatomic distortion of bony landmarks but in context of previous reverse total shoulder   mild tenderness to palpation anterior shoulder  rotators intact  deltoid,ER,IR muscle strength intact,  = b/l  neuromotor intact distally  radial 2+  forward elevation to approx 80* (states previously about 110*) , ER 30*, IR 60*, ABD 50*    Xray right shoulder/humerus: prosthesis intact, no fracture or dislocation. Questionable lucency surrounding peg of glenoid implant    A/P: Acute on Chronic right shoulder pain in setting of previous right shoulder arthroplasty  -no clinical suspicion of septic shoulder  -recc Toraol x 1 for acute pain if no contraindication. Pain control at home with Tylenol/NSAID  -needs to follow up with primary surgeon. Should compare to prior xrays to rule out/work up aseptic loosening. Urged patient to follow up within 1-2 weeks  -patient express some interest in establishing new surgeon for follow up. Case discussed with Dr. Olivier but if wants to follow up please provide contact info for Dr. Cecil Tran or Dr. GUILLAUME Shannon  - [facility] stable for discharge with outpatient follow up    Justo Grubbs PA-C  [phone]

## 2023-08-03 NOTE — ED PROVIDER NOTE - OBJECTIVE STATEMENT
77-year-old male with past medical history of diverticulosis, dyspepsia, prostate cancer (status post radiation therapy, in remission), peripheral vascular disease, degenerative disc disease, arthritis, tendinitis, PSH of right reverse total shoulder replacement, bilateral knee replacements presents with 3-week history of right shoulder pain that started after he had a mechanical trip and fall 3 weeks ago and broke his fall with his arms.  Denies head trauma or LOC.  Denies focal numbness or weakness.  Denies associated chest pain, shortness of breath.

## 2023-08-03 NOTE — ED PROVIDER NOTE - PATIENT PORTAL LINK FT
You can access the FollowMyHealth Patient Portal offered by [facility] by registering at the following website: [email or web address]. By joining [employer]’s FollowMyHealth portal, you will also be able to view your health information using other applications (apps) compatible with our system.

## 2023-10-29 ENCOUNTER — EMERGENCY (EMERGENCY)
Facility: HOSPITAL | Age: 78
LOS: 1 days | Discharge: HOME | End: 2023-10-29
Attending: EMERGENCY MEDICINE | Admitting: EMERGENCY MEDICINE
Payer: MEDICARE

## 2023-10-29 VITALS
DIASTOLIC BLOOD PRESSURE: 83 MMHG | WEIGHT: 121.92 LBS | TEMPERATURE: 98 F | SYSTOLIC BLOOD PRESSURE: 161 MMHG | RESPIRATION RATE: 17 BRPM | HEART RATE: 104 BPM | HEIGHT: 67 IN | OXYGEN SATURATION: 100 %

## 2023-10-29 DIAGNOSIS — Z96.659 PRESENCE OF UNSPECIFIED ARTIFICIAL KNEE JOINT: Chronic | ICD-10-CM

## 2023-10-29 LAB
APPEARANCE UR: ABNORMAL
APPEARANCE UR: ABNORMAL
BACTERIA # UR AUTO: ABNORMAL /HPF
BACTERIA # UR AUTO: ABNORMAL /HPF
BILIRUB UR-MCNC: NEGATIVE — SIGNIFICANT CHANGE UP
BILIRUB UR-MCNC: NEGATIVE — SIGNIFICANT CHANGE UP
CAST: 6 /LPF — HIGH (ref 0–4)
CAST: 6 /LPF — HIGH (ref 0–4)
COLOR SPEC: YELLOW — SIGNIFICANT CHANGE UP
COLOR SPEC: YELLOW — SIGNIFICANT CHANGE UP
DIFF PNL FLD: ABNORMAL
DIFF PNL FLD: ABNORMAL
GLUCOSE UR QL: NEGATIVE MG/DL — SIGNIFICANT CHANGE UP
GLUCOSE UR QL: NEGATIVE MG/DL — SIGNIFICANT CHANGE UP
KETONES UR-MCNC: ABNORMAL MG/DL
KETONES UR-MCNC: ABNORMAL MG/DL
LEUKOCYTE ESTERASE UR-ACNC: ABNORMAL
LEUKOCYTE ESTERASE UR-ACNC: ABNORMAL
NITRITE UR-MCNC: POSITIVE
NITRITE UR-MCNC: POSITIVE
PH UR: 6.5 — SIGNIFICANT CHANGE UP (ref 5–8)
PH UR: 6.5 — SIGNIFICANT CHANGE UP (ref 5–8)
PROT UR-MCNC: 100 MG/DL
PROT UR-MCNC: 100 MG/DL
RBC CASTS # UR COMP ASSIST: 733 /HPF — HIGH (ref 0–4)
RBC CASTS # UR COMP ASSIST: 733 /HPF — HIGH (ref 0–4)
SP GR SPEC: 1.02 — SIGNIFICANT CHANGE UP (ref 1–1.03)
SP GR SPEC: 1.02 — SIGNIFICANT CHANGE UP (ref 1–1.03)
SQUAMOUS # UR AUTO: 1 /HPF — SIGNIFICANT CHANGE UP (ref 0–5)
SQUAMOUS # UR AUTO: 1 /HPF — SIGNIFICANT CHANGE UP (ref 0–5)
UROBILINOGEN FLD QL: 1 MG/DL — SIGNIFICANT CHANGE UP (ref 0.2–1)
UROBILINOGEN FLD QL: 1 MG/DL — SIGNIFICANT CHANGE UP (ref 0.2–1)
WBC UR QL: 207 /HPF — HIGH (ref 0–5)
WBC UR QL: 207 /HPF — HIGH (ref 0–5)

## 2023-10-29 PROCEDURE — 51702 INSERT TEMP BLADDER CATH: CPT

## 2023-10-29 PROCEDURE — 99283 EMERGENCY DEPT VISIT LOW MDM: CPT | Mod: 25

## 2023-10-29 PROCEDURE — 87086 URINE CULTURE/COLONY COUNT: CPT

## 2023-10-29 PROCEDURE — 81001 URINALYSIS AUTO W/SCOPE: CPT

## 2023-10-29 PROCEDURE — 99284 EMERGENCY DEPT VISIT MOD MDM: CPT

## 2023-10-29 RX ORDER — CEPHALEXIN 500 MG
500 CAPSULE ORAL ONCE
Refills: 0 | Status: COMPLETED | OUTPATIENT
Start: 2023-10-29 | End: 2023-10-29

## 2023-10-29 RX ORDER — CEPHALEXIN 500 MG
1 CAPSULE ORAL
Qty: 14 | Refills: 0
Start: 2023-10-29 | End: 2023-11-04

## 2023-10-29 RX ADMIN — Medication 500 MILLIGRAM(S): at 13:50

## 2023-10-29 NOTE — ED ADULT NURSE NOTE - DRUG PRE-SCREENING (DAST -1)
Telephone Encounter by Mckenzie Shultz Statement Selected

## 2023-10-29 NOTE — ED ADULT NURSE NOTE - OBJECTIVE STATEMENT
Pt. a&ox4 ambulatory with steady gait, hx of chronic martinez x 3 yrs, gets changed q month outpt urology, missed his routine appt. last week, has scheduled for this Tuesday, came to ED because he began experiencing dysuria with increasing bladder pressure / lower abd. pain with little to no urine in his leg bag since midnight last night. Pt. denies f/c, hx of UTIs in the past d/t chronic need for martinez, denies n/v/d, cp, SOB.   Martinez dc'ed and changed here in ED for same 16. Fr. size. documented in flowsheets. Pain immediately resolved upon new insertion, urine output present. Pt. tolerated well.

## 2023-10-29 NOTE — ED PROVIDER NOTE - CLINICAL SUMMARY MEDICAL DECISION MAKING FREE TEXT BOX
78M PMH HTN, diverticulosis, prostate cancer s/p radiation, chronic indwelling martinez, p/w increasing lower abd pressure and urge to uniate but no urine draining into bag - last drained urine ~midnight. No other systemic symptoms. Was supposed to have martinez exchanged 1w ago but missed appointment. Hx of similar in the past when martinez stays in for more than 4 weeks.   Mild triage tachycardia, mild HTN, other labs grossly wnl.   ddx: Likely martinez malfunction/retention.   Martinez exchange, UA/cx, reassess.

## 2023-10-29 NOTE — ED ADULT TRIAGE NOTE - CHIEF COMPLAINT QUOTE
Pt c/o dysuria x 12am. Pt reports changing bag yesterday, a little bit of urine came out, then stopped. Pt reports having catheter for 2-3 years, gets martinez changed every month. Pt supposed to get catheter changed last week, appointment moved to this week. C/o lower abdominal pain. PMH HTN.

## 2023-10-29 NOTE — ED PROVIDER NOTE - OBJECTIVE STATEMENT
78M PMH HTN, diverticulosis, prostate cancer s/p radiation, chronic indwelling martinez, p/w increasing lower abd pressure and urge to uniate but no urine draining into bag - last drained urine ~midnight. No other systemic symptoms. Was supposed to have martinez exchanged 1w ago but missed appointment. Hx of similar in the past when martinez stays in for more than 4 weeks.   Denies fevers, chills, nausea, vomiting, diarrhea, black stool, bloody stool, focal weakness, focal numbness, lightheadedness, back pain, SOB, CP, rhinorrhea, nasal congestion, sore throat, cough, testicular pain, penile pain.

## 2023-10-29 NOTE — ED PROVIDER NOTE - NSFOLLOWUPINSTRUCTIONS_ED_ALL_ED_FT
Can take tylenol every 6hrs as needed for pain.    Stay well hydrated.    Return for fevers, persistent vomit, uncontrolled pain, worsening breathing, worsening lightheaded, unable to urinate, spreading redness.    Follow up with primary doctor within 1-2 days.     Follow up with urologist.    What is urinary retention?   Urinary retention is a condition that develops when your bladder does not empty completely when you urinate.    What causes urinary retention?     An enlarged prostate  Blockages, such as a stone, growth, or narrowing of your urethra  A weak bladder muscle  Nerve damage from diabetes, stroke, or spinal cord injury  Bladder diverticula, which are pockets of urine that form in your bladder and do not empty  Certain medicines, such as narcotics, antihistamines, or antidepressants    What are the signs and symptoms of urinary retention?     Frequent urination, or the urge to urinate right after you finish  An urge to urinate, but your urine does not come out or dribbles out slowly and weakly  Frequent urine leaks that happen during the day or while you sleep  Pain or pressure when you urinate  Pain or stiffness in your abdomen, lower back, hips, or upper thighs  Blood in your urine    How is urinary retention diagnosed? Your healthcare provider will ask about your health history and the medicines you take. He will press or tap on your lower abdomen. You may need any of the following tests:   A digital rectal exam is when healthcare providers carefully feel the size of your prostate.  A post void residual test will show how much urine is left in your bladder after you urinate. You will be asked to urinate and then healthcare providers will use a small ultrasound machine to check how much urine is left in your bladder.  Blood or urine tests may show infection or prostate specific antigen (PSA) levels. PSA may be elevated in prostate cancer.  An ultrasound uses sound waves to show pictures on a monitor. An ultrasound may be done to show bladder stones, infection, or other problems.  A CT scan, or CAT scan, is a type of x-ray that is taken of your prostate, kidneys, and bladder. The pictures may show what is causing your urinary retention. You may be given a dye before the pictures are taken to help healthcare providers see the pictures better. Tell the healthcare provider if you have ever had an allergic reaction to contrast dye.    How is urinary retention treated?     A Dawkins catheter is a tube put into your bladder to drain urine into a bag. Keep the bag below your waist. This will prevent urine from flowing back into your bladder and causing an infection or other problems. Also, keep the tube free of kinks so the urine will drain properly. Do not pull on the catheter. This can cause pain and bleeding, and may cause the catheter to come out.     Medicines can help decrease the size of your prostate, fight infection, and help you urinate more easily.  Surgery may be needed to treat the condition that is causing your urinary retention.     When should I contact my healthcare provider?     You have a fever.  You have pain when you urinate.  You have blood in your urine.  You have problems with your catheter.  You have questions or concerns about your condition or care.    When should I seek immediate care or call 911?   You have severe abdominal pain.  You are breathing faster than usual.  Your heartbeat is faster than usual.  Your face, hands, feet, or ankles are swollen. Can take tylenol every 6hrs as needed for pain.    Stay well hydrated.    Return for fevers, persistent vomit, uncontrolled pain, worsening breathing, worsening lightheaded, unable to urinate, spreading redness, penis or testicle pain/swelling.    Follow up with primary doctor within 1-2 days.     Follow up with your urologist.    Take antibiotics as prescribed for possible UTI.     Urinary Tract Infection    A urinary tract infection (UTI) is an infection of any part of the urinary tract, which includes the kidneys, ureters, bladder, and urethra. Risk factors include ignoring your need to urinate, wiping back to front if female, being an uncircumcised male, and having diabetes or a weak immune system. Symptoms include frequent urination, pain or burning with urination, foul smelling urine, cloudy urine, pain in the lower abdomen, blood in the urine, and fever. If you were prescribed an antibiotic medicine, take it as told by your health care provider. Do not stop taking the antibiotic even if you start to feel better.    SEEK IMMEDIATE MEDICAL CARE IF YOU HAVE ANY OF THE FOLLOWING SYMPTOMS: severe back or abdominal pain, fever, inability to keep fluids or medicine down, dizziness/lightheadedness, or a change in mental status.     What is urinary retention?   Urinary retention is a condition that develops when your bladder does not empty completely when you urinate.    What causes urinary retention?     An enlarged prostate  Blockages, such as a stone, growth, or narrowing of your urethra  A weak bladder muscle  Nerve damage from diabetes, stroke, or spinal cord injury  Bladder diverticula, which are pockets of urine that form in your bladder and do not empty  Certain medicines, such as narcotics, antihistamines, or antidepressants    What are the signs and symptoms of urinary retention?     Frequent urination, or the urge to urinate right after you finish  An urge to urinate, but your urine does not come out or dribbles out slowly and weakly  Frequent urine leaks that happen during the day or while you sleep  Pain or pressure when you urinate  Pain or stiffness in your abdomen, lower back, hips, or upper thighs  Blood in your urine    How is urinary retention diagnosed? Your healthcare provider will ask about your health history and the medicines you take. He will press or tap on your lower abdomen. You may need any of the following tests:   A digital rectal exam is when healthcare providers carefully feel the size of your prostate.  A post void residual test will show how much urine is left in your bladder after you urinate. You will be asked to urinate and then healthcare providers will use a small ultrasound machine to check how much urine is left in your bladder.  Blood or urine tests may show infection or prostate specific antigen (PSA) levels. PSA may be elevated in prostate cancer.  An ultrasound uses sound waves to show pictures on a monitor. An ultrasound may be done to show bladder stones, infection, or other problems.  A CT scan, or CAT scan, is a type of x-ray that is taken of your prostate, kidneys, and bladder. The pictures may show what is causing your urinary retention. You may be given a dye before the pictures are taken to help healthcare providers see the pictures better. Tell the healthcare provider if you have ever had an allergic reaction to contrast dye.    How is urinary retention treated?     A Dawkins catheter is a tube put into your bladder to drain urine into a bag. Keep the bag below your waist. This will prevent urine from flowing back into your bladder and causing an infection or other problems. Also, keep the tube free of kinks so the urine will drain properly. Do not pull on the catheter. This can cause pain and bleeding, and may cause the catheter to come out.     Medicines can help decrease the size of your prostate, fight infection, and help you urinate more easily.  Surgery may be needed to treat the condition that is causing your urinary retention.     When should I contact my healthcare provider?     You have a fever.  You have pain when you urinate.  You have blood in your urine.  You have problems with your catheter.  You have questions or concerns about your condition or care.    When should I seek immediate care or call 911?   You have severe abdominal pain.  You are breathing faster than usual.  Your heartbeat is faster than usual.  Your face, hands, feet, or ankles are swollen.

## 2023-10-29 NOTE — ED PROVIDER NOTE - PATIENT PORTAL LINK FT
You can access the FollowMyHealth Patient Portal offered by [facility] by registering at the following website: [email or web address]. By joining [employer]’s FollowMyHealth portal, you will also be able to view your health information using other applications (apps) compatible with our system.

## 2023-10-29 NOTE — ED PROVIDER NOTE - PHYSICAL EXAMINATION
abd: suprapubic ttp. abd: suprapubic ttp. rest of abd soft NTND.   Dawkins appears in place, no urine in bag

## 2023-10-29 NOTE — ED ADULT NURSE NOTE - NSFALLUNIVINTERV_ED_ALL_ED
Bed/Stretcher in lowest position, wheels locked, appropriate side rails in place/Call bell, personal items and telephone in reach/Instruct patient to call for assistance before getting out of bed/chair/stretcher/Non-slip footwear applied when patient is off stretcher/[place] to call system/Physically safe environment - no spills, clutter or unnecessary equipment/Purposeful proactive rounding/Room/bathroom lighting operational, light cord in reach

## 2023-10-29 NOTE — ED PROVIDER NOTE - PROGRESS NOTE DETAILS
Melopfish: Dawkins placed by RN, urine immediately draining, pt felt immediate relief. Klepfish: Pt feeling much better, several hundred cc output. UA+, will tx for possible UTI. Discussed importance of outpt follow up and return precautions. Clinically no indication for further emergent ED workup or hospitalization at this time. Stable for dc, outpt f/u.

## 2023-10-29 NOTE — ED PROVIDER NOTE - CARE PLAN
1 Principal Discharge DX:	Urinary retention   Principal Discharge DX:	Urinary retention  Secondary Diagnosis:	Urinary tract infection

## 2023-10-30 LAB
CULTURE RESULTS: SIGNIFICANT CHANGE UP
CULTURE RESULTS: SIGNIFICANT CHANGE UP
SPECIMEN SOURCE: SIGNIFICANT CHANGE UP
SPECIMEN SOURCE: SIGNIFICANT CHANGE UP

## 2023-11-01 DIAGNOSIS — I10 ESSENTIAL (PRIMARY) HYPERTENSION: ICD-10-CM

## 2023-11-01 DIAGNOSIS — Z92.3 PERSONAL HISTORY OF IRRADIATION: ICD-10-CM

## 2023-11-01 DIAGNOSIS — R00.0 TACHYCARDIA, UNSPECIFIED: ICD-10-CM

## 2023-11-01 DIAGNOSIS — Z85.46 PERSONAL HISTORY OF MALIGNANT NEOPLASM OF PROSTATE: ICD-10-CM

## 2023-11-01 DIAGNOSIS — K57.90 DIVERTICULOSIS OF INTESTINE, PART UNSPECIFIED, WITHOUT PERFORATION OR ABSCESS WITHOUT BLEEDING: ICD-10-CM

## 2023-11-01 DIAGNOSIS — N39.0 URINARY TRACT INFECTION, SITE NOT SPECIFIED: ICD-10-CM

## 2023-11-01 DIAGNOSIS — R10.30 LOWER ABDOMINAL PAIN, UNSPECIFIED: ICD-10-CM

## 2023-11-15 NOTE — ED PROVIDER NOTE - GENITOURINARY [+], MLM
Pre-procedure lab exam, Hyperlipidemia LDL goal <100, Primary hypertension, and Controlled type 2 diabetes mellitus without complication, unspecified whether long term insulin use ([address]) long term insulin use ([address])        Cardiac History:   No specialty comments available. Future Appointments   Date Time Provider [facility] [address]   12/1/2023 11:15 AM [facility] MRI 1 [facility]   12/7/2023 11:00 AM BS [facility] 3 [facility]  AMB   1/17/2024 11:00 AM Veronica Simmons, APRN - NP [facility] BS AMB      Patient Care Team:  Landon Pop MD as PCP - Verena Van 4/5/2010    Diabetes ([address])     Esophageal dilatation 05/01/2017    GERD (gastroesophageal reflux disease)
DIFFICULTY URINATING

## 2024-02-19 NOTE — ED PROVIDER NOTE - DISPOSITION TYPE
Airway  Date/Time: 2/19/2024 12:49 PM  Urgency: elective    Airway not difficult    Staffing  Performed: resident   Authorized by: Cate Vazquez MD    Performed by: Donny Wick DO  Patient location during procedure: OR    Indications and Patient Condition  Indications for airway management: anesthesia  Spontaneous Ventilation: absent  Sedation level: deep  Preoxygenated: yes  Patient position: sniffing  MILS maintained throughout  Mask difficulty assessment: 1 - vent by mask  Planned trial extubation    Final Airway Details  Final airway type: endotracheal airway      Successful airway: ETT  Cuffed: yes   Successful intubation technique: direct laryngoscopy  Facilitating devices/methods: intubating stylet  Endotracheal tube insertion site: oral  Blade: Darinel  Blade size: #3  ETT size (mm): 7.0  Cormack-Lehane Classification: grade IIa - partial view of glottis (View obstructed by aspirate)  Placement verified by: chest auscultation   Placement verification comments: (Colorimetry)  Measured from: teeth  ETT to teeth (cm): 21  Number of attempts at approach: 1           ADMIT

## 2024-07-01 ENCOUNTER — EMERGENCY (EMERGENCY)
Facility: HOSPITAL | Age: 79
LOS: 1 days | Discharge: HOME | End: 2024-07-01
Admitting: EMERGENCY MEDICINE
Payer: MEDICARE

## 2024-07-01 VITALS
TEMPERATURE: 99 F | SYSTOLIC BLOOD PRESSURE: 179 MMHG | OXYGEN SATURATION: 94 % | HEART RATE: 90 BPM | RESPIRATION RATE: 18 BRPM | DIASTOLIC BLOOD PRESSURE: 86 MMHG

## 2024-07-01 DIAGNOSIS — Z96.659 PRESENCE OF UNSPECIFIED ARTIFICIAL KNEE JOINT: Chronic | ICD-10-CM

## 2024-07-01 PROCEDURE — 99283 EMERGENCY DEPT VISIT LOW MDM: CPT | Mod: 25

## 2024-07-01 PROCEDURE — 99283 EMERGENCY DEPT VISIT LOW MDM: CPT

## 2024-07-01 PROCEDURE — 96372 THER/PROPH/DIAG INJ SC/IM: CPT

## 2024-07-01 RX ORDER — AMLODIPINE BESYLATE 2.5 MG/1
10 TABLET ORAL ONCE
Refills: 0 | Status: COMPLETED | OUTPATIENT
Start: 2024-07-01 | End: 2024-07-01

## 2024-07-01 RX ORDER — MECLIZINE HCL 25 MG
1 TABLET ORAL
Qty: 15 | Refills: 0
Start: 2024-07-01

## 2024-07-01 RX ORDER — KETOROLAC TROMETHAMINE 30 MG/ML
15 INJECTION, SOLUTION INTRAMUSCULAR ONCE
Refills: 0 | Status: DISCONTINUED | OUTPATIENT
Start: 2024-07-01 | End: 2024-07-01

## 2024-07-01 RX ORDER — NAPROXEN SODIUM 550 MG
1 TABLET ORAL
Qty: 20 | Refills: 0
Start: 2024-07-01

## 2024-07-01 RX ORDER — AMLODIPINE BESYLATE 2.5 MG/1
1 TABLET ORAL
Qty: 30 | Refills: 0
Start: 2024-07-01 | End: 2024-07-30

## 2024-07-01 RX ADMIN — KETOROLAC TROMETHAMINE 15 MILLIGRAM(S): 30 INJECTION, SOLUTION INTRAMUSCULAR at 16:02

## 2024-07-01 RX ADMIN — AMLODIPINE BESYLATE 10 MILLIGRAM(S): 2.5 TABLET ORAL at 16:02

## 2024-07-04 DIAGNOSIS — M25.511 PAIN IN RIGHT SHOULDER: ICD-10-CM

## 2024-07-04 DIAGNOSIS — I10 ESSENTIAL (PRIMARY) HYPERTENSION: ICD-10-CM

## 2024-07-18 ENCOUNTER — APPOINTMENT (OUTPATIENT)
Dept: ORTHOPEDIC SURGERY | Facility: CLINIC | Age: 79
End: 2024-07-18
Payer: MEDICARE

## 2024-07-18 ENCOUNTER — OUTPATIENT (OUTPATIENT)
Dept: OUTPATIENT SERVICES | Facility: HOSPITAL | Age: 79
LOS: 1 days | End: 2024-07-18
Payer: MEDICARE

## 2024-07-18 ENCOUNTER — RESULT REVIEW (OUTPATIENT)
Age: 79
End: 2024-07-18

## 2024-07-18 VITALS
SYSTOLIC BLOOD PRESSURE: 127 MMHG | HEIGHT: 67 IN | BODY MASS INDEX: 19.46 KG/M2 | DIASTOLIC BLOOD PRESSURE: 83 MMHG | WEIGHT: 124 LBS | OXYGEN SATURATION: 99 % | HEART RATE: 81 BPM

## 2024-07-18 DIAGNOSIS — Z96.659 PRESENCE OF UNSPECIFIED ARTIFICIAL KNEE JOINT: Chronic | ICD-10-CM

## 2024-07-18 DIAGNOSIS — M19.019 PRIMARY OSTEOARTHRITIS, UNSPECIFIED SHOULDER: ICD-10-CM

## 2024-07-18 PROCEDURE — 73030 X-RAY EXAM OF SHOULDER: CPT

## 2024-07-18 PROCEDURE — 73030 X-RAY EXAM OF SHOULDER: CPT | Mod: 26,RT

## 2024-07-18 PROCEDURE — 99203 OFFICE O/P NEW LOW 30 MIN: CPT

## 2024-08-28 NOTE — ED ADULT NURSE NOTE - NSICDXFAMILYHX_GEN_ALL_CORE_FT
FAMILY HISTORY:  Mother  Still living? Unknown  FH: Parkinson's disease, Age at diagnosis: Age Unknown    Child  Still living? Unknown  FH: myocardial infarction, Age at diagnosis: Age Unknown     no

## 2024-11-22 NOTE — ED ADULT NURSE NOTE - CAS TRG GEN SKIN CONDITION
After obtaining consent, and per orders of Dr. Jones, injection of Prevnar 20 given in Right deltoid by Deepti Saldana LPN.   Warm

## 2025-07-09 NOTE — ED ADULT NURSE NOTE - OBJECTIVE STATEMENT
IMPRESSIONS     1. Leg swelling    2. Cellulitis, unspecified cellulitis site       SDOH/DISPOSITION/PLAN   Social Determinants affecting Treatment Plan: None    DISPOSITION Decision To Discharge 07/09/2025 02:23:49 PM   DISPOSITION CONDITION Stable         Discharge Note: The patient is stable for discharge home. The signs, symptoms, diagnosis, and discharge instructions have been discussed, understanding conveyed, and agreed upon. The patient is to follow up as recommended or return to ER should their symptoms worsen.      PATIENT REFERRED TO:  Cristobal Bullard MD  [address] [place] [address]  [phone]              DISCHARGE MEDICATIONS:     Medication List        START taking these medications      cephALEXin 500 MG capsule  Commonly known as: KEFLEX  Take 1 capsule by mouth 4 times daily for 7 days     HYDROcodone-acetaminophen 5-325 MG per tablet  Commonly known as: NORCO  Take 1 tablet by mouth every 6 hours as needed for Pain for up to 3 days. Intended supply: 3 days. Take lowest dose possible to manage pain Max Daily Amount: 4 tablets            CONTINUE taking these medications      All-In-One Nebulizer System Misc  Dispense nebulizer kit along with nebulizer and compressor, and nebulizer accessories kit     * Blood Glucose Monitoring Suppl Kit  Use to check glucose fasting every day     * blood glucose monitor kit and supplies  Dispense sufficient amount for indicated testing frequency plus additional to accommodate PRN testing needs. Dispense all needed supplies to include: monitor, strips, lancing device, lancets, control solutions, alcohol swabs.     Lancets Ultra Thin 30G Misc  Use to check glucose once a day fasting           * This list has 2 medication(s) that are the same as other medications prescribed for you. Read the directions carefully, and ask your doctor or other care provider to review them with you.                ASK your doctor about these medications  
Patient presents to the ED complaining of right shoulder pain for a few weeks. Patient reports increased right shoulder pain s/p falling down 4 weeks ago. 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 Patient states that he cannot lift his right arm.

## 2025-07-15 NOTE — ED ADULT TRIAGE NOTE - DATE OF LAST VACCINATION
FUV: 8/18/25  Last seen: 6/16/25  Last refilled: 6/16/25  Last dispensed:6/17/25    MME:0  DS:10/31/24  Medication:    lisdexamfetamine (Vyvanse) 40 MG capsule     Last note reviewed:   Vyvanse 40 mg daily     PDMP reviewed by delegate. No unexpected behavior noted.    Forwarded to prescriber for verification and signature.    
16-Apr-2021